# Patient Record
Sex: FEMALE | Race: WHITE | NOT HISPANIC OR LATINO | Employment: OTHER | URBAN - METROPOLITAN AREA
[De-identification: names, ages, dates, MRNs, and addresses within clinical notes are randomized per-mention and may not be internally consistent; named-entity substitution may affect disease eponyms.]

---

## 2018-01-29 ENCOUNTER — CONVERSION ENCOUNTER (OUTPATIENT)
Dept: FAMILY MEDICINE CLINIC | Facility: CLINIC | Age: 58
End: 2018-01-29

## 2018-01-29 ENCOUNTER — OFFICE VISIT CONVERTED (OUTPATIENT)
Dept: FAMILY MEDICINE CLINIC | Facility: CLINIC | Age: 58
End: 2018-01-29
Attending: NURSE PRACTITIONER

## 2018-05-18 ENCOUNTER — OFFICE VISIT CONVERTED (OUTPATIENT)
Dept: FAMILY MEDICINE CLINIC | Facility: CLINIC | Age: 58
End: 2018-05-18
Attending: NURSE PRACTITIONER

## 2018-11-28 ENCOUNTER — OFFICE VISIT CONVERTED (OUTPATIENT)
Dept: FAMILY MEDICINE CLINIC | Facility: CLINIC | Age: 58
End: 2018-11-28
Attending: NURSE PRACTITIONER

## 2018-11-28 ENCOUNTER — CONVERSION ENCOUNTER (OUTPATIENT)
Dept: FAMILY MEDICINE CLINIC | Facility: CLINIC | Age: 58
End: 2018-11-28

## 2019-01-18 ENCOUNTER — HOSPITAL ENCOUNTER (OUTPATIENT)
Dept: OTHER | Facility: HOSPITAL | Age: 59
Discharge: HOME | End: 2019-01-18
Attending: EMERGENCY MEDICINE

## 2019-01-20 LAB — HBV SURFACE AB SER-ACNC: 314.4 MIU/ML

## 2019-02-18 ENCOUNTER — HOSPITAL ENCOUNTER (OUTPATIENT)
Dept: OTHER | Facility: HOSPITAL | Age: 59
Discharge: HOME | End: 2019-02-18

## 2019-02-18 LAB
ANION GAP SERPL CALC-SCNC: 15 MMOL/L (ref 8–19)
BUN SERPL-MCNC: 13 MG/DL (ref 5–25)
BUN/CREAT SERPL: 12 {RATIO} (ref 6–20)
CALCIUM SERPL-MCNC: 9.7 MG/DL (ref 8.7–10.4)
CHLORIDE SERPL-SCNC: 101 MMOL/L (ref 99–111)
CONV CO2: 28 MMOL/L (ref 22–32)
CREAT UR-MCNC: 1.06 MG/DL (ref 0.5–0.9)
GFR SERPLBLD BASED ON 1.73 SQ M-ARVRAT: 58 ML/MIN/{1.73_M2}
GLUCOSE SERPL-MCNC: 91 MG/DL (ref 65–99)
OSMOLALITY SERPL CALC.SUM OF ELEC: 290 MOSM/KG (ref 273–304)
POTASSIUM SERPL-SCNC: 4.4 MMOL/L (ref 3.5–5.3)
SODIUM SERPL-SCNC: 140 MMOL/L (ref 135–147)
T4 FREE SERPL-MCNC: 1.7 NG/DL (ref 0.9–1.8)
TSH SERPL-ACNC: 2.49 M[IU]/L (ref 0.27–4.2)

## 2019-04-15 ENCOUNTER — OFFICE VISIT CONVERTED (OUTPATIENT)
Dept: FAMILY MEDICINE CLINIC | Facility: CLINIC | Age: 59
End: 2019-04-15
Attending: NURSE PRACTITIONER

## 2019-05-08 ENCOUNTER — HOSPITAL ENCOUNTER (OUTPATIENT)
Dept: GENERAL RADIOLOGY | Facility: HOSPITAL | Age: 59
Discharge: HOME | End: 2019-05-08
Attending: INTERNAL MEDICINE

## 2019-05-21 ENCOUNTER — HOSPITAL ENCOUNTER (OUTPATIENT)
Dept: OTHER | Facility: HOSPITAL | Age: 59
Discharge: HOME | End: 2019-05-21

## 2019-05-21 LAB
BASOPHILS # BLD AUTO: 0.12 10*3/UL (ref 0–0.2)
BASOPHILS NFR BLD AUTO: 1.8 % (ref 0–3)
CONV ABS IMM GRAN: 0.02 10*3/UL (ref 0–0.2)
CONV IMMATURE GRAN: 0.3 % (ref 0–1.8)
DEPRECATED RDW RBC AUTO: 45.1 FL (ref 36.4–46.3)
EOSINOPHIL # BLD AUTO: 0.13 10*3/UL (ref 0–0.7)
EOSINOPHIL # BLD AUTO: 1.9 % (ref 0–7)
ERYTHROCYTE [DISTWIDTH] IN BLOOD BY AUTOMATED COUNT: 13 % (ref 11.7–14.4)
HBA1C MFR BLD: 14.8 G/DL (ref 12–16)
HCT VFR BLD AUTO: 47.5 % (ref 37–47)
LYMPHOCYTES # BLD AUTO: 1.76 10*3/UL (ref 1–5)
MCH RBC QN AUTO: 29.5 PG (ref 27–31)
MCHC RBC AUTO-ENTMCNC: 31.2 G/DL (ref 33–37)
MCV RBC AUTO: 94.8 FL (ref 81–99)
MONOCYTES # BLD AUTO: 0.56 10*3/UL (ref 0.2–1.2)
MONOCYTES NFR BLD AUTO: 8.3 % (ref 3–10)
NEUTROPHILS # BLD AUTO: 4.12 10*3/UL (ref 2–8)
NEUTROPHILS NFR BLD AUTO: 61.5 % (ref 30–85)
NRBC CBCN: 0 % (ref 0–0.7)
PLATELET # BLD AUTO: 191 10*3/UL (ref 130–400)
PMV BLD AUTO: 11.1 FL (ref 9.4–12.3)
RBC # BLD AUTO: 5.01 10*6/UL (ref 4.2–5.4)
VARIANT LYMPHS NFR BLD MANUAL: 26.2 % (ref 20–45)
WBC # BLD AUTO: 6.71 10*3/UL (ref 4.8–10.8)

## 2019-05-22 LAB
ALBUMIN SERPL-MCNC: 4 G/DL (ref 3.5–5)
ALBUMIN/GLOB SERPL: 1.5 {RATIO} (ref 1.4–2.6)
ALP SERPL-CCNC: 90 U/L (ref 53–141)
ALT SERPL-CCNC: 8 U/L (ref 10–40)
ANION GAP SERPL CALC-SCNC: 14 MMOL/L (ref 8–19)
AST SERPL-CCNC: 12 U/L (ref 15–50)
BILIRUB SERPL-MCNC: 0.36 MG/DL (ref 0.2–1.3)
BUN SERPL-MCNC: 15 MG/DL (ref 5–25)
BUN/CREAT SERPL: 15 {RATIO} (ref 6–20)
CALCIUM SERPL-MCNC: 9.6 MG/DL (ref 8.7–10.4)
CHLORIDE SERPL-SCNC: 104 MMOL/L (ref 99–111)
CONV CO2: 29 MMOL/L (ref 22–32)
CONV TOTAL PROTEIN: 6.7 G/DL (ref 6.3–8.2)
CREAT UR-MCNC: 1.03 MG/DL (ref 0.5–0.9)
GFR SERPLBLD BASED ON 1.73 SQ M-ARVRAT: >60 ML/MIN/{1.73_M2}
GLOBULIN UR ELPH-MCNC: 2.7 G/DL (ref 2–3.5)
GLUCOSE SERPL-MCNC: 89 MG/DL (ref 65–99)
OSMOLALITY SERPL CALC.SUM OF ELEC: 296 MOSM/KG (ref 273–304)
POTASSIUM SERPL-SCNC: 4.1 MMOL/L (ref 3.5–5.3)
SODIUM SERPL-SCNC: 143 MMOL/L (ref 135–147)
T4 FREE SERPL-MCNC: 1.6 NG/DL (ref 0.9–1.8)
TSH SERPL-ACNC: 2.12 M[IU]/L (ref 0.27–4.2)

## 2019-09-09 ENCOUNTER — CONVERSION ENCOUNTER (OUTPATIENT)
Dept: FAMILY MEDICINE CLINIC | Facility: CLINIC | Age: 59
End: 2019-09-09

## 2019-09-09 ENCOUNTER — OFFICE VISIT CONVERTED (OUTPATIENT)
Dept: FAMILY MEDICINE CLINIC | Facility: CLINIC | Age: 59
End: 2019-09-09
Attending: NURSE PRACTITIONER

## 2019-09-24 ENCOUNTER — HOSPITAL ENCOUNTER (OUTPATIENT)
Dept: FAMILY MEDICINE CLINIC | Facility: CLINIC | Age: 59
Discharge: HOME | End: 2019-09-24
Attending: NURSE PRACTITIONER

## 2019-09-24 ENCOUNTER — OFFICE VISIT CONVERTED (OUTPATIENT)
Dept: FAMILY MEDICINE CLINIC | Facility: CLINIC | Age: 59
End: 2019-09-24
Attending: NURSE PRACTITIONER

## 2019-09-24 LAB
ALBUMIN SERPL-MCNC: 4.4 G/DL (ref 3.5–5)
ALBUMIN/GLOB SERPL: 1.5 {RATIO} (ref 1.4–2.6)
ALP SERPL-CCNC: 85 U/L (ref 53–141)
ALT SERPL-CCNC: 10 U/L (ref 10–40)
ANION GAP SERPL CALC-SCNC: 21 MMOL/L (ref 8–19)
AST SERPL-CCNC: 15 U/L (ref 15–50)
BASOPHILS # BLD AUTO: 0.07 10*3/UL (ref 0–0.2)
BASOPHILS NFR BLD AUTO: 1.1 % (ref 0–3)
BILIRUB SERPL-MCNC: 0.3 MG/DL (ref 0.2–1.3)
BUN SERPL-MCNC: 12 MG/DL (ref 5–25)
BUN/CREAT SERPL: 11 {RATIO} (ref 6–20)
CALCIUM SERPL-MCNC: 10 MG/DL (ref 8.7–10.4)
CHLORIDE SERPL-SCNC: 103 MMOL/L (ref 99–111)
CHOLEST SERPL-MCNC: 155 MG/DL (ref 107–200)
CHOLEST/HDLC SERPL: 2.5 {RATIO} (ref 3–6)
CONV ABS IMM GRAN: 0.03 10*3/UL (ref 0–0.2)
CONV CO2: 25 MMOL/L (ref 22–32)
CONV IMMATURE GRAN: 0.5 % (ref 0–1.8)
CONV TOTAL PROTEIN: 7.4 G/DL (ref 6.3–8.2)
CREAT UR-MCNC: 1.13 MG/DL (ref 0.5–0.9)
DEPRECATED RDW RBC AUTO: 44.8 FL (ref 36.4–46.3)
EOSINOPHIL # BLD AUTO: 0.12 10*3/UL (ref 0–0.7)
EOSINOPHIL # BLD AUTO: 2 % (ref 0–7)
ERYTHROCYTE [DISTWIDTH] IN BLOOD BY AUTOMATED COUNT: 13 % (ref 11.7–14.4)
GFR SERPLBLD BASED ON 1.73 SQ M-ARVRAT: 54 ML/MIN/{1.73_M2}
GLOBULIN UR ELPH-MCNC: 3 G/DL (ref 2–3.5)
GLUCOSE SERPL-MCNC: 78 MG/DL (ref 65–99)
HCT VFR BLD AUTO: 46.8 % (ref 37–47)
HDLC SERPL-MCNC: 62 MG/DL (ref 40–60)
HGB BLD-MCNC: 14.6 G/DL (ref 12–16)
LDLC SERPL CALC-MCNC: 62 MG/DL (ref 70–100)
LYMPHOCYTES # BLD AUTO: 1.57 10*3/UL (ref 1–5)
LYMPHOCYTES NFR BLD AUTO: 25.6 % (ref 20–45)
MCH RBC QN AUTO: 29.4 PG (ref 27–31)
MCHC RBC AUTO-ENTMCNC: 31.2 G/DL (ref 33–37)
MCV RBC AUTO: 94.4 FL (ref 81–99)
MONOCYTES # BLD AUTO: 0.48 10*3/UL (ref 0.2–1.2)
MONOCYTES NFR BLD AUTO: 7.8 % (ref 3–10)
NEUTROPHILS # BLD AUTO: 3.86 10*3/UL (ref 2–8)
NEUTROPHILS NFR BLD AUTO: 63 % (ref 30–85)
NRBC CBCN: 0 % (ref 0–0.7)
OSMOLALITY SERPL CALC.SUM OF ELEC: 299 MOSM/KG (ref 273–304)
PLATELET # BLD AUTO: 192 10*3/UL (ref 130–400)
PMV BLD AUTO: 11.7 FL (ref 9.4–12.3)
POTASSIUM SERPL-SCNC: 4.2 MMOL/L (ref 3.5–5.3)
RBC # BLD AUTO: 4.96 10*6/UL (ref 4.2–5.4)
SODIUM SERPL-SCNC: 145 MMOL/L (ref 135–147)
TRIGL SERPL-MCNC: 153 MG/DL (ref 40–150)
TSH SERPL-ACNC: 1.68 M[IU]/L (ref 0.27–4.2)
VLDLC SERPL-MCNC: 31 MG/DL (ref 5–37)
WBC # BLD AUTO: 6.13 10*3/UL (ref 4.8–10.8)

## 2019-09-26 LAB
AMOXICILLIN+CLAV SUSC ISLT: <=2
AMPICILLIN SUSC ISLT: >=32
AMPICILLIN+SULBAC SUSC ISLT: 8
BACTERIA UR CULT: ABNORMAL
CEFAZOLIN SUSC ISLT: <=4
CEFEPIME SUSC ISLT: <=1
CEFTAZIDIME SUSC ISLT: <=1
CEFTRIAXONE SUSC ISLT: <=1
CEFUROXIME ORAL SUSC ISLT: <=1
CEFUROXIME PARENTER SUSC ISLT: <=1
CIPROFLOXACIN SUSC ISLT: <=0.25
CONV LAST MENSTURAL PERIOD: NORMAL
ERTAPENEM SUSC ISLT: <=0.5
GENTAMICIN SUSC ISLT: <=1
LEVOFLOXACIN SUSC ISLT: <=0.12
NITROFURANTOIN SUSC ISLT: 64
SPECIMEN SOURCE: NORMAL
SPECIMEN SOURCE: NORMAL
TETRACYCLINE SUSC ISLT: <=1
THIN PREP CVX: NORMAL
TMP SMX SUSC ISLT: <=20
TOBRAMYCIN SUSC ISLT: <=1

## 2019-10-28 ENCOUNTER — HOSPITAL ENCOUNTER (OUTPATIENT)
Dept: OTHER | Facility: HOSPITAL | Age: 59
Discharge: HOME | End: 2019-10-28

## 2019-10-28 LAB
T4 FREE SERPL-MCNC: 1.4 NG/DL (ref 0.9–1.8)
TSH SERPL-ACNC: 3.28 M[IU]/L (ref 0.27–4.2)

## 2019-10-29 LAB — CONV ANTI MICROSOMAL AB: 186 IU/ML (ref 0–34)

## 2020-04-07 ENCOUNTER — TELEPHONE CONVERTED (OUTPATIENT)
Dept: FAMILY MEDICINE CLINIC | Facility: CLINIC | Age: 60
End: 2020-04-07
Attending: NURSE PRACTITIONER

## 2020-06-05 ENCOUNTER — HOSPITAL ENCOUNTER (OUTPATIENT)
Dept: GENERAL RADIOLOGY | Facility: HOSPITAL | Age: 60
Discharge: HOME | End: 2020-06-05
Attending: NURSE PRACTITIONER

## 2020-06-19 ENCOUNTER — HOSPITAL ENCOUNTER (OUTPATIENT)
Dept: GENERAL RADIOLOGY | Facility: HOSPITAL | Age: 60
Discharge: HOME | End: 2020-06-19
Attending: NURSE PRACTITIONER

## 2020-06-19 ENCOUNTER — OFFICE VISIT CONVERTED (OUTPATIENT)
Dept: FAMILY MEDICINE CLINIC | Facility: CLINIC | Age: 60
End: 2020-06-19
Attending: NURSE PRACTITIONER

## 2020-06-30 ENCOUNTER — OFFICE VISIT CONVERTED (OUTPATIENT)
Dept: ORTHOPEDIC SURGERY | Facility: CLINIC | Age: 60
End: 2020-06-30
Attending: PHYSICIAN ASSISTANT

## 2020-07-01 ENCOUNTER — HOSPITAL ENCOUNTER (OUTPATIENT)
Dept: CARDIOLOGY | Facility: HOSPITAL | Age: 60
Discharge: HOME | End: 2020-07-01
Attending: NURSE PRACTITIONER

## 2020-07-21 ENCOUNTER — OFFICE VISIT CONVERTED (OUTPATIENT)
Dept: ORTHOPEDIC SURGERY | Facility: CLINIC | Age: 60
End: 2020-07-21
Attending: PHYSICIAN ASSISTANT

## 2020-11-12 ENCOUNTER — TELEPHONE CONVERTED (OUTPATIENT)
Dept: FAMILY MEDICINE CLINIC | Facility: CLINIC | Age: 60
End: 2020-11-12
Attending: NURSE PRACTITIONER

## 2020-11-13 ENCOUNTER — HOSPITAL ENCOUNTER (OUTPATIENT)
Dept: LAB | Facility: HOSPITAL | Age: 60
Discharge: HOME | End: 2020-11-13
Attending: NURSE PRACTITIONER

## 2020-11-13 LAB
ALBUMIN SERPL-MCNC: 4.1 G/DL (ref 3.5–5)
ALBUMIN/GLOB SERPL: 1.3 {RATIO} (ref 1.4–2.6)
ALP SERPL-CCNC: 106 U/L (ref 53–141)
ALT SERPL-CCNC: 8 U/L (ref 10–40)
ANION GAP SERPL CALC-SCNC: 13 MMOL/L (ref 8–19)
AST SERPL-CCNC: 13 U/L (ref 15–50)
BASOPHILS # BLD AUTO: 0.09 10*3/UL (ref 0–0.2)
BASOPHILS NFR BLD AUTO: 1.5 % (ref 0–3)
BILIRUB SERPL-MCNC: 0.4 MG/DL (ref 0.2–1.3)
BUN SERPL-MCNC: 13 MG/DL (ref 5–25)
BUN/CREAT SERPL: 12 {RATIO} (ref 6–20)
CALCIUM SERPL-MCNC: 9.9 MG/DL (ref 8.7–10.4)
CHLORIDE SERPL-SCNC: 104 MMOL/L (ref 99–111)
CHOLEST SERPL-MCNC: 164 MG/DL (ref 107–200)
CHOLEST/HDLC SERPL: 2.7 {RATIO} (ref 3–6)
CONV ABS IMM GRAN: 0.02 10*3/UL (ref 0–0.2)
CONV CO2: 29 MMOL/L (ref 22–32)
CONV IMMATURE GRAN: 0.3 % (ref 0–1.8)
CONV TOTAL PROTEIN: 7.2 G/DL (ref 6.3–8.2)
CREAT UR-MCNC: 1.12 MG/DL (ref 0.5–0.9)
DEPRECATED RDW RBC AUTO: 45.1 FL (ref 36.4–46.3)
EOSINOPHIL # BLD AUTO: 0.18 10*3/UL (ref 0–0.7)
EOSINOPHIL # BLD AUTO: 3.1 % (ref 0–7)
ERYTHROCYTE [DISTWIDTH] IN BLOOD BY AUTOMATED COUNT: 13.2 % (ref 11.7–14.4)
GFR SERPLBLD BASED ON 1.73 SQ M-ARVRAT: 54 ML/MIN/{1.73_M2}
GLOBULIN UR ELPH-MCNC: 3.1 G/DL (ref 2–3.5)
GLUCOSE SERPL-MCNC: 87 MG/DL (ref 65–99)
HCT VFR BLD AUTO: 47.4 % (ref 37–47)
HDLC SERPL-MCNC: 60 MG/DL (ref 40–60)
HGB BLD-MCNC: 14.8 G/DL (ref 12–16)
LDLC SERPL CALC-MCNC: 77 MG/DL (ref 70–100)
LYMPHOCYTES # BLD AUTO: 1.41 10*3/UL (ref 1–5)
LYMPHOCYTES NFR BLD AUTO: 24.2 % (ref 20–45)
MCH RBC QN AUTO: 28.7 PG (ref 27–31)
MCHC RBC AUTO-ENTMCNC: 31.2 G/DL (ref 33–37)
MCV RBC AUTO: 92 FL (ref 81–99)
MONOCYTES # BLD AUTO: 0.31 10*3/UL (ref 0.2–1.2)
MONOCYTES NFR BLD AUTO: 5.3 % (ref 3–10)
NEUTROPHILS # BLD AUTO: 3.82 10*3/UL (ref 2–8)
NEUTROPHILS NFR BLD AUTO: 65.6 % (ref 30–85)
NRBC CBCN: 0 % (ref 0–0.7)
OSMOLALITY SERPL CALC.SUM OF ELEC: 293 MOSM/KG (ref 273–304)
PLATELET # BLD AUTO: 199 10*3/UL (ref 130–400)
PMV BLD AUTO: 11.5 FL (ref 9.4–12.3)
POTASSIUM SERPL-SCNC: 4.1 MMOL/L (ref 3.5–5.3)
RBC # BLD AUTO: 5.15 10*6/UL (ref 4.2–5.4)
SODIUM SERPL-SCNC: 142 MMOL/L (ref 135–147)
TRIGL SERPL-MCNC: 137 MG/DL (ref 40–150)
TSH SERPL-ACNC: 3.3 M[IU]/L (ref 0.27–4.2)
VLDLC SERPL-MCNC: 27 MG/DL (ref 5–37)
WBC # BLD AUTO: 5.83 10*3/UL (ref 4.8–10.8)

## 2021-01-15 ENCOUNTER — TELEMEDICINE CONVERTED (OUTPATIENT)
Dept: FAMILY MEDICINE CLINIC | Facility: CLINIC | Age: 61
End: 2021-01-15
Attending: NURSE PRACTITIONER

## 2021-02-18 ENCOUNTER — TELEPHONE CONVERTED (OUTPATIENT)
Dept: FAMILY MEDICINE CLINIC | Facility: CLINIC | Age: 61
End: 2021-02-18
Attending: NURSE PRACTITIONER

## 2021-02-19 ENCOUNTER — HOSPITAL ENCOUNTER (OUTPATIENT)
Dept: FAMILY MEDICINE CLINIC | Facility: CLINIC | Age: 61
Discharge: HOME | End: 2021-02-19
Attending: NURSE PRACTITIONER

## 2021-02-21 LAB — SARS-COV-2 RNA SPEC QL NAA+PROBE: NOT DETECTED

## 2021-02-23 ENCOUNTER — HOSPITAL ENCOUNTER (OUTPATIENT)
Dept: LAB | Facility: HOSPITAL | Age: 61
Discharge: HOME | End: 2021-02-23
Attending: NURSE PRACTITIONER

## 2021-02-23 ENCOUNTER — TELEMEDICINE CONVERTED (OUTPATIENT)
Dept: FAMILY MEDICINE CLINIC | Facility: CLINIC | Age: 61
End: 2021-02-23
Attending: NURSE PRACTITIONER

## 2021-02-23 LAB
ALBUMIN SERPL-MCNC: 4.1 G/DL (ref 3.5–5)
ALBUMIN/GLOB SERPL: 1.5 {RATIO} (ref 1.4–2.6)
ALP SERPL-CCNC: 107 U/L (ref 53–141)
ALT SERPL-CCNC: 8 U/L (ref 10–40)
ANION GAP SERPL CALC-SCNC: 16 MMOL/L (ref 8–19)
AST SERPL-CCNC: 11 U/L (ref 15–50)
BASOPHILS # BLD AUTO: 0.06 10*3/UL (ref 0–0.2)
BASOPHILS NFR BLD AUTO: 1.1 % (ref 0–3)
BILIRUB SERPL-MCNC: 0.41 MG/DL (ref 0.2–1.3)
BUN SERPL-MCNC: 13 MG/DL (ref 5–25)
BUN/CREAT SERPL: 12 {RATIO} (ref 6–20)
CALCIUM SERPL-MCNC: 9.8 MG/DL (ref 8.7–10.4)
CHLORIDE SERPL-SCNC: 103 MMOL/L (ref 99–111)
CHOLEST SERPL-MCNC: 123 MG/DL (ref 107–200)
CHOLEST/HDLC SERPL: 1.9 {RATIO} (ref 3–6)
CONV ABS IMM GRAN: 0.02 10*3/UL (ref 0–0.2)
CONV CO2: 27 MMOL/L (ref 22–32)
CONV IMMATURE GRAN: 0.4 % (ref 0–1.8)
CONV TOTAL PROTEIN: 6.9 G/DL (ref 6.3–8.2)
CREAT UR-MCNC: 1.08 MG/DL (ref 0.5–0.9)
DEPRECATED RDW RBC AUTO: 41.5 FL (ref 36.4–46.3)
EOSINOPHIL # BLD AUTO: 0.14 10*3/UL (ref 0–0.7)
EOSINOPHIL # BLD AUTO: 2.6 % (ref 0–7)
ERYTHROCYTE [DISTWIDTH] IN BLOOD BY AUTOMATED COUNT: 12.6 % (ref 11.7–14.4)
GFR SERPLBLD BASED ON 1.73 SQ M-ARVRAT: 56 ML/MIN/{1.73_M2}
GLOBULIN UR ELPH-MCNC: 2.8 G/DL (ref 2–3.5)
GLUCOSE SERPL-MCNC: 86 MG/DL (ref 65–99)
HCT VFR BLD AUTO: 44.3 % (ref 37–47)
HDLC SERPL-MCNC: 64 MG/DL (ref 40–60)
HGB BLD-MCNC: 14.2 G/DL (ref 12–16)
LDLC SERPL CALC-MCNC: 39 MG/DL (ref 70–100)
LYMPHOCYTES # BLD AUTO: 1.52 10*3/UL (ref 1–5)
LYMPHOCYTES NFR BLD AUTO: 27.7 % (ref 20–45)
MCH RBC QN AUTO: 29.1 PG (ref 27–31)
MCHC RBC AUTO-ENTMCNC: 32.1 G/DL (ref 33–37)
MCV RBC AUTO: 90.8 FL (ref 81–99)
MONOCYTES # BLD AUTO: 0.3 10*3/UL (ref 0.2–1.2)
MONOCYTES NFR BLD AUTO: 5.5 % (ref 3–10)
NEUTROPHILS # BLD AUTO: 3.44 10*3/UL (ref 2–8)
NEUTROPHILS NFR BLD AUTO: 62.7 % (ref 30–85)
NRBC CBCN: 0 % (ref 0–0.7)
OSMOLALITY SERPL CALC.SUM OF ELEC: 293 MOSM/KG (ref 273–304)
PLATELET # BLD AUTO: 198 10*3/UL (ref 130–400)
PMV BLD AUTO: 11 FL (ref 9.4–12.3)
POTASSIUM SERPL-SCNC: 4.4 MMOL/L (ref 3.5–5.3)
RBC # BLD AUTO: 4.88 10*6/UL (ref 4.2–5.4)
SODIUM SERPL-SCNC: 142 MMOL/L (ref 135–147)
TRIGL SERPL-MCNC: 99 MG/DL (ref 40–150)
TSH SERPL-ACNC: 6.1 M[IU]/L (ref 0.27–4.2)
VLDLC SERPL-MCNC: 20 MG/DL (ref 5–37)
WBC # BLD AUTO: 5.48 10*3/UL (ref 4.8–10.8)

## 2021-04-09 ENCOUNTER — HOSPITAL ENCOUNTER (OUTPATIENT)
Dept: LAB | Facility: HOSPITAL | Age: 61
Discharge: HOME | End: 2021-04-09
Attending: NURSE PRACTITIONER

## 2021-04-09 LAB — TSH SERPL-ACNC: 2.45 M[IU]/L (ref 0.27–4.2)

## 2021-04-16 ENCOUNTER — OFFICE VISIT CONVERTED (OUTPATIENT)
Dept: CARDIOLOGY | Facility: CLINIC | Age: 61
End: 2021-04-16
Attending: INTERNAL MEDICINE

## 2021-05-10 NOTE — H&P
History and Physical      Patient Name: Italia Beard   Patient ID: [id number]   Sex: Female   YOB: 1960    Primary Care Provider: Jazmín PERALTA    Visit Date: June 30, 2020    Provider: Nay Ramirez PA-C   Location: [facility]   Location Address: [address] [place] [address]  [id number]   Location Phone: [phone]          Chief Complaint  · LEFT ANKLE PAIN      History Of Present Illness  Italia Beard is a 59 year old /White female who presents today to [facility].      Patient is here for left ankle injury sustained 5/27/20 after a falling twisting the ankle. Patient states pain on the lateral side. Patient states using a walking boot. Patient had x rays read as negative fracture or dislocation.       Past Medical History  Depression; GERD; Hypothyroidism; Migraine; Nicotine dependence         Past Surgical History  Appendectomy; Carpal Tunnel Release; Tubal ligation         Medication List  albuterol sulfate 2.5 mg /3 mL (0.083 %) inhalation solution for nebulization; boot walker; Breo Ellipta 200-25 mcg/dose inhalation blister with device; cetirizine 10 mg oral tablet; Imitrex 100 mg oral tablet; levothyroxine 100 mcg oral tablet; Proventil HFA 90 mcg/actuation inhalation HFA aerosol inhaler         Allergy List  PENICILLINS         Family Medical History  Family history of breast cancer; -Father's Family History Unknown; -Mother's Family History Unknown         Social History  Alcohol (Current some day); Caffeine (Current every day); Second hand smoke exposure (Current some day); Tobacco (Current every day)         Review of Systems  · Constitutional  o Denies  o : fever, chills, weight loss  · Cardiovascular  o Denies  o : chest pain, shortness of breath  · Gastrointestinal  o Denies  o : liver disease, heartburn, nausea, blood in stools  · Genitourinary  o Denies  o : painful urination, blood in  "urine  · Integument  o Denies  o : rash, itching  · Neurologic  o Denies  o : headache, weakness, loss of consciousness  · Musculoskeletal  o Denies  o : painful, swollen joints  · Psychiatric  o Denies  o : drug/alcohol addiction, anxiety, depression      Vitals  Date Time BP Position Site L\R Cuff Size HR RR TEMP (F) WT  HT  BMI kg/m2 BSA m2 O2 Sat        06/30/2020 03:26 PM      73 - R   159lbs 3oz 5'  3\" 28.2 1.79 93 %          Physical Examination  · Constitutional  o Appearance  o : well developed, well-nourished, no obvious deformities present  · Head and Face  o Head  o :   § Inspection  § : normocephalic  o Face  o :   § Inspection  § : no facial lesions  · Eyes  o Conjunctivae  o : conjunctivae normal  o Sclerae  o : sclerae white  · Ears, Nose, Mouth and Throat  o Ears  o :   § External Ears  § : appearance within normal limits  § Hearing  § : intact  o Nose  o :   § External Nose  § : appearance normal  · Neck  o Inspection/Palpation  o : normal appearance  o Range of Motion  o : full range of motion  · Respiratory  o Respiratory Effort  o : breathing unlabored  o Inspection of Chest  o : normal appearance  o Auscultation of Lungs  o : no audible wheezing or rales  · Cardiovascular  o Heart  o : regular rate  · Gastrointestinal  o Abdominal Examination  o : soft and non-tender  · Skin and Subcutaneous Tissue  o General Inspection  o : intact, no rashes  · Psychiatric  o General  o : Alert and oriented x3  o Judgement and Insight  o : judgment and insight intact  o Mood and Affect  o : mood normal, affect appropriate  · Left Ankle-Street  o Inspection  o : swelling present, no atrophy, limping gait, ecchymosis , able to bear weight   o Palpation  o : CFL tender to palpation, No tenderness at base of the 5th Metatarsal , No tenderness of navicular, No tenderness at cuboid, No tenderness at tibiofibular synesmosis  o Range of Motion  o : full dorsiflexion, full plantarflexion, full inversion, full " eversion  o Strength  o : full dorsiflexion, full plantarflexion, full inversion, full eversion  o Neurovascular  o : normal sensation, dorsal pedal pulse 2+, posterior tibialis pulse 2+          Assessment  · Left ankle pain, unspecified chronicity     719.47/M25.572  · Ankle sprain     845.00/S93.409A  · Fibula fracture     823.81/S82.409A      Plan  · Orders  o Tobacco cessation counseling completed (4575F) - - 06/30/2020  · Medications  o Medications have been Reconciled  o Transition of Care or Provider Policy  · Instructions  o Reviewed the patient's Past Medical, Social, and Family history as well as the ROS at today's visit, no changes.  o Call or return if worsening symptoms.  o Exercise handout given.  o Follow Up in 3 weeks.  o Electronically Identified Patient Education Materials Provided Electronically     Prescribed physical therapy  may progress out of walking boot as pain permits             Electronically Signed by: Nay Ramirez PA-C -Author on June 30, 2020 04:40:56 PM

## 2021-05-12 NOTE — PROGRESS NOTES
Quick Note      Patient Name: Italia Beard   Patient ID: [id number]   Sex: Female   YOB: 1960    Primary Care Provider: Jazmín PERALTA    Visit Date: April 7, 2020    Provider: FABIAN Garcia   Location: [facility]   Location Address: [address] [place] [address] [place]  [id number]   Location Phone: [phone]          History Of Present Illness  TELEHEALTH VISIT  Chief Complaint: Cough, chest pain from coughing   Italia Beard is a 59 year old /White female who is presenting for evaluation via telehealth visit. Verbal consent obtained before beginning visit. Patient is alone on this call.   Provider spent 10 minutes with the patient during telehealth visit.   The following staff were present during this visit: FORREST Vargas   Past Medical History/Overview of Patient Symptoms  Italia Beard is a 59 year old /White female who presents for evaluation and treatment of:      phone call started at 1:09   ended at 1:19    call done due to covid-19.  she has been coughing, wheezing, sneezing, itchy eyes, allergy symptoms for a week. Now mucous is green. She has been afebrile. She is staying at home for the most part, hasn't taken anything otc except for her albuterol and nebs, but she is out of her nebs.           Assessment  · Allergic rhinitis due to allergen     477.9/J30.9  · Cough     786.2/R05  · Nicotine dependence     305.1/F17.200  · RAD (reactive airway disease)     493.90/J45.909    Problems Reconciled  Plan  · Orders  o ACO-39: Current medications updated and reviewed () - - 04/07/2020  o ACO-14: Influenza immunization was not administered for reasons documented () - - 04/07/2020  · Medications  o Medrol (Jesus) 4 mg oral tablets,dose pack   SIG: take by oral route as directed per package instructions   DISP: (1) 21 ct dose-pack with 0 refills  Prescribed on 04/07/2020     o azithromycin 250 mg oral tablet    SIG: take 2 tablets (500 mg) by oral route once daily for 1 day then 1 tablet (250 mg) by oral route once daily for 4 days   DISP: (6) tablets with 0 refills  Prescribed on 04/07/2020     o albuterol sulfate 2.5 mg /3 mL (0.083 %) inhalation solution for nebulization   SIG: inhale 3 milliliters (2.5 mg) by nebulization route 4 times per day as needed   DISP: (60) Vials with 0 refills  Prescribed on 04/07/2020     o cetirizine 10 mg oral tablet   SIG: take 1 tablet (10 mg) by oral route once daily for 30 days   DISP: (30) tablets with 2 refills  Prescribed on 04/07/2020     o Imitrex 100 mg oral tablet   SIG: take 1 tablet by oral route once   DISP: (10) tablet with 2 refills  Prescribed on 04/07/2020     o Proventil HFA 90 mcg/actuation inhalation HFA aerosol inhaler   SIG: inhale 2 puffs (180 mcg) by inhalation route every 4 hours as needed   DISP: (1) 6.7 gm canister with 1 refills  Refilled on 04/07/2020     o Bactrim -160 mg oral tablet   SIG: take 1 tablet by oral route every 12 hours for 10 days   DISP: (20) tablets with 0 refills  Discontinued on 04/07/2020     o Medications have been Reconciled  o Transition of Care or Provider Policy  · Instructions  o *Form of nicotine being used: cigarettes  o Patient was strongly encouraged to discontinue use of any nicotine containing product or minimize the use of the product.  o Plan Of Care: abx and steroids, allergy meds, call me friday if not improving  o Take all medications as prescribed/directed.  o Rest. Increase Fluids.  o Call the office with any concerns or questions.  · Disposition  o Call or Return if symptoms worsen or persist.            Electronically Signed by: FABIAN Garcia -Author on April 7, 2020 01:46:54 PM

## 2021-05-13 NOTE — PROGRESS NOTES
Progress Note      Patient Name: Italia Beard   Patient ID: [id number]   Sex: Female   YOB: 1960    Primary Care Provider: Jazmín PERALTA    Visit Date: November 12, 2020    Provider: FABIAN Garcia   Location: [facility]   Location Address: [address]  [place]  [id number]   Location Phone: [phone]          Chief Complaint     medication refill       History Of Present Illness  Italia Beard is a 60 year old /White female who presents for evaluation and treatment of:   TELEHEALTH TELEPHONE VISIT  Italia Beard is a 60 year old /White female who is presenting for evaluation via telehealth telephone visit. Verbal consent obtained before beginning visit.   Provider spent 11 minutes with patient during telehealth visit.   Past Medical History/Overview of Patient Symptoms     hypothyroidism, had been managed by endocrinology who has retired, so she would like me to now manage. She is doing well, energy level is good    chronic bronchitis in a smoker, she needs her inhalers refilled.     she will go for lab work       Past Medical History  Disease Name Date Onset Notes   Chronic Obstructive Pulmonary Disease --  --    Depression --  --    GERD --  --    Hypothyroidism --  --    Migraine --  --    Nicotine dependence --  --    Seasonal allergies --  --    Thyroid disorder --  --          Past Surgical History  Procedure Name Date Notes   Appendectomy --  --    Carpal Tunnel Release --  right hand   Colonoscopy --  --    Gallbladder --  --    Tubal ligation --  --          Medication List  Name Date Started Instructions   albuterol sulfate 2.5 mg /3 mL (0.083 %) inhalation solution for nebulization 04/07/2020 inhale 3 milliliters (2.5 mg) by nebulization route 4 times per day as needed   boot walker 06/22/2020 wear daily   Breo Ellipta 200-25 mcg/dose inhalation blister with device 01/29/2018  inhale 1 puff by inhalation route once daily at the same time each day   Imitrex 100 mg oral tablet 04/07/2020 take 1 tablet by oral route once   levothyroxine 100 mcg oral tablet 09/24/2019 take 1 tablet (100 mcg) by oral route once daily for 30 days   Proventil HFA 90 mcg/actuation inhalation HFA aerosol inhaler 09/11/2020 inhale 2 puffs (180 mcg) by inhalation route every 4 hours as needed         Allergy List  Allergen Name Date Reaction Notes   PENICILLINS --  --  --        Allergies Reconciled  Family Medical History  Disease Name Relative/Age Notes   Stroke Father/   Father   Heart Disease Father/  Mother/   Mother; Father   Diabetes, unspecified type Sister/   Sister   Family history of breast cancer  Aunt   -Father's Family History Unknown Father/   Father   -Mother's Family History Unknown Mother/   Mother         Social History  Finding Status Start/Stop Quantity Notes   Alcohol Current some day 0/0 --  drinks rarely   Caffeine Current every day 0/0 --  drinks regularly; 3-4 times per day   lives with children --  --/-- --  --    . --  --/-- --  --    Recreational Drug Use Never --/-- --  no   Second hand smoke exposure Current some day 0/0 --  yes   Tobacco Current every day --/-- 1.5 PPD current every day smoker, 1.5 packs per day, smoked 21-30 years  current everyday smoker; started smoking at age 17; smoked 30 cigarette(s) per day   Working --  --/-- --  --          Immunizations  NameDate Admin Mfg Trade Name Lot Number Route Inj VIS Given VIS Publication   Hepatitis A05/18/2018 SKB HAVRIX-ADULT [id number] IM RD 05/18/2018 07/20/2016   Comments: pt tolerated well ,left office in stable condition         Review of Systems  · Constitutional  o Denies  o : fever, fatigue, weight loss, weight gain  · Cardiovascular  o Denies  o : lower extremity edema, claudication, chest pressure, palpitations  · Respiratory  o Denies  o : shortness of breath, wheezing, cough, hemoptysis, dyspnea on  exertion  · Gastrointestinal  o Denies  o : nausea, vomiting, diarrhea, constipation, abdominal pain  · Psychiatric  o Denies  o : anxiety, depression          Assessment  · Hypothyroidism     244.9/E03.9  · Screening for depression     V79.0/Z13.89  · Medication monitoring encounter     V58.83/Z51.81    Problems Reconciled  Plan  · Orders  o Annual depression screening, 15 minutes (69010, ) - V79.0/Z13.89 - 11/12/2020  o ACO-18: Negative screen for clinical depression using a standardized tool () - V79.0/Z13.89 - 11/12/2020  o ACO-39: Current medications updated and reviewed (, 1159F) - - 11/12/2020  o ACO-20: Screening Mammography documented and reviewed [facility] () - - 11/12/2020  o Physician Telephone Evaluation, 11-20 minutes (42777) - - 11/12/2020  · Medications  o Breo Ellipta 200-25 mcg/dose inhalation blister with device   SIG: inhale 1 puff by inhalation route once daily at the same time each day   DISP: (1) Box with 5 refills  Refilled on 11/12/2020     o Proventil HFA 90 mcg/actuation inhalation HFA aerosol inhaler   SIG: inhale 2 puffs (180 mcg) by inhalation route every 4 hours as needed   DISP: (1) Carton with 1 refills  Refilled on 11/12/2020     o Medications have been Reconciled  o Transition of Care or Provider Policy  · Instructions  o Depression Screen completed and scanned into the EMR under the designated folder within the patient's documents.  o Today's PHQ-9 result is __4_  o The provider screening met the required time of 15 minutes.  o Take all medications as prescribed/directed.  o Patient was educated/instructed on their diagnosis, treatment and medications prior to discharge from the clinic today.  o Patient instructed to seek medical attention urgently for new or worsening symptoms.  o Call the office with any concerns or questions.  o Chronic conditions reviewed and taken into consideration for today's treatment plan.  o Plan Of Care:   o she will do labs tomorrow and  we will wait to refill meds to make sure tsh is normal, she has a week left of the med. Will call [facility] reslts. Refill the inhalers. Work on smoking cessation  · Disposition  o Call or Return if symptoms worsen or persist.  o F/u appt in 6 months            Electronically Signed by: FABIAN Gracia -Author on November 12, 2020 02:08:34 PM

## 2021-05-13 NOTE — PROGRESS NOTES
Progress Note      Patient Name: Italia Beard   Patient ID: [id number]   Sex: Female   YOB: 1960    Primary Care Provider: Jazmín PERALTA    Visit Date: June 19, 2020    Provider: FABIAN Garcia   Location: [facility]   Location Address: [address] [place] [address] [place]  [id number]   Location Phone: [phone]          Chief Complaint     Left ankle swelling, painful       History Of Present Illness  Italia Beard is a 59 year old /White female who presents for evaluation and treatment of:      about a month ago, she was sitting on her bed, got up, felt like her foot was asleep as she started walking she says she felt something snap and she fell and hit the floor. The pain was so bad it made her nauseated. She put it up, iced it, felt like it was broken but didnt' seek treatment. She called the office the next week and we ordered xrays which were normal. It has been painful and swelling ever since. She tries to keep it elevated but she has a 3 year old and she stays busy.       Past Medical History  Disease Name Date Onset Notes   Depression --  --    GERD --  --    Hypothyroidism --  --    Migraine --  --    Nicotine dependence --  --          Past Surgical History  Procedure Name Date Notes   Appendectomy --  --    Carpal Tunnel Release --  right hand   Tubal ligation --  --          Medication List  Name Date Started Instructions   albuterol sulfate 2.5 mg /3 mL (0.083 %) inhalation solution for nebulization 04/07/2020 inhale 3 milliliters (2.5 mg) by nebulization route 4 times per day as needed   Breo Ellipta 200-25 mcg/dose inhalation blister with device 01/29/2018 inhale 1 puff by inhalation route once daily at the same time each day   cetirizine 10 mg oral tablet 04/07/2020 take 1 tablet (10 mg) by oral route once daily for 30 days   Imitrex 100 mg oral tablet 04/07/2020 take 1 tablet by oral route once   levothyroxine 100 mcg  "oral tablet 09/24/2019 take 1 tablet (100 mcg) by oral route once daily for 30 days   Proventil HFA 90 mcg/actuation inhalation HFA aerosol inhaler 04/07/2020 inhale 2 puffs (180 mcg) by inhalation route every 4 hours as needed         Allergy List  Allergen Name Date Reaction Notes   PENICILLINS --  --  --        Allergies Reconciled  Family Medical History  Disease Name Relative/Age Notes   Family history of breast cancer  Aunt   -Father's Family History Unknown Father/   Father   -Mother's Family History Unknown Mother/   Mother         Social History  Finding Status Start/Stop Quantity Notes   Alcohol Current some day 0/0 --  drinks rarely   Caffeine Current every day 0/0 --  drinks regularly; 3-4 times per day   Second hand smoke exposure Current some day 0/0 --  yes   Tobacco Current every day 16/0 --  current everyday smoker; started smoking at age 17; smoked 30 cigarette(s) per day         Immunizations  NameDate Admin Mfg Trade Name Lot Number Route Inj VIS Given VIS Publication   Hepatitis A05/18/2018 SKB HAVRIX-ADULT [id number] IM RD 05/18/2018 07/20/2016   Comments: pt tolerated well ,left office in stable condition         Review of Systems  · Cardiovascular  o Admits  o : lower extremity edema  o Denies  o : chest pain, irregular heart beats  · Musculoskeletal  o Admits  o : joint swelling, muscle pain, ankle pain  o Denies  o : limitation of motion      Vitals  Date Time BP Position Site L\R Cuff Size HR RR TEMP (F) WT  HT  BMI kg/m2 BSA m2 O2 Sat HC       09/09/2019 01:09 /69 Sitting    84 - R 16 98.6 144lbs 3oz 5'  3\" 25.54 1.71 98 %    09/24/2019 07:49 /70 Sitting    77 - R 16 97.6 145lbs 4oz 5'  3\" 25.73 1.71 98 %    06/19/2020 02:26 /79 Sitting    96 - R 20 98.4 159lbs 1oz 5'  3\" 28.18 1.79 98 %          Physical Examination  · Constitutional  o Appearance  o : well developed, well-nourished, no acute distress  · Neck  o Inspection/Palpation  o : normal appearance, no masses or " tenderness, trachea midline  o Thyroid  o : gland size normal, nontender, no nodules or masses present on palpation  · Respiratory  o Respiratory Effort  o : breathing unlabored  o Inspection of Chest  o : chest rise symmetric bilaterally  o Auscultation of Lungs  o : clear to auscultation bilaterally throughout inspiration and expiration  · Cardiovascular  o Heart  o :   § Auscultation of Heart  § : regular rate and rhythm, no murmurs, gallops or rubs  o Peripheral Vascular System  o :   § Extremities  § : no edema  · Lymphatic  o Neck  o : no cervical lymphadenopathy, no supraclavicular lymphadenopathy  · Psychiatric  o Mood and Affect  o : mood normal, affect appropriate     the left lower leg to ankle is swollen and tender to touch. no redness. she is most tender at the left lateral proximal malleolus.  No bruising evident.           Assessment  · Nicotine dependence     305.1/F17.200  · Fall     E888.9/W19.XXXA  · Ankle pain     719.47/M25.579  · Ankle swelling     719.07/M25.473      Plan  · Orders  o ACO-39: Current medications updated and reviewed () - - 06/19/2020  o ACO-14: Influenza immunization was not administered for reasons documented () - - 06/19/2020  o Xray ankle left [facility] ([id number]) - E888.9/W19.XXXA, 719.47/M25.579 - 06/22/2020  o Xray tib/fib left [facility] ([id number]) - E888.9/W19.XXXA, 719.47/M25.579 - 06/22/2020  · Medications  o boot walker   SIG: wear daily   DISP: (1) appliance with 0 refills  Prescribed on 06/22/2020     o Medications have been Reconciled  o Transition of Care or Provider Policy  · Instructions  o *Form of nicotine being used: cigarettes  o Patient was strongly encouraged to discontinue use of any nicotine containing product or minimize the use of the product.  o Plan Of Care: repeat the xray, put in a boot walker. If negative, DVT u/s and if neg. refer to ortho.   o Take all medications as prescribed/directed.  o Call the office with any  concerns or questions.  · Disposition  o Call or Return if symptoms worsen or persist.            Electronically Signed by: FABIAN Garcia -Author on June 22, 2020 12:56:30 PM

## 2021-05-13 NOTE — PROGRESS NOTES
Progress Note      Patient Name: Italia Beard   Patient ID: [id number]   Sex: Female   YOB: 1960    Primary Care Provider: Jazmín PERALTA   Referring Provider: Ghassan Weir MD    Visit Date: July 21, 2020    Provider: Nay Ramirez PA-C   Location: [facility]   Location Address: [address] [place] [address]  [id number]   Location Phone: [phone]          Chief Complaint  · Left ankle pain      History Of Present Illness  Italia Beard is a 59 year old /White female who presents today to [facility].      Patient is following up for left ankle injury, left distal fibula fracture sustained 5/27/20 after a falling twisting the ankle. Patient states mild pain in the left ankle. Patient is wearing a tennis shoe.             Past Medical History  Chronic Obstructive Pulmonary Disease; Depression; GERD; Hypothyroidism; Migraine; Nicotine dependence; Seasonal allergies; Thyroid disorder         Past Surgical History  Appendectomy; Carpal Tunnel Release; Colonoscopy; Gallbladder; Tubal ligation         Medication List  albuterol sulfate 2.5 mg /3 mL (0.083 %) inhalation solution for nebulization; boot walker; Breo Ellipta 200-25 mcg/dose inhalation blister with device; Imitrex 100 mg oral tablet; levothyroxine 100 mcg oral tablet; Proventil HFA 90 mcg/actuation inhalation HFA aerosol inhaler         Allergy List  PENICILLINS         Family Medical History  Stroke; Heart Disease; Diabetes, unspecified type; Family history of breast cancer; -Father's Family History Unknown; -Mother's Family History Unknown         Social History  Alcohol (Current some day); Alcohol Use (Never); Caffeine (Current every day); lives with children; lives with spouse; .; Recreational Drug Use (Never); Second hand smoke exposure (Current some day); Tobacco (Current every day); Working         Review of Systems  · Constitutional  o Denies  o : fever, chills,  "weight loss  · Cardiovascular  o Denies  o : chest pain, shortness of breath  · Gastrointestinal  o Denies  o : liver disease, heartburn, nausea, blood in stools  · Genitourinary  o Denies  o : painful urination, blood in urine  · Integument  o Denies  o : rash, itching  · Neurologic  o Denies  o : headache, weakness, loss of consciousness  · Musculoskeletal  o Denies  o : painful, swollen joints  · Psychiatric  o Denies  o : drug/alcohol addiction, anxiety, depression      Vitals  Date Time BP Position Site L\R Cuff Size HR RR TEMP (F) WT  HT  BMI kg/m2 BSA m2 O2 Sat        07/21/2020 10:35 AM      72 - R   155lbs 16oz 5'  3\" 27.63 1.77 96 %          Physical Examination  · Constitutional  o Appearance  o : well developed, well-nourished, no obvious deformities present  · Head and Face  o Head  o :   § Inspection  § : normocephalic  o Face  o :   § Inspection  § : no facial lesions  · Eyes  o Conjunctivae  o : conjunctivae normal  o Sclerae  o : sclerae white  · Ears, Nose, Mouth and Throat  o Ears  o :   § External Ears  § : appearance within normal limits  § Hearing  § : intact  o Nose  o :   § External Nose  § : appearance normal  · Neck  o Inspection/Palpation  o : normal appearance  o Range of Motion  o : full range of motion  · Respiratory  o Respiratory Effort  o : breathing unlabored  o Inspection of Chest  o : normal appearance  o Auscultation of Lungs  o : no audible wheezing or rales  · Cardiovascular  o Heart  o : regular rate  · Gastrointestinal  o Abdominal Examination  o : soft and non-tender  · Skin and Subcutaneous Tissue  o General Inspection  o : intact, no rashes  · Psychiatric  o General  o : Alert and oriented x3  o Judgement and Insight  o : judgment and insight intact  o Mood and Affect  o : mood normal, affect appropriate  · Left Ankle-Street  o Inspection  o : swelling present, no atrophy, no limping gait, ecchymosis , able to bear weight   o Palpation  o : ligaments non tender to " palpation, No tenderness at base of the 5th Metatarsal , No tenderness of navicular, No tenderness at cuboid, No tenderness at tibiofibular synesmosis  o Range of Motion  o : full dorsiflexion, full plantarflexion, full inversion, full eversion  o Strength  o : full dorsiflexion, full plantarflexion, full inversion, full eversion  o Neurovascular  o : normal sensation, dorsal pedal pulse 2+, posterior tibialis pulse 2+  o Special Tests  o : normal heel raise, normal resisted eversion, normal resisted inversion, negative anterior drawer, negative squeeze test, negative talar tilt, negative proprioception, Negative Tinel's Test          Assessment  · Left ankle pain, unspecified chronicity     719.47/M25.572  · Fibula fracture     823.81/S82.409A  · Ankle sprain     845.00/S93.409A      Plan  · Medications  o Medications have been Reconciled  o Transition of Care or Provider Policy  · Instructions  o Reviewed the patient's Past Medical, Social, and Family history as well as the ROS at today's visit, no changes.  o Call or return if worsening symptoms.  o Exercise handout given.  o Follow Up PRN.  o Electronically Identified Patient Education Materials Provided Electronically            Electronically Signed by: AICHA Robles-HÉCTOR -Author on July 21, 2020 11:03:20 AM  Electronically Co-signed by: Ghassan Weir MD -Reviewer on July 23, 2020 04:08:37 PM

## 2021-05-14 VITALS
SYSTOLIC BLOOD PRESSURE: 118 MMHG | DIASTOLIC BLOOD PRESSURE: 60 MMHG | WEIGHT: 148 LBS | HEART RATE: 82 BPM | HEIGHT: 63 IN | BODY MASS INDEX: 26.22 KG/M2

## 2021-05-14 NOTE — PROGRESS NOTES
Progress Note      Patient Name: Italia Beard   Patient ID: [id number]   Sex: Female   YOB: 1960    Primary Care Provider: Jazmín PERALTA    Visit Date: January 15, 2021    Provider: FABIAN Garcia   Location: [facility]   Location Address: [address]  [place], [place]  [id number]   Location Phone: [phone]          Chief Complaint  · Follow up office visit within 14 calendar days of discharge from inpatient status (moderate complexity).      History Of Present Illness  FOLLOW UP OFFICE VISIT WITHIN 14 CALENDAR DAYS OF INPATIENT STATUS (MODERATE COMPLEXITY)  Italia Beadr presents to office for follow up post discharge from inpatient status within 14 calendar days. Patient was contacted within 2 business days via phone conversation. Documentation of that phone contact is present in the patient's electronic chart. Patient was admitted to inpatient facility on 01/05/2021 and discharged 01/07/2021 due to: chest pain, pacemaker placement.   Admitting MD: YASSINE Crowley   Her discharge summary has been reviewed and placed in the patient's electronic chart.   Patient's problem list is: COPD (chronic obstructive pulmonary disease), Depression, GERD, Hyperlipidemia, Hypothyroidism, Migraine, and Nicotine dependence   Patient's outpatient medication list has been reconcilled with the medication list from the discharge summary and has been reviewed with the patient. Current medication list is: Amberen 2 tablets oral, Aspir-81 81 mg oral tablet,delayed release (DR/EC), atorvastatin 40 mg oral tablet, Breo Ellipta 200-25 mcg/dose inhalation blister with device, cyanocobalamin (vitamin B-12) 1,000 mcg oral tablet, ferrous sulfate 325 mg (65 mg iron) oral tablet, Imitrex 100 mg oral tablet, levothyroxine 100 mcg oral tablet, nicotine 21 mg/24 hr transdermal patch 24 hour, and Proventil HFA 90 mcg/actuation inhalation HFA aerosol inhaler  "  Video Conferencing Visit  Italia Beard is a 60 year old /White female who is presenting for evaluation via video conferencing via [employer]. Verbal consent obtained before beginning visit.   The following staff were present during this visit:    Italia Beard is a 60 year old /White female who presents for evaluation and treatment of:      follow up after pacemaker placement for AV block. SHe went to the ER with chest pain, \" I just knew I was having a heart attack.\" She had a dual chamber placed and she is feeling good this morning except the site is sore. She has an apt with cardio today.    nicotine dep- she is down to 4 cig a day. Feels better, less coughing. SHe does have COPD.     HLP- newly on Lipitor    taking a baby asa       Past Medical History  Disease Name Date Onset Notes   Chronic Obstructive Pulmonary Disease --  --    Depression --  --    GERD --  --    Hypothyroidism --  --    Migraine --  --    Nicotine dependence --  --    Seasonal allergies --  --    Thyroid disorder --  --          Past Surgical History  Procedure Name Date Notes   Appendectomy --  --    Carpal Tunnel Release --  right hand   Colonoscopy --  --    Gallbladder --  --    Pacemaker --  --    Tubal ligation --  --          Medication List  Name Date Started Instructions   albuterol sulfate 2.5 mg /3 mL (0.083 %) inhalation solution for nebulization 04/07/2020 inhale 3 milliliters (2.5 mg) by nebulization route 4 times per day as needed   Amberen 2 tablets oral 01/07/2021 takes 2 tablets OTC every morning for menopause   Aspir-81 81 mg oral tablet,delayed release (DR/EC) 01/07/2021 take 1 tablet (81 mg) by oral route once daily for 30 days   atorvastatin 40 mg oral tablet 01/07/2021 take 1 tablet (40 mg) by oral route once daily at bedtime for 30 days   boot walker 06/22/2020 wear daily   Breo Ellipta 200-25 mcg/dose inhalation blister with device 11/12/2020 inhale 1 puff by inhalation route " once daily at the same time each day   cyanocobalamin (vitamin B-12) 1,000 mcg oral tablet 01/07/2021 take 1 tablet by oral route daily   ferrous sulfate 325 mg (65 mg iron) oral tablet 01/07/2021 take 1 tablet (325 mg) by oral route once daily   Imitrex 100 mg oral tablet 04/07/2020 take 1 tablet by oral route once   levothyroxine 100 mcg oral tablet 11/17/2020 take 1 tablet (100 mcg) by oral route once daily for 30 days   nicotine 21 mg/24 hr transdermal patch 24 hour 01/07/2021 apply 1 patch (21 mg) by transdermal route once daily and remove at bedtime for 7 days   Proventil HFA 90 mcg/actuation inhalation HFA aerosol inhaler 11/12/2020 inhale 2 puffs (180 mcg) by inhalation route every 4 hours as needed         Allergy List  Allergen Name Date Reaction Notes   PENICILLINS --  --  --        Allergies Reconciled  Family Medical History  Disease Name Relative/Age Notes   Stroke Father/   Father   Heart Disease Father/  Mother/   Mother; Father   Diabetes, unspecified type Sister/   Sister   Family history of breast cancer  Aunt   -Father's Family History Unknown Father/   Father   -Mother's Family History Unknown Mother/   Mother         Social History  Finding Status Start/Stop Quantity Notes   Alcohol Current some day 0/0 --  drinks rarely   Caffeine Current every day 0/0 --  drinks regularly; 3-4 times per day   lives with children --  --/-- --  --    . --  --/-- --  --    Recreational Drug Use Never --/-- --  no   Second hand smoke exposure Current some day 0/0 --  yes   Tobacco Current every day --/-- 4 cigs current every day smoker, 1.5 packs per day, smoked 21-30 years  current everyday smoker; started smoking at age 17; smoked 30 cigarette(s) per day   Working --  --/-- --  --          Immunizations  NameDate Admin Mfg Trade Name Lot Number Route Inj VIS Given VIS Publication   Hepatitis A05/18/2018 SKB HAVRIX-ADULT [id number] IM RD 05/18/2018 07/20/2016   Comments: pt tolerated well ,left office in  stable condition         Review of Systems  · Constitutional  o Denies  o : fever, weight gain, weight loss, fatigue  · Cardiovascular  o Denies  o : palpitation, chest Pain, claudication, lower extremity edema  · Respiratory  o Denies  o : shortness of breath, hemoptysis, wheezing, dry cough, productive cough  · Gastrointestinal  o Denies  o : nausea, vomiting, diarrhea, constipation, abdominal pain  · Neurologic  o Denies  o : unsteady gait, weakness, dizziness, H/A  · Psychiatric  o Denies  o : anxiety, depression      Physical Examination     well developed well nourished in no distress    affect is pleasant, well groomed    breathing comfortably without difficulty    no peripheral edema    incision site for pacemaker in the left upper chest wall looks like it's healing well, minimal redness and bruising but tender when she touches it.     neuro intact           Assessment  · COPD (chronic obstructive pulmonary disease)     496/J44.9  · Hyperlipidemia     272.4/E78.5  · Hypothyroidism     244.9/E03.9  · Nicotine dependence     305.1/F17.200  · Tobacco abuse counseling       Tobacco abuse counseling     V65.42/Z71.6  · History of first degree AV block     V12.59/Z86.79  · Pacemaker     V45.01/Z95.0      Plan  · Orders  o Smoking cessation counseling, 3-10 minutes [facility] (92059) - 305.1/F17.200 - 01/15/2021  o ACO-17: Screened for tobacco use AND received tobacco cessation intervention (4004F) - 305.1/F17.200 - 01/15/2021  o ACO-17: Screened for tobacco use AND received tobacco cessation intervention (4004F) - V65.42/Z71.6 - 01/15/2021  o Discharge medications reconciled with the current medication list (1111F) - - 01/15/2021  o Physical, Primary Care Panel (CBC, CMP, Lipid, TSH) [facility] ([id number], [id number], 12100, 01874) - 496/J44.9, 272.4/E78.5, V12.59/Z86.79 - 02/15/2021  o ACO-39: Current medications updated and reviewed (, 1159F) - - 01/15/2021  · Medications  o atorvastatin 40 mg oral tablet   SIG: take 1 tablet (40  mg) by oral route once daily at bedtime for 30 days   DISP: (30) Tablet with 5 refills  Prescribed on 01/15/2021     o nicotine 21 mg/24 hr transdermal patch 24 hour   SIG: apply 1 patch (21 mg) by transdermal route once daily and remove at bedtime for 7 days   DISP: (30) Patch with 0 refills  Prescribed on 01/15/2021     o Medications have been Reconciled  o Transition of Care or Provider Policy  · Instructions  o Advised that cheeses and other sources of dairy fats, animal fats, fast food, and the extras (candy, pastries, pies, doughnuts and cookies) all contain LDL raising nutrients. Advised to increase fruits, vegetables, whole grains, and to monitor portion sizes.   o *Form of nicotine being used:   o Patient was strongly encouraged to discontinue use of any nicotine containing product or minimize the use of the product.  o Tobacco and smoking cessation counseling for more than 3 minutes was completed.  o Patient discharge summary has been reviewed and placed in patient's electronic medical record.  o Patient received a phone call from my office within 2 business days of discharge from inpatient status, and documented within the patient's chart.  o Also patient was seen (face to face) for follow up evaluation within 14 calendar days of discharge from inpatient status for a severe complexity issue.  o Patient was educated on their diagnosis, treatment and any medication changes while being evaluated today.  o Patient is taking medications as prescribed and doing well.   o Patient was educated/instructed on their diagnosis, treatment and medications prior to discharge from the clinic today.  o Patient instructed to seek medical attention urgently for new or worsening symptoms.  o Call the office with any concerns or questions.  · Disposition  o Call or Return if symptoms worsen or persist.  o F/U appt in 1 month            Electronically Signed by: Jazmín Dolan APRN -Author on January 15, 2021 09:38:42  AM

## 2021-05-14 NOTE — PROGRESS NOTES
Progress Note      Patient Name: Italia Beard   Patient ID: [id number]   Sex: Female   YOB: 1960    Primary Care Provider: Jazmín PERALTA    Visit Date: February 23, 2021    Provider: FABIAN Garcia   Location: [facility]   Location Address: [address]  [place], [place]  [id number]   Location Phone: [phone]          Chief Complaint  · follow up URI      History Of Present Illness  Video Conferencing Visit  Italia Beard is a 60 year old /White female who is presenting for evaluation via video conferencing via [email or web address]. Verbal consent obtained before beginning visit.   The following staff were present during this visit:    Italia Beard is a 60 year old /White female who presents for evaluation and treatment of:      productive cough, wheezing, sinus congestion. Covid was negative. She has had symptoms for over a week. She is coughing a lot at night but not all through the night. She has cut back on her smoking to 1/2 ppd or less. She hasn't been taking anything otc. She has not lost taste or smell.       Past Medical History  Disease Name Date Onset Notes   COPD (chronic obstructive pulmonary disease) 01/15/2021 --    Depression --  --    GERD --  --    Hyperlipidemia 01/15/2021 --    Hypothyroidism --  --    Migraine --  --    Nicotine dependence 01/15/2021 --    Seasonal allergies --  --    Thyroid disorder --  --          Past Surgical History  Procedure Name Date Notes   Appendectomy --  --    Carpal Tunnel Release --  right hand   Colonoscopy --  --    Gallbladder --  --    Pacemaker --  --    Tubal ligation --  --          Medication List  Name Date Started Instructions   albuterol sulfate 2.5 mg /3 mL (0.083 %) inhalation solution for nebulization 04/07/2020 inhale 3 milliliters (2.5 mg) by nebulization route 4 times per day as needed   Amberen 2 tablets oral 01/07/2021 takes 2 tablets  OTC every morning for menopause   Aspir-81 81 mg oral tablet,delayed release (DR/EC) 01/07/2021 take 1 tablet (81 mg) by oral route once daily for 30 days   atorvastatin 40 mg oral tablet 01/15/2021 take 1 tablet (40 mg) by oral route once daily at bedtime for 30 days   boot walker 06/22/2020 wear daily   Breo Ellipta 200-25 mcg/dose inhalation blister with device 11/12/2020 inhale 1 puff by inhalation route once daily at the same time each day   cyanocobalamin (vitamin B-12) 1,000 mcg oral tablet 01/07/2021 take 1 tablet by oral route daily   ferrous sulfate 325 mg (65 mg iron) oral tablet 01/07/2021 take 1 tablet (325 mg) by oral route once daily   Imitrex 100 mg oral tablet 04/07/2020 take 1 tablet by oral route once   levothyroxine 100 mcg oral tablet 11/17/2020 take 1 tablet (100 mcg) by oral route once daily for 30 days   nicotine 21 mg/24 hr transdermal patch 24 hour 01/15/2021 apply 1 patch (21 mg) by transdermal route once daily and remove at bedtime for 7 days   Proventil HFA 90 mcg/actuation inhalation HFA aerosol inhaler 11/12/2020 inhale 2 puffs (180 mcg) by inhalation route every 4 hours as needed         Allergy List  Allergen Name Date Reaction Notes   PENICILLINS --  --  --          Family Medical History  Disease Name Relative/Age Notes   Stroke Father/   Father   Heart Disease Father/  Mother/   Mother; Father   Diabetes, unspecified type Sister/   Sister   Family history of breast cancer  Aunt   -Father's Family History Unknown Father/   Father   -Mother's Family History Unknown Mother/   Mother         Social History  Finding Status Start/Stop Quantity Notes   Alcohol Current some day 0/0 --  drinks rarely   Caffeine Current every day 0/0 --  drinks regularly; 3-4 times per day   lives with children --  --/-- --  --    . --  --/-- --  --    Recreational Drug Use Never --/-- --  no   Second hand smoke exposure Current some day 0/0 --  yes   Tobacco Current every day --/-- 4 cigs current  every day smoker, 1.5 packs per day, smoked 21-30 years  current everyday smoker; started smoking at age 17; smoked 30 cigarette(s) per day   Working --  --/-- --  --          Immunizations  NameDate Admin Mfg Trade Name Lot Number Route Inj VIS Given VIS Publication   Hepatitis A05/18/2018 SKB HAVRIX-ADULT [id number] IM RD 05/18/2018 07/20/2016   Comments: pt tolerated well ,left office in stable condition         Review of Systems  · Constitutional  o Denies  o : fever, fatigue, weight loss, weight gain  · HENT  o Admits  o : headaches, nasal congestion  o Denies  o : sinus pain  · Cardiovascular  o Denies  o : lower extremity edema, claudication, chest pressure, palpitations  · Respiratory  o Admits  o : shortness of breath, cough, wheezing  · Gastrointestinal  o Denies  o : nausea, vomiting, diarrhea, constipation, abdominal pain  · Psychiatric  o Denies  o : anxiety, depression      Physical Examination     well developed well nourished in no distress  talking without difficulty, resp unlabored  affect pleasant   gross neuro intact               Assessment  · Nicotine dependence     305.1/F17.200  · URI (upper respiratory infection)     465.9/J06.9  · RAD (reactive airway disease)     493.90/J45.909      Plan  · Orders  o ACO-17: Screened for tobacco use AND received tobacco cessation intervention (4004F) - 305.1/F17.200 - 02/23/2021  o ACO-39: Current medications updated and reviewed (1159F, ) - - 02/23/2021  · Medications  o azithromycin 250 mg oral tablet   SIG: take 2 tablets (500 mg) by oral route once daily for 1 day then 1 tablet (250 mg) by oral route once daily for 4 days   DISP: (6) Tablet with 0 refills  Prescribed on 02/23/2021     o Medrol (Jesus) 4 mg oral tablets,dose pack   SIG: take by oral route as directed per package instructions   DISP: (1) Packet with 0 refills  Prescribed on 02/23/2021     o Tessalon Perles 100 mg oral capsule   SIG: take 1 capsule (100 mg) by oral route 3 times per day as  needed for cough   DISP: (30) Capsule with 0 refills  Prescribed on 02/23/2021     o Medications have been Reconciled  o Transition of Care or Provider Policy  · Instructions  o *Form of nicotine being used: cigarettes  o Patient was strongly encouraged to discontinue use of any nicotine containing product or minimize the use of the product.  o Take all medications as prescribed/directed.  o Patient was educated/instructed on their diagnosis, treatment and medications prior to discharge from the clinic today.  o Patient instructed to seek medical attention urgently for new or worsening symptoms.  o Call the office with any concerns or questions.  o Minutes spent with patient including greater than 50% in Education/Counseling/Care Coordination.  o Time spent with the patient was minutes, more than 50% face to face.  o Chronic conditions reviewed and taken into consideration for today's treatment plan.  o Discussed Covid-19 precautions including, but not limited to, social distancing, avoid touching your face, and hand washing.   · Disposition  o Call or Return if symptoms worsen or persist.  o F/u appt in 3 months            Electronically Signed by: Jazmín Dolan APRN -Author on February 23, 2021 10:12:57 AM

## 2021-05-14 NOTE — PROGRESS NOTES
"   Progress Note      Patient Name: Italia Beard   Patient ID: [id number]   Sex: Female   YOB: 1960    Primary Care Provider: Jazmín PERALTA    Visit Date: February 18, 2021    Provider: FABIAN Garcia   Location: [facility]   Location Address: [address] [place], [place]  [address]   Location Phone: [phone]          Chief Complaint  · rhinorrhea  · sneezing  · HA  · low grade temp      History Of Present Illness  TELEHEALTH TELEPHONE VISIT  Italia Beard is a 60 year old /White female who is presenting for evaluation via telehealth telephone visit. Verbal consent obtained before beginning visit.   Provider spent 11 minutes with patient during telehealth visit.   The following staff were present during this visit: gm   Past Medical History/Overview of Patient Symptoms  Italia Beard is a 60 year old /White female who presents for evaluation and treatment of:      cough, sneezing, runny nose, headache and body aches that started yesterday.  She thinks she had a temp last night but right now it's 97. She hasn't taken anything otc because she has a new pacemaker and isn't sure what she can take.     HTN- BP this morning was \"good,\" 127/82. HR 78           Past Medical History  Disease Name Date Onset Notes   Chronic Obstructive Pulmonary Disease --  --    COPD (chronic obstructive pulmonary disease) 01/15/2021 --    Depression --  --    GERD --  --    Hyperlipidemia 01/15/2021 --    Hypothyroidism --  --    Migraine --  --    Nicotine dependence --  --    Nicotine dependence 01/15/2021 --    Seasonal allergies --  --    Thyroid disorder --  --          Past Surgical History  Procedure Name Date Notes   Appendectomy --  --    Carpal Tunnel Release --  right hand   Colonoscopy --  --    Gallbladder --  --    Pacemaker --  --    Tubal ligation --  --          Medication List  Name Date Started " Instructions   albuterol sulfate 2.5 mg /3 mL (0.083 %) inhalation solution for nebulization 04/07/2020 inhale 3 milliliters (2.5 mg) by nebulization route 4 times per day as needed   Amberen 2 tablets oral 01/07/2021 takes 2 tablets OTC every morning for menopause   Aspir-81 81 mg oral tablet,delayed release (DR/EC) 01/07/2021 take 1 tablet (81 mg) by oral route once daily for 30 days   atorvastatin 40 mg oral tablet 01/15/2021 take 1 tablet (40 mg) by oral route once daily at bedtime for 30 days   boot walker 06/22/2020 wear daily   Breo Ellipta 200-25 mcg/dose inhalation blister with device 11/12/2020 inhale 1 puff by inhalation route once daily at the same time each day   cyanocobalamin (vitamin B-12) 1,000 mcg oral tablet 01/07/2021 take 1 tablet by oral route daily   ferrous sulfate 325 mg (65 mg iron) oral tablet 01/07/2021 take 1 tablet (325 mg) by oral route once daily   Imitrex 100 mg oral tablet 04/07/2020 take 1 tablet by oral route once   levothyroxine 100 mcg oral tablet 11/17/2020 take 1 tablet (100 mcg) by oral route once daily for 30 days   nicotine 21 mg/24 hr transdermal patch 24 hour 01/15/2021 apply 1 patch (21 mg) by transdermal route once daily and remove at bedtime for 7 days   Proventil HFA 90 mcg/actuation inhalation HFA aerosol inhaler 11/12/2020 inhale 2 puffs (180 mcg) by inhalation route every 4 hours as needed         Allergy List  Allergen Name Date Reaction Notes   PENICILLINS --  --  --          Family Medical History  Disease Name Relative/Age Notes   Stroke Father/   Father   Heart Disease Father/  Mother/   Mother; Father   Diabetes, unspecified type Sister/   Sister   Family history of breast cancer  Aunt   -Father's Family History Unknown Father/   Father   -Mother's Family History Unknown Mother/   Mother         Social History  Finding Status Start/Stop Quantity Notes   Alcohol Current some day 0/0 --  drinks rarely   Caffeine Current every day 0/0 --  drinks regularly; 3-4  times per day   lives with children --  --/-- --  --    . --  --/-- --  --    Recreational Drug Use Never --/-- --  no   Second hand smoke exposure Current some day 0/0 --  yes   Tobacco Current every day --/-- 4 cigs current every day smoker, 1.5 packs per day, smoked 21-30 years  current everyday smoker; started smoking at age 17; smoked 30 cigarette(s) per day   Working --  --/-- --  --          Immunizations  NameDate Admin Mfg Trade Name Lot Number Route Inj VIS Given VIS Publication   Hepatitis A05/18/2018 [employer] HAVRIX-ADULT [id number] 05/18/2018 07/20/2016   Comments: pt tolerated well ,left office in stable condition         Review of Systems  · Constitutional  o Admits  o : fever  o Denies  o : fatigue, weight loss, weight gain  · HENT  o Admits  o : headaches, nasal congestion, nasal discharge, postnasal drip  · Cardiovascular  o Denies  o : lower extremity edema, claudication, chest pressure, palpitations  · Respiratory  o Admits  o : cough  o Denies  o : shortness of breath, wheezing, hemoptysis, dyspnea on exertion  · Gastrointestinal  o Denies  o : nausea, vomiting, diarrhea, constipation, abdominal pain  · Integument  o Denies  o : rash  · Psychiatric  o Denies  o : anxiety, depression              Assessment  · Viral URI     465.9/J06.9  · CAD (coronary artery disease)     414.00/I25.10  · History of pacemaker       Presence of cardiac pacemaker     V12.50/Z95.0      Plan  · Orders  o ACO-39: Current medications updated and reviewed (1159F, ) - - 02/18/2021  o [facility] NCOV2 (send-out) (59022) - 465.9/J06.9 - 02/18/2021  · Medications  o Medications have been Reconciled  o Transition of Care or Provider Policy  · Instructions  o Chronic conditions reviewed and taken into consideration for today's treatment plan.  o Patient instructed to seek medical attention urgently for new or worsening symptoms.  o Call the office with any concerns or questions.  · Disposition  o Call or  Return if symptoms worsen or persist.            Electronically Signed by: FABIAN Garcia -Author on February 18, 2021 12:47:00 PM

## 2021-05-14 NOTE — PROGRESS NOTES
"   Progress Note      Patient Name: Italia Beard   Patient ID: [id number]   Sex: Female   YOB: 1960    Primary Care Provider: Jazmín PERALTA    Visit Date: April 16, 2021    Provider: Cain Artis MD   Location: [facility]   Location Address: [address] [place] [address], [address]   [place]  [id number]   Location Phone: [phone]          History Of Present Illness  Italia Beard is a 60 year old /White female who presents for followup evaluation and management high-grade AV block, permanent pacemaker, and COPD. Ms. Beard is doing relatively well. Since pacemaker implant in January, she has had somewhat less shortness of breath overall. Pacemaker is functioning normally. She has had no problems with the site healing.   PAST MEDICAL HISTORY: COPD; Depression; GERD; Hyperlipidemia; Hypothyroidism; Migraine; Nicotine dependence.   PSYCHOSOCIAL HISTORY: Current smoker of one pack per day. Denies alcohol use.   CURRENT MEDICATIONS: Medications reviewed and are as documented.      ALLERGIES:  Penicillin.       Review of Systems  · Cardiovascular  o Admits  o : palpitations (fast, fluttering, or skipping beats), shortness of breath while walking or lying flat, chest pain or angina pectoris   o Denies  o : swelling (feet, ankles, hands)  · Respiratory  o Denies  o : chronic or frequent cough      Vitals  Date Time BP Position Site L\R Cuff Size HR RR TEMP (F) WT  HT  BMI kg/m2 BSA m2 O2 Sat FR L/min FiO2 HC       04/16/2021 10:33 /60 Sitting    82 - R   147lbs 16oz 5'  3\" 26.22 1.73             Physical Examination  · Constitutional  o Appearance  o : Normal, White female, pleasant, in no acute distress.  · Respiratory  o Auscultation of Lungs  o : Clear to auscultation bilaterally. No crackles or rhonchi.  · Cardiovascular  o Heart  o : S1, S2 is normally heard. No S3. No murmur, rubs, or gallops. Pacemaker site is healed well. "   · Gastrointestinal  o Abdominal Examination  o : Soft, nontender, nondistended. No free fluid. Bowel sounds heard in all four quadrants.  · Extremities  o Extremities  o : Warm and well perfused. No pitting pedal edema. Distal pulses present.  · Device Interrogation  o Device Interrogation  o : Her pacemaker interrogation today shows normal atrial and ventricular lead system function. She is 20% atrial pacing, 40% ventricular pacing. No arrhythmias.          Assessment     1.  High-grade AV block status post dual-chamber pacemaker implant in January 2021.  The device is        functioning normally.  The wound has healed well.    2.  Hypothyroidism, stable.  3.  COPD, stable.       Plan     1.  Continue remote pacemaker monitoring.  2.  Annual office visit with interrogation will be scheduled; otherwise, the patient will be followed as needed if        symptoms occur.  She is agreeable with this plan.             Electronically Signed by: Helen Glass-, Other -Author on April 22, 2021 07:37:16 PM  Electronically Co-signed by: Cain Artis MD -Reviewer on April 23, 2021 08:40:08 AM

## 2021-05-15 VITALS — BODY MASS INDEX: 28.21 KG/M2 | OXYGEN SATURATION: 93 % | HEART RATE: 73 BPM | HEIGHT: 63 IN | WEIGHT: 159.19 LBS

## 2021-05-15 VITALS
BODY MASS INDEX: 28.18 KG/M2 | TEMPERATURE: 98.4 F | SYSTOLIC BLOOD PRESSURE: 136 MMHG | OXYGEN SATURATION: 98 % | HEIGHT: 63 IN | RESPIRATION RATE: 20 BRPM | DIASTOLIC BLOOD PRESSURE: 79 MMHG | HEART RATE: 96 BPM | WEIGHT: 159.06 LBS

## 2021-05-15 VITALS
DIASTOLIC BLOOD PRESSURE: 64 MMHG | TEMPERATURE: 98.3 F | RESPIRATION RATE: 18 BRPM | SYSTOLIC BLOOD PRESSURE: 134 MMHG | HEART RATE: 124 BPM | WEIGHT: 137 LBS | OXYGEN SATURATION: 97 % | BODY MASS INDEX: 24.27 KG/M2 | HEIGHT: 63 IN

## 2021-05-15 VITALS
BODY MASS INDEX: 25.55 KG/M2 | SYSTOLIC BLOOD PRESSURE: 125 MMHG | RESPIRATION RATE: 16 BRPM | HEART RATE: 84 BPM | HEIGHT: 63 IN | DIASTOLIC BLOOD PRESSURE: 69 MMHG | TEMPERATURE: 98.6 F | OXYGEN SATURATION: 98 % | WEIGHT: 144.19 LBS

## 2021-05-15 VITALS — HEART RATE: 72 BPM | BODY MASS INDEX: 27.64 KG/M2 | HEIGHT: 63 IN | OXYGEN SATURATION: 96 % | WEIGHT: 156 LBS

## 2021-05-15 VITALS
OXYGEN SATURATION: 98 % | DIASTOLIC BLOOD PRESSURE: 70 MMHG | BODY MASS INDEX: 25.73 KG/M2 | SYSTOLIC BLOOD PRESSURE: 123 MMHG | HEART RATE: 77 BPM | WEIGHT: 145.25 LBS | RESPIRATION RATE: 16 BRPM | TEMPERATURE: 97.6 F | HEIGHT: 63 IN

## 2021-05-16 VITALS
HEIGHT: 63 IN | TEMPERATURE: 98.3 F | WEIGHT: 147 LBS | OXYGEN SATURATION: 98 % | RESPIRATION RATE: 14 BRPM | SYSTOLIC BLOOD PRESSURE: 135 MMHG | DIASTOLIC BLOOD PRESSURE: 77 MMHG | BODY MASS INDEX: 26.05 KG/M2 | HEART RATE: 78 BPM

## 2021-05-16 VITALS
SYSTOLIC BLOOD PRESSURE: 116 MMHG | BODY MASS INDEX: 26.78 KG/M2 | HEART RATE: 82 BPM | OXYGEN SATURATION: 97 % | DIASTOLIC BLOOD PRESSURE: 64 MMHG | TEMPERATURE: 97.7 F | RESPIRATION RATE: 14 BRPM | WEIGHT: 151.12 LBS | HEIGHT: 63 IN

## 2021-05-16 VITALS
OXYGEN SATURATION: 98 % | WEIGHT: 155.25 LBS | HEART RATE: 82 BPM | BODY MASS INDEX: 27.51 KG/M2 | HEIGHT: 63 IN | RESPIRATION RATE: 16 BRPM | DIASTOLIC BLOOD PRESSURE: 70 MMHG | SYSTOLIC BLOOD PRESSURE: 150 MMHG | TEMPERATURE: 97.9 F

## 2021-05-16 VITALS — SYSTOLIC BLOOD PRESSURE: 160 MMHG | DIASTOLIC BLOOD PRESSURE: 90 MMHG

## 2021-07-12 RX ORDER — LEVOTHYROXINE SODIUM 112 UG/1
112 TABLET ORAL DAILY
Qty: 90 TABLET | Refills: 0 | Status: SHIPPED | OUTPATIENT
Start: 2021-07-12 | End: 2021-10-14 | Stop reason: SDUPTHER

## 2021-07-12 RX ORDER — LEVOTHYROXINE SODIUM 112 UG/1
TABLET ORAL
COMMUNITY
Start: 2021-04-19 | End: 2021-07-12 | Stop reason: SDUPTHER

## 2021-07-12 NOTE — TELEPHONE ENCOUNTER
Caller: Italia Beard    Relationship: Self    Best call back number: [phone]     Medication needed:   Requested Prescriptions      No prescriptions requested or ordered in this encounter     LEVOTHYROXINE 112 MCG  TAKE 1 TABLET DAILY  90 DAY SUPPLY    When do you need the refill by: ASAP      Does the patient have less than a 3 day supply:  [] Yes  [x] No    What is the patient's preferred pharmacy: [address] [phone] [facility] [phone]

## 2021-08-05 RX ORDER — LEVOTHYROXINE SODIUM 112 UG/1
112 TABLET ORAL DAILY
Qty: 90 TABLET | Refills: 0 | Status: CANCELLED | OUTPATIENT
Start: 2021-08-05 | End: 2021-09-04

## 2021-08-05 NOTE — TELEPHONE ENCOUNTER
Caller: KishaItalia canales    Relationship: Self    Best call back number: [phone]    Medication needed:   Requested Prescriptions     Pending Prescriptions Disp Refills   • levothyroxine (SYNTHROID, LEVOTHROID) 112 MCG tablet 90 tablet 0     Sig: Take 1 tablet by mouth Daily for 30 days.       When do you need the refill by: ASAP        Does the patient have less than a 3 day supply:  [] Yes  [x] No    What is the patient's preferred pharmacy: [address] [phone] [facility] [phone]

## 2021-08-06 RX ORDER — ATORVASTATIN CALCIUM 40 MG/1
40 TABLET, FILM COATED ORAL
COMMUNITY
Start: 2021-06-27 | End: 2021-08-06 | Stop reason: SDUPTHER

## 2021-08-06 RX ORDER — ATORVASTATIN CALCIUM 40 MG/1
40 TABLET, FILM COATED ORAL NIGHTLY
Qty: 90 TABLET | Refills: 0 | Status: SHIPPED | OUTPATIENT
Start: 2021-08-06 | End: 2021-10-29 | Stop reason: SDUPTHER

## 2021-08-06 NOTE — TELEPHONE ENCOUNTER
90 day supply of thyroid med was  sent in to pharm 7/12-contacted pt-she states she needs the Atorvastatin 40mg-90 day supply please. Please verify. Last seen 2/2021

## 2021-10-14 ENCOUNTER — TELEPHONE (OUTPATIENT)
Dept: FAMILY MEDICINE CLINIC | Facility: CLINIC | Age: 61
End: 2021-10-14

## 2021-10-14 RX ORDER — LEVOTHYROXINE SODIUM 112 UG/1
112 TABLET ORAL DAILY
Qty: 30 TABLET | Refills: 0 | Status: SHIPPED | OUTPATIENT
Start: 2021-10-14 | End: 2021-10-29 | Stop reason: SDUPTHER

## 2021-10-29 ENCOUNTER — OFFICE VISIT (OUTPATIENT)
Dept: FAMILY MEDICINE CLINIC | Facility: CLINIC | Age: 61
End: 2021-10-29

## 2021-10-29 VITALS
RESPIRATION RATE: 16 BRPM | BODY MASS INDEX: 26.98 KG/M2 | WEIGHT: 152.3 LBS | OXYGEN SATURATION: 98 % | TEMPERATURE: 97.5 F | HEIGHT: 63 IN | SYSTOLIC BLOOD PRESSURE: 137 MMHG | HEART RATE: 93 BPM | DIASTOLIC BLOOD PRESSURE: 81 MMHG

## 2021-10-29 DIAGNOSIS — Z53.20 COLONOSCOPY REFUSED: ICD-10-CM

## 2021-10-29 DIAGNOSIS — Z86.79 HISTORY OF COMPLETE AV BLOCK: ICD-10-CM

## 2021-10-29 DIAGNOSIS — E78.2 MIXED HYPERLIPIDEMIA: Primary | ICD-10-CM

## 2021-10-29 DIAGNOSIS — Z95.0 PACEMAKER: ICD-10-CM

## 2021-10-29 DIAGNOSIS — Z28.21 REFUSED INFLUENZA VACCINE: ICD-10-CM

## 2021-10-29 DIAGNOSIS — Z53.20 MAMMOGRAM DECLINED: ICD-10-CM

## 2021-10-29 DIAGNOSIS — J41.0 SIMPLE CHRONIC BRONCHITIS (HCC): ICD-10-CM

## 2021-10-29 DIAGNOSIS — E03.9 ACQUIRED HYPOTHYROIDISM: ICD-10-CM

## 2021-10-29 PROBLEM — J44.9 COPD (CHRONIC OBSTRUCTIVE PULMONARY DISEASE): Status: ACTIVE | Noted: 2021-01-15

## 2021-10-29 PROBLEM — F17.200 NICOTINE DEPENDENCE: Status: ACTIVE | Noted: 2021-01-15

## 2021-10-29 PROBLEM — E78.5 HYPERLIPIDEMIA: Status: ACTIVE | Noted: 2021-01-15

## 2021-10-29 PROBLEM — F32.A DEPRESSION: Status: ACTIVE | Noted: 2021-10-29

## 2021-10-29 PROBLEM — J30.2 SEASONAL ALLERGIC RHINITIS: Status: ACTIVE | Noted: 2021-10-29

## 2021-10-29 LAB
ALBUMIN SERPL-MCNC: 4.4 G/DL (ref 3.5–5.2)
ALBUMIN/GLOB SERPL: 1.3 G/DL
ALP SERPL-CCNC: 112 U/L (ref 39–117)
ALT SERPL W P-5'-P-CCNC: 8 U/L (ref 1–33)
ANION GAP SERPL CALCULATED.3IONS-SCNC: 9.7 MMOL/L (ref 5–15)
AST SERPL-CCNC: 11 U/L (ref 1–32)
BASOPHILS # BLD AUTO: 0.08 10*3/MM3 (ref 0–0.2)
BASOPHILS NFR BLD AUTO: 0.9 % (ref 0–1.5)
BILIRUB SERPL-MCNC: 0.4 MG/DL (ref 0–1.2)
BUN SERPL-MCNC: 14 MG/DL (ref 8–23)
BUN/CREAT SERPL: 10.9 (ref 7–25)
CALCIUM SPEC-SCNC: 9.8 MG/DL (ref 8.6–10.5)
CHLORIDE SERPL-SCNC: 101 MMOL/L (ref 98–107)
CHOLEST SERPL-MCNC: 123 MG/DL (ref 0–200)
CO2 SERPL-SCNC: 29.3 MMOL/L (ref 22–29)
CREAT SERPL-MCNC: 1.28 MG/DL (ref 0.57–1)
DEPRECATED RDW RBC AUTO: 38.9 FL (ref 37–54)
EOSINOPHIL # BLD AUTO: 0.05 10*3/MM3 (ref 0–0.4)
EOSINOPHIL NFR BLD AUTO: 0.5 % (ref 0.3–6.2)
ERYTHROCYTE [DISTWIDTH] IN BLOOD BY AUTOMATED COUNT: 12.4 % (ref 12.3–15.4)
GFR SERPL CREATININE-BSD FRML MDRD: 43 ML/MIN/1.73
GLOBULIN UR ELPH-MCNC: 3.5 GM/DL
GLUCOSE SERPL-MCNC: 108 MG/DL (ref 65–99)
HCT VFR BLD AUTO: 42 % (ref 34–46.6)
HDLC SERPL-MCNC: 55 MG/DL (ref 40–60)
HGB BLD-MCNC: 14.6 G/DL (ref 12–15.9)
IMM GRANULOCYTES # BLD AUTO: 0.02 10*3/MM3 (ref 0–0.05)
IMM GRANULOCYTES NFR BLD AUTO: 0.2 % (ref 0–0.5)
LDLC SERPL CALC-MCNC: 46 MG/DL (ref 0–100)
LDLC/HDLC SERPL: 0.77 {RATIO}
LYMPHOCYTES # BLD AUTO: 1.73 10*3/MM3 (ref 0.7–3.1)
LYMPHOCYTES NFR BLD AUTO: 18.9 % (ref 19.6–45.3)
MCH RBC QN AUTO: 30.3 PG (ref 26.6–33)
MCHC RBC AUTO-ENTMCNC: 34.8 G/DL (ref 31.5–35.7)
MCV RBC AUTO: 87.1 FL (ref 79–97)
MONOCYTES # BLD AUTO: 0.87 10*3/MM3 (ref 0.1–0.9)
MONOCYTES NFR BLD AUTO: 9.5 % (ref 5–12)
NEUTROPHILS NFR BLD AUTO: 6.38 10*3/MM3 (ref 1.7–7)
NEUTROPHILS NFR BLD AUTO: 70 % (ref 42.7–76)
NRBC BLD AUTO-RTO: 0 /100 WBC (ref 0–0.2)
PLATELET # BLD AUTO: 204 10*3/MM3 (ref 140–450)
PMV BLD AUTO: 11.1 FL (ref 6–12)
POTASSIUM SERPL-SCNC: 3.7 MMOL/L (ref 3.5–5.2)
PROT SERPL-MCNC: 7.9 G/DL (ref 6–8.5)
RBC # BLD AUTO: 4.82 10*6/MM3 (ref 3.77–5.28)
SODIUM SERPL-SCNC: 140 MMOL/L (ref 136–145)
TRIGL SERPL-MCNC: 128 MG/DL (ref 0–150)
TSH SERPL DL<=0.05 MIU/L-ACNC: 6.61 UIU/ML (ref 0.27–4.2)
VLDLC SERPL-MCNC: 22 MG/DL (ref 5–40)
WBC # BLD AUTO: 9.13 10*3/MM3 (ref 3.4–10.8)

## 2021-10-29 PROCEDURE — 36415 COLL VENOUS BLD VENIPUNCTURE: CPT | Performed by: NURSE PRACTITIONER

## 2021-10-29 PROCEDURE — 80061 LIPID PANEL: CPT | Performed by: NURSE PRACTITIONER

## 2021-10-29 PROCEDURE — 85025 COMPLETE CBC W/AUTO DIFF WBC: CPT | Performed by: NURSE PRACTITIONER

## 2021-10-29 PROCEDURE — 80053 COMPREHEN METABOLIC PANEL: CPT | Performed by: NURSE PRACTITIONER

## 2021-10-29 PROCEDURE — 84443 ASSAY THYROID STIM HORMONE: CPT | Performed by: NURSE PRACTITIONER

## 2021-10-29 PROCEDURE — 99214 OFFICE O/P EST MOD 30 MIN: CPT | Performed by: NURSE PRACTITIONER

## 2021-10-29 RX ORDER — ATORVASTATIN CALCIUM 40 MG/1
40 TABLET, FILM COATED ORAL NIGHTLY
Qty: 90 TABLET | Refills: 1 | Status: SHIPPED | OUTPATIENT
Start: 2021-10-29 | End: 2022-04-19 | Stop reason: SDUPTHER

## 2021-10-29 RX ORDER — LEVOTHYROXINE SODIUM 112 UG/1
112 TABLET ORAL DAILY
Qty: 90 TABLET | Refills: 1 | Status: SHIPPED | OUTPATIENT
Start: 2021-10-29 | End: 2022-04-19

## 2021-10-29 NOTE — PROGRESS NOTES
Chief Complaint  Follow-up and Med Refill    Subjective      History of Present Illness  Italia Beard presents to [facility]     She is on disability now, needs refills, unable to do colonoscopy, mammo, shots at this time. She is willing to have labs done. She doesn't see cardiology till spring.       Past Medical History:   • COPD (chronic obstructive pulmonary disease) (HCC)   • Heart murmur       Allergies  Penicillins    Past Surgical History:   • GALLBLADDER SURGERY   • PACEMAKER IMPLANTATION   • TUBAL ABDOMINAL LIGATION       Social History     Tobacco Use   • Smoking status: Current Every Day Smoker     Packs/day: 1.50   • Smokeless tobacco: Never Used   Vaping Use   • Vaping Use: Never used   Substance Use Topics   • Alcohol use: Never   • Drug use: Never       History reviewed. No pertinent family history.     Health Maintenance Due   Topic Date Due   • COLORECTAL CANCER SCREENING  Never done   • ANNUAL PHYSICAL  Never done   • Pneumococcal Vaccine 0-64 (1 of 2 - PPSV23) Never done   • COVID-19 Vaccine (1) Never done   • TDAP/TD VACCINES (1 - Tdap) Never done   • ZOSTER VACCINE (1 of 2) Never done   • HEPATITIS C SCREENING  Never done   • INFLUENZA VACCINE  Never done   • MAMMOGRAM  10/28/2021          Current Outpatient Medications:   •  atorvastatin (LIPITOR) 40 MG tablet, Take 1 tablet by mouth Every Night., Disp: 90 tablet, Rfl: 1  •  levothyroxine (SYNTHROID, LEVOTHROID) 112 MCG tablet, Take 1 tablet by mouth Daily., Disp: 90 tablet, Rfl: 1      There is no immunization history on file for this patient.    Objective     Vitals:    10/29/21 1431   BP: 137/81   Pulse: 93   Resp: 16   Temp: 97.5 °F (36.4 °C)   SpO2: 98%     Body mass index is 26.98 kg/m².     Review of Systems    Physical Exam  Vitals reviewed.   Constitutional:       Appearance: Normal appearance. She is well-developed.   HENT:      Mouth/Throat:      Pharynx: No oropharyngeal exudate.    Cardiovascular:      Rate and Rhythm: Normal rate and regular rhythm.      Heart sounds: Normal heart sounds. No murmur heard.      Pulmonary:      Effort: Pulmonary effort is normal.      Breath sounds: Normal breath sounds.   Neurological:      Mental Status: She is alert and oriented to person, place, and time.      Cranial Nerves: No cranial nerve deficit.      Motor: No weakness.   Psychiatric:         Mood and Affect: Mood and affect normal.         Result Review :    The following data was reviewed by: FABIAN Garcia on 10/29/2021:       Depression: Not at risk   • PHQ-2 Score: 0       Common labs    Common Labsle 1/6/21 1/6/21 1/7/21 1/7/21 2/23/21    0528 0528 0502 0502    Glucose 95   94 86   BUN 10   12 13   Creatinine 0.85   0.84 1.08 (A)   Sodium 140   137 142   Potassium 3.3 (A)   4.2 4.4   Chloride 103   102 103   Calcium 10.2   9.5 9.8   Albumin 3.2 (A)    4.1   Total Bilirubin 0.36    0.41   Alkaline Phosphatase 94    107   AST (SGOT) 7 (A)    11 (A)   ALT (SGPT) <5 (A)    8 (A)   WBC   8.62  5.48   Hemoglobin   14.0  14.2   Hematocrit   42.6  44.3   Platelets   231  198   Total Cholesterol  140   123   Triglycerides  106   99   HDL Cholesterol  50   64 (A)   LDL Cholesterol   69 (A)   39 (A)   (A) Abnormal value       Comments are available for some flowsheets but are not being displayed.                           Assessment and Plan     Diagnoses and all orders for this visit:    1. Mixed hyperlipidemia (Primary)  Assessment & Plan:  Lipid abnormalities are improving with treatment.  Pharmacotherapy as ordered.  Lipids will be reassessed in 6 months.    Orders:  -     atorvastatin (LIPITOR) 40 MG tablet; Take 1 tablet by mouth Every Night.  Dispense: 90 tablet; Refill: 1  -     Comprehensive Metabolic Panel  -     Lipid Panel    2. Acquired hypothyroidism  -     levothyroxine (SYNTHROID, LEVOTHROID) 112 MCG tablet; Take 1 tablet by mouth Daily.  Dispense: 90 tablet; Refill: 1  -      TSH    3. Simple chronic bronchitis (HCC)  -     CBC & Differential    4. Colonoscopy refused    5. Refused influenza vaccine    6. Mammogram declined    7. History of complete AV block    8. Pacemaker  Assessment & Plan:  Followed by Dr mcmahan, no chest complaints            Follow Up     Return in about 6 months (around 4/29/2022).    Patient was given instructions and counseling regarding her condition or for health maintenance advice. Please see specific information pulled into the AVS if appropriate.     Italia Beard  reports that she has been smoking. She has been smoking about 1.50 packs per day. She has never used smokeless tobacco.. I have educated her on the risk of diseases from using tobacco products such as cancer, COPD and heart disease.     I advised her to quit and she is not willing to quit.    I spent 4 minutes counseling the patient.           Jazmín Dolan, FABIAN

## 2022-02-18 ENCOUNTER — TELEPHONE (OUTPATIENT)
Dept: FAMILY MEDICINE CLINIC | Facility: CLINIC | Age: 62
End: 2022-02-18

## 2022-02-18 NOTE — TELEPHONE ENCOUNTER
Caller: Italia Beard    Relationship: Self    Best call back number: [phone]     What is the medical concern/diagnosis: THYROID ISSUES     What specialty or service is being requested: ENDOCRINOLOGY     What is the provider, practice or medical service name:     What is the office location:     What is the office phone number:    Any additional details: patient would like referral from PCP

## 2022-02-22 NOTE — TELEPHONE ENCOUNTER
Called [facility] to return call to schedule an appt for endocrinology referral per Jazmín

## 2022-04-06 ENCOUNTER — OFFICE VISIT (OUTPATIENT)
Dept: FAMILY MEDICINE CLINIC | Facility: CLINIC | Age: 62
End: 2022-04-06

## 2022-04-06 VITALS
WEIGHT: 159.2 LBS | HEIGHT: 63 IN | TEMPERATURE: 98.9 F | DIASTOLIC BLOOD PRESSURE: 80 MMHG | OXYGEN SATURATION: 94 % | RESPIRATION RATE: 24 BRPM | SYSTOLIC BLOOD PRESSURE: 110 MMHG | HEART RATE: 118 BPM | BODY MASS INDEX: 28.21 KG/M2

## 2022-04-06 DIAGNOSIS — R05.9 COUGH: ICD-10-CM

## 2022-04-06 DIAGNOSIS — F17.210 CIGARETTE NICOTINE DEPENDENCE WITHOUT COMPLICATION: ICD-10-CM

## 2022-04-06 DIAGNOSIS — R50.81 FEVER IN OTHER DISEASES: Primary | ICD-10-CM

## 2022-04-06 PROBLEM — E07.9 THYROID DISORDER: Status: ACTIVE | Noted: 2022-04-06

## 2022-04-06 PROBLEM — G43.909 MIGRAINE: Status: ACTIVE | Noted: 2022-04-06

## 2022-04-06 LAB
EXPIRATION DATE: NORMAL
FLUAV AG UPPER RESP QL IA.RAPID: NOT DETECTED
FLUBV AG UPPER RESP QL IA.RAPID: NOT DETECTED
INTERNAL CONTROL: NORMAL
Lab: NORMAL
SARS-COV-2 AG UPPER RESP QL IA.RAPID: NOT DETECTED

## 2022-04-06 PROCEDURE — 87428 SARSCOV & INF VIR A&B AG IA: CPT | Performed by: NURSE PRACTITIONER

## 2022-04-06 PROCEDURE — 99213 OFFICE O/P EST LOW 20 MIN: CPT | Performed by: NURSE PRACTITIONER

## 2022-04-06 RX ORDER — PREDNISONE 20 MG/1
20 TABLET ORAL 2 TIMES DAILY
Qty: 10 TABLET | Refills: 0 | Status: SHIPPED | OUTPATIENT
Start: 2022-04-06 | End: 2022-04-11

## 2022-04-06 RX ORDER — BROMPHENIRAMINE MALEATE, PSEUDOEPHEDRINE HYDROCHLORIDE, AND DEXTROMETHORPHAN HYDROBROMIDE 2; 30; 10 MG/5ML; MG/5ML; MG/5ML
5 SYRUP ORAL 4 TIMES DAILY PRN
Qty: 240 ML | Refills: 1 | Status: SHIPPED | OUTPATIENT
Start: 2022-04-06 | End: 2022-08-09

## 2022-04-06 NOTE — PROGRESS NOTES
"Chief Complaint  Headache, Chills (STATES SYMPTOMS STARTED YESTERDAY), and Fatigue    Subjective          Italia Beard presents to [facility]  History of Present Illness  She started with headache and chill yesterday.  She has not been around anyone with flu/covid.  She said she didn't have a fever.  She has been having hot and cold flashes.  She has been under a wt'd blanket and even a heated throw.  No vomiting or diarrhea.  She has been having some coughing that started a long time--she is still a smoker.  She has not smoked but 2-3 cigs in the last day or so due to being rolled up in the covers.        Allergies  Penicillins    Social History     Tobacco Use   • Smoking status: Current Every Day Smoker     Packs/day: 1.50     Years: 40.00     Pack years: 60.00     Types: Cigarettes   • Smokeless tobacco: Never Used   Vaping Use   • Vaping Use: Never used   Substance Use Topics   • Alcohol use: Never   • Drug use: Never       No family history on file.     Health Maintenance Due   Topic Date Due   • COLORECTAL CANCER SCREENING  Never done   • ANNUAL PHYSICAL  Never done   • TDAP/TD VACCINES (1 - Tdap) Never done   • ZOSTER VACCINE (1 of 2) Never done        Immunization History   Administered Date(s) Administered   • Flu Vaccine Intradermal Quad 18-64YR 09/24/2020       Review of Systems   Constitutional: Positive for chills, diaphoresis and fever.   HENT: Positive for congestion. Negative for ear pain and sinus pressure.    Respiratory: Positive for cough. Negative for shortness of breath.    Gastrointestinal: Negative for nausea and vomiting.        Objective       Vitals:    04/06/22 0944   BP: 110/80   Pulse: 118   Resp: 24   Temp: 98.9 °F (37.2 °C)   SpO2: 94%   Weight: 72.2 kg (159 lb 3.2 oz)   Height: 160 cm (63\")       Body mass index is 28.2 kg/m².         Physical Exam  Vitals reviewed.   Constitutional:       Appearance: Normal appearance. She is " well-developed.   HENT:      Right Ear: Tympanic membrane and ear canal normal.      Left Ear: Tympanic membrane and ear canal normal.      Nose: Congestion present.      Mouth/Throat:      Pharynx: Posterior oropharyngeal erythema present.   Eyes:      Pupils: Pupils are equal, round, and reactive to light.   Cardiovascular:      Rate and Rhythm: Normal rate and regular rhythm.      Heart sounds: Normal heart sounds. No murmur heard.  Pulmonary:      Effort: Pulmonary effort is normal.      Breath sounds: Wheezing present. No rhonchi.   Neurological:      Mental Status: She is alert and oriented to person, place, and time.      Cranial Nerves: No cranial nerve deficit.      Motor: No weakness.   Psychiatric:         Mood and Affect: Mood and affect normal.             Result Review :     The following data was reviewed by: FABIAN Mendez on 04/06/2022:      Neg covid and flu               Assessment and Plan      Diagnoses and all orders for this visit:    1. Fever in other diseases (Primary)  -     POCT SARS-CoV-2 Antigen JANET + Flu  -     brompheniramine-pseudoephedrine-DM 30-2-10 MG/5ML syrup; Take 5 mL by mouth 4 (Four) Times a Day As Needed for Congestion, Cough or Allergies.  Dispense: 240 mL; Refill: 1    2. Cigarette nicotine dependence without complication  -     predniSONE (DELTASONE) 20 MG tablet; Take 1 tablet by mouth 2 (Two) Times a Day for 5 days.  Dispense: 10 tablet; Refill: 0    3. Cough  -     brompheniramine-pseudoephedrine-DM 30-2-10 MG/5ML syrup; Take 5 mL by mouth 4 (Four) Times a Day As Needed for Congestion, Cough or Allergies.  Dispense: 240 mL; Refill: 1  -     predniSONE (DELTASONE) 20 MG tablet; Take 1 tablet by mouth 2 (Two) Times a Day for 5 days.  Dispense: 10 tablet; Refill: 0            Follow Up     Return if symptoms worsen or fail to improve.  We will do a round of steroids.  Call with any concerns or questions.  If s/s do not get better by Friday and then we will do  a round of abx.  Push fluids.  Do OTC mucinex.    Patient was given instructions and counseling regarding her condition or for health maintenance advice. Please see specific information pulled into the AVS if appropriate.     Italia Beard  reports that she has been smoking cigarettes. She has a 60.00 pack-year smoking history. She has never used smokeless tobacco.. I have educated her on the risk of diseases from using tobacco products such as cancer, COPD and heart disease.     I advised her to quit and she is not willing to quit.         Cherelle Jones, APRN  04/06/2022

## 2022-04-19 ENCOUNTER — OFFICE VISIT (OUTPATIENT)
Dept: FAMILY MEDICINE CLINIC | Facility: CLINIC | Age: 62
End: 2022-04-19

## 2022-04-19 VITALS
DIASTOLIC BLOOD PRESSURE: 68 MMHG | OXYGEN SATURATION: 96 % | HEART RATE: 68 BPM | SYSTOLIC BLOOD PRESSURE: 153 MMHG | TEMPERATURE: 97.7 F | WEIGHT: 166.1 LBS | BODY MASS INDEX: 29.43 KG/M2 | RESPIRATION RATE: 20 BRPM | HEIGHT: 63 IN

## 2022-04-19 DIAGNOSIS — E78.2 MIXED HYPERLIPIDEMIA: Primary | ICD-10-CM

## 2022-04-19 DIAGNOSIS — E03.9 ACQUIRED HYPOTHYROIDISM: ICD-10-CM

## 2022-04-19 LAB
ALBUMIN SERPL-MCNC: 3.9 G/DL (ref 3.5–5.2)
ALBUMIN/GLOB SERPL: 1.4 G/DL
ALP SERPL-CCNC: 104 U/L (ref 39–117)
ALT SERPL W P-5'-P-CCNC: 12 U/L (ref 1–33)
ANION GAP SERPL CALCULATED.3IONS-SCNC: 11.4 MMOL/L (ref 5–15)
AST SERPL-CCNC: 12 U/L (ref 1–32)
BASOPHILS # BLD AUTO: 0.06 10*3/MM3 (ref 0–0.2)
BASOPHILS NFR BLD AUTO: 0.8 % (ref 0–1.5)
BILIRUB SERPL-MCNC: 0.2 MG/DL (ref 0–1.2)
BUN SERPL-MCNC: 10 MG/DL (ref 8–23)
BUN/CREAT SERPL: 10.5 (ref 7–25)
CALCIUM SPEC-SCNC: 9.8 MG/DL (ref 8.6–10.5)
CHLORIDE SERPL-SCNC: 105 MMOL/L (ref 98–107)
CHOLEST SERPL-MCNC: 167 MG/DL (ref 0–200)
CO2 SERPL-SCNC: 28.6 MMOL/L (ref 22–29)
CREAT SERPL-MCNC: 0.95 MG/DL (ref 0.57–1)
DEPRECATED RDW RBC AUTO: 39.1 FL (ref 37–54)
EGFRCR SERPLBLD CKD-EPI 2021: 68.7 ML/MIN/1.73
EOSINOPHIL # BLD AUTO: 0.11 10*3/MM3 (ref 0–0.4)
EOSINOPHIL NFR BLD AUTO: 1.5 % (ref 0.3–6.2)
ERYTHROCYTE [DISTWIDTH] IN BLOOD BY AUTOMATED COUNT: 12.5 % (ref 12.3–15.4)
GLOBULIN UR ELPH-MCNC: 2.8 GM/DL
GLUCOSE SERPL-MCNC: 79 MG/DL (ref 65–99)
HCT VFR BLD AUTO: 42.1 % (ref 34–46.6)
HDLC SERPL-MCNC: 64 MG/DL (ref 40–60)
HGB BLD-MCNC: 14 G/DL (ref 12–15.9)
IMM GRANULOCYTES # BLD AUTO: 0.05 10*3/MM3 (ref 0–0.05)
IMM GRANULOCYTES NFR BLD AUTO: 0.7 % (ref 0–0.5)
LDLC SERPL CALC-MCNC: 85 MG/DL (ref 0–100)
LDLC/HDLC SERPL: 1.3 {RATIO}
LYMPHOCYTES # BLD AUTO: 1.48 10*3/MM3 (ref 0.7–3.1)
LYMPHOCYTES NFR BLD AUTO: 19.7 % (ref 19.6–45.3)
MCH RBC QN AUTO: 29 PG (ref 26.6–33)
MCHC RBC AUTO-ENTMCNC: 33.3 G/DL (ref 31.5–35.7)
MCV RBC AUTO: 87.2 FL (ref 79–97)
MONOCYTES # BLD AUTO: 0.52 10*3/MM3 (ref 0.1–0.9)
MONOCYTES NFR BLD AUTO: 6.9 % (ref 5–12)
NEUTROPHILS NFR BLD AUTO: 5.29 10*3/MM3 (ref 1.7–7)
NEUTROPHILS NFR BLD AUTO: 70.4 % (ref 42.7–76)
NRBC BLD AUTO-RTO: 0 /100 WBC (ref 0–0.2)
PLATELET # BLD AUTO: 223 10*3/MM3 (ref 140–450)
PMV BLD AUTO: 10.8 FL (ref 6–12)
POTASSIUM SERPL-SCNC: 4.5 MMOL/L (ref 3.5–5.2)
PROT SERPL-MCNC: 6.7 G/DL (ref 6–8.5)
RBC # BLD AUTO: 4.83 10*6/MM3 (ref 3.77–5.28)
SODIUM SERPL-SCNC: 145 MMOL/L (ref 136–145)
T4 FREE SERPL-MCNC: 1.51 NG/DL (ref 0.93–1.7)
TRIGL SERPL-MCNC: 100 MG/DL (ref 0–150)
TSH SERPL DL<=0.05 MIU/L-ACNC: 6.36 UIU/ML (ref 0.27–4.2)
VLDLC SERPL-MCNC: 18 MG/DL (ref 5–40)
WBC NRBC COR # BLD: 7.51 10*3/MM3 (ref 3.4–10.8)

## 2022-04-19 PROCEDURE — 80061 LIPID PANEL: CPT | Performed by: NURSE PRACTITIONER

## 2022-04-19 PROCEDURE — 84439 ASSAY OF FREE THYROXINE: CPT | Performed by: NURSE PRACTITIONER

## 2022-04-19 PROCEDURE — 99214 OFFICE O/P EST MOD 30 MIN: CPT | Performed by: NURSE PRACTITIONER

## 2022-04-19 PROCEDURE — 84443 ASSAY THYROID STIM HORMONE: CPT | Performed by: NURSE PRACTITIONER

## 2022-04-19 PROCEDURE — 80053 COMPREHEN METABOLIC PANEL: CPT | Performed by: NURSE PRACTITIONER

## 2022-04-19 PROCEDURE — 85025 COMPLETE CBC W/AUTO DIFF WBC: CPT | Performed by: NURSE PRACTITIONER

## 2022-04-19 RX ORDER — LEVOTHYROXINE SODIUM 0.12 MG/1
125 TABLET ORAL DAILY
Qty: 90 TABLET | Refills: 1 | Status: SHIPPED | OUTPATIENT
Start: 2022-04-19 | End: 2022-08-09 | Stop reason: SDUPTHER

## 2022-04-19 RX ORDER — ATORVASTATIN CALCIUM 40 MG/1
40 TABLET, FILM COATED ORAL NIGHTLY
Qty: 90 TABLET | Refills: 1 | Status: SHIPPED | OUTPATIENT
Start: 2022-04-19 | End: 2022-08-09 | Stop reason: SDUPTHER

## 2022-04-19 RX ORDER — FERROUS SULFATE 325(65) MG
325 TABLET ORAL
COMMUNITY

## 2022-04-19 RX ORDER — SUMATRIPTAN 100 MG/1
100 TABLET, FILM COATED ORAL AS NEEDED
COMMUNITY
End: 2023-02-07 | Stop reason: SDUPTHER

## 2022-04-19 NOTE — PROGRESS NOTES
"Chief Complaint  Hypothyroidism    Subjective          Italia Beard presents to [facility]  History of Present Illness  She is needing a refill of her atorvastatin and Synthroid.  Is fasting for blood work.  She feels fine with current dose but we will adjust medicine accordingly.  She originally wanted a referral to endocrinology but we will manage currently and go from there.  Allergies  Penicillins    Social History     Tobacco Use   • Smoking status: Current Every Day Smoker     Packs/day: 1.50     Years: 40.00     Pack years: 60.00     Types: Cigarettes   • Smokeless tobacco: Never Used   Vaping Use   • Vaping Use: Never used   Substance Use Topics   • Alcohol use: Never   • Drug use: Never       History reviewed. No pertinent family history.     Health Maintenance Due   Topic Date Due   • COLORECTAL CANCER SCREENING  Never done   • ANNUAL PHYSICAL  Never done   • LUNG CANCER SCREENING  01/05/2022        Immunization History   Administered Date(s) Administered   • Flu Vaccine Intradermal Quad 18-64YR 09/24/2020   • Hepatitis A 05/18/2018       Review of Systems   Constitutional: Negative for fatigue.   Respiratory: Negative for cough and shortness of breath.    Cardiovascular: Negative for chest pain.   Gastrointestinal: Negative for diarrhea, nausea and vomiting.        Objective       Vitals:    04/19/22 0756   BP: 153/68   Pulse: 68   Resp: 20   Temp: 97.7 °F (36.5 °C)   SpO2: 96%   Weight: 75.3 kg (166 lb 1.6 oz)   Height: 160 cm (63\")       Body mass index is 29.42 kg/m².         Physical Exam  Vitals reviewed.   Constitutional:       Appearance: Normal appearance. She is well-developed.   Cardiovascular:      Rate and Rhythm: Normal rate and regular rhythm.      Heart sounds: Normal heart sounds. No murmur heard.  Pulmonary:      Effort: Pulmonary effort is normal.      Breath sounds: Normal breath sounds.   Neurological:      Mental Status: She is alert and " oriented to person, place, and time.      Cranial Nerves: No cranial nerve deficit.      Motor: No weakness.   Psychiatric:         Mood and Affect: Mood and affect normal.             Result Review :     The following data was reviewed by: FABIAN Mendez on 04/19/2022:    Common labs    Common Labsle 10/29/21 10/29/21 10/29/21    1510 1510 1510   Glucose  108 (A)    BUN  14    Creatinine  1.28 (A)    eGFR Non African Am  43 (A)    Sodium  140    Potassium  3.7    Chloride  101    Calcium  9.8    Albumin  4.40    Total Bilirubin  0.4    Alkaline Phosphatase  112    AST (SGOT)  11    ALT (SGPT)  8    WBC 9.13     Hemoglobin 14.6     Hematocrit 42.0     Platelets 204     Total Cholesterol   123   Triglycerides   128   HDL Cholesterol   55   LDL Cholesterol    46   (A) Abnormal value                               Assessment and Plan      Diagnoses and all orders for this visit:    1. Mixed hyperlipidemia (Primary)  -     Comprehensive Metabolic Panel  -     CBC & Differential  -     Lipid Panel  -     atorvastatin (LIPITOR) 40 MG tablet; Take 1 tablet by mouth Every Night.  Dispense: 90 tablet; Refill: 1    2. Acquired hypothyroidism  -     Comprehensive Metabolic Panel  -     CBC & Differential  -     TSH  -     T4, Free            Follow Up     Return in about 6 months (around 10/19/2022).  We will adjust the thyroid med with the labs and I didn't send rx over.  F>U in 6 months and go from there.    Patient was given instructions and counseling regarding her condition or for health maintenance advice. Please see specific information pulled into the AVS if appropriate.     Italia DURANT Kisha  reports that she has been smoking cigarettes. She has a 60.00 pack-year smoking history. She has never used smokeless tobacco.. I have educated her on the risk of diseases from using tobacco products such as cancer, COPD and heart disease.     I advised her to quit and she is not willing to quit.    Cherelle  Karen, APRN  04/19/2022

## 2022-04-20 ENCOUNTER — OFFICE VISIT (OUTPATIENT)
Dept: CARDIOLOGY | Facility: CLINIC | Age: 62
End: 2022-04-20

## 2022-04-20 ENCOUNTER — CLINICAL SUPPORT NO REQUIREMENTS (OUTPATIENT)
Dept: CARDIOLOGY | Facility: CLINIC | Age: 62
End: 2022-04-20

## 2022-04-20 VITALS
BODY MASS INDEX: 27.83 KG/M2 | DIASTOLIC BLOOD PRESSURE: 95 MMHG | HEIGHT: 64 IN | SYSTOLIC BLOOD PRESSURE: 153 MMHG | HEART RATE: 86 BPM | WEIGHT: 163 LBS

## 2022-04-20 DIAGNOSIS — Z95.0 PRESENCE OF CARDIAC PACEMAKER: ICD-10-CM

## 2022-04-20 DIAGNOSIS — I10 HYPERTENSION, ESSENTIAL: ICD-10-CM

## 2022-04-20 DIAGNOSIS — I49.5 SSS (SICK SINUS SYNDROME): ICD-10-CM

## 2022-04-20 DIAGNOSIS — Z95.0 PACEMAKER: Primary | ICD-10-CM

## 2022-04-20 DIAGNOSIS — F17.200 SMOKER: ICD-10-CM

## 2022-04-20 DIAGNOSIS — I44.1 AV BLOCK, MOBITZ II: Primary | ICD-10-CM

## 2022-04-20 PROCEDURE — 99406 BEHAV CHNG SMOKING 3-10 MIN: CPT | Performed by: INTERNAL MEDICINE

## 2022-04-20 PROCEDURE — 99214 OFFICE O/P EST MOD 30 MIN: CPT | Performed by: INTERNAL MEDICINE

## 2022-04-20 PROCEDURE — 93280 PM DEVICE PROGR EVAL DUAL: CPT | Performed by: INTERNAL MEDICINE

## 2022-04-20 NOTE — PATIENT INSTRUCTIONS
"Low-Sodium Eating Plan  Sodium, which is an element that makes up salt, helps you maintain a healthy balance of fluids in your body. Too much sodium can increase your blood pressure and cause fluid and waste to be held in your body.  Your health care provider or dietitian may recommend following this plan if you have high blood pressure (hypertension), kidney disease, liver disease, or heart failure. Eating less sodium can help lower your blood pressure, reduce swelling, and protect your heart, liver, and kidneys.  What are tips for following this plan?  Reading food labels  The Nutrition Facts label lists the amount of sodium in one serving of the food. If you eat more than one serving, you must multiply the listed amount of sodium by the number of servings.  Choose foods with less than 140 mg of sodium per serving.  Avoid foods with 300 mg of sodium or more per serving.  Shopping    Look for lower-sodium products, often labeled as \"low-sodium\" or \"no salt added.\"  Always check the sodium content, even if foods are labeled as \"unsalted\" or \"no salt added.\"  Buy fresh foods.  Avoid canned foods and pre-made or frozen meals.  Avoid canned, cured, or processed meats.  Buy breads that have less than 80 mg of sodium per slice.    Cooking    Eat more home-cooked food and less restaurant, buffet, and fast food.  Avoid adding salt when cooking. Use salt-free seasonings or herbs instead of table salt or sea salt. Check with your health care provider or pharmacist before using salt substitutes.  Cook with plant-based oils, such as canola, sunflower, or olive oil.    Meal planning  When eating at a restaurant, ask that your food be prepared with less salt or no salt, if possible. Avoid dishes labeled as brined, pickled, cured, smoked, or made with soy sauce, miso, or teriyaki sauce.  Avoid foods that contain MSG (monosodium glutamate). MSG is sometimes added to Chinese food, bouillon, and some canned foods.  Make meals that " "can be grilled, baked, poached, roasted, or steamed. These are generally made with less sodium.  General information  Most people on this plan should limit their sodium intake to 1,500-2,000 mg (milligrams) of sodium each day.  What foods should I eat?  Fruits  Fresh, frozen, or canned fruit. Fruit juice.  Vegetables  Fresh or frozen vegetables. \"No salt added\" canned vegetables. \"No salt added\" tomato sauce and paste. Low-sodium or reduced-sodium tomato and vegetable juice.  Grains  Low-sodium cereals, including oats, puffed wheat and rice, and shredded wheat. Low-sodium crackers. Unsalted rice. Unsalted pasta. Low-sodium bread. Whole-grain breads and whole-grain pasta.  Meats and other proteins  Fresh or frozen (no salt added) meat, poultry, seafood, and fish. Low-sodium canned tuna and salmon. Unsalted nuts. Dried peas, beans, and lentils without added salt. Unsalted canned beans. Eggs. Unsalted nut butters.  Dairy  Milk. Soy milk. Cheese that is naturally low in sodium, such as ricotta cheese, fresh mozzarella, or Swiss cheese. Low-sodium or reduced-sodium cheese. Cream cheese. Yogurt.  Seasonings and condiments  Fresh and dried herbs and spices. Salt-free seasonings. Low-sodium mustard and ketchup. Sodium-free salad dressing. Sodium-free light mayonnaise. Fresh or refrigerated horseradish. Lemon juice. Vinegar.  Other foods  Homemade, reduced-sodium, or low-sodium soups. Unsalted popcorn and pretzels. Low-salt or salt-free chips.  The items listed above may not be a complete list of foods and beverages you can eat. Contact a dietitian for more information.  What foods should I avoid?  Vegetables  Sauerkraut, pickled vegetables, and relishes. Olives. French fries. Onion rings. Regular canned vegetables (not low-sodium or reduced-sodium). Regular canned tomato sauce and paste (not low-sodium or reduced-sodium). Regular tomato and vegetable juice (not low-sodium or reduced-sodium). Frozen vegetables in " sauces.  Grains  Instant hot cereals. Bread stuffing, pancake, and biscuit mixes. Croutons. Seasoned rice or pasta mixes. Noodle soup cups. Boxed or frozen macaroni and cheese. Regular salted crackers. Self-rising flour.  Meats and other proteins  Meat or fish that is salted, canned, smoked, spiced, or pickled. Precooked or cured meat, such as sausages or meat loaves. Delgado. Ham. Pepperoni. Hot dogs. Corned beef. Chipped beef. Salt pork. Jerky. Pickled herring. Anchovies and sardines. Regular canned tuna. Salted nuts.  Dairy  Processed cheese and cheese spreads. Hard cheeses. Cheese curds. Blue cheese. Feta cheese. String cheese. Regular cottage cheese. Buttermilk. Canned milk.  Fats and oils  Salted butter. Regular margarine. Ghee. Delgado fat.  Seasonings and condiments  Onion salt, garlic salt, seasoned salt, table salt, and sea salt. Canned and packaged gravies. Worcestershire sauce. Tartar sauce. Barbecue sauce. Teriyaki sauce. Soy sauce, including reduced-sodium. Steak sauce. Fish sauce. Oyster sauce. Cocktail sauce. Horseradish that you find on the shelf. Regular ketchup and mustard. Meat flavorings and tenderizers. Bouillon cubes. Hot sauce. Pre-made or packaged marinades. Pre-made or packaged taco seasonings. Relishes. Regular salad dressings. Salsa.  Other foods  Salted popcorn and pretzels. Corn chips and puffs. Potato and tortilla chips. Canned or dried soups. Pizza. Frozen entrees and pot pies.  The items listed above may not be a complete list of foods and beverages you should avoid. Contact a dietitian for more information.  Summary  Eating less sodium can help lower your blood pressure, reduce swelling, and protect your heart, liver, and kidneys.  Most people on this plan should limit their sodium intake to 1,500-2,000 mg (milligrams) of sodium each day.  Canned, boxed, and frozen foods are high in sodium. Restaurant foods, fast foods, and pizza are also very high in sodium. You also get sodium by  adding salt to food.  Try to cook at home, eat more fresh fruits and vegetables, and eat less fast food and canned, processed, or prepared foods.  This information is not intended to replace advice given to you by your health care provider. Make sure you discuss any questions you have with your health care provider.  Document Revised: 01/22/2021 Document Reviewed: 11/18/2020  [employer] Patient Education © 2021 [employer]  Steps to Quit Smoking  Smoking tobacco is the leading cause of preventable death. It can affect almost every organ in the body. Smoking puts you and those around you at risk for developing many serious chronic diseases. Quitting smoking can be difficult, but it is one of the best things that you can do for your health. It is never too late to quit.  How do I get ready to quit?  When you decide to quit smoking, create a plan to help you succeed. Before you quit:  Pick a date to quit. Set a date within the next 2 weeks to give you time to prepare.  Write down the reasons why you are quitting. Keep this list in places where you will see it often.  Tell your family, friends, and co-workers that you are quitting. Support from your loved ones can make quitting easier.  Talk with your health care provider about your options for quitting smoking.  Find out what treatment options are covered by your health insurance.  Identify people, places, things, and activities that make you want to smoke (triggers). Avoid them.  What first steps can I take to quit smoking?  Throw away all cigarettes at home, at work, and in your car.  Throw away smoking accessories, such as ashtrays and lighters.  Clean your car. Make sure to empty the ashtray.  Clean your home, including curtains and carpets.  What strategies can I use to quit smoking?  Talk with your health care provider about combining strategies, such as taking medicines while you are also receiving in-person counseling. Using these two strategies together makes  you more likely to succeed in quitting than if you used either strategy on its own.  If you are pregnant or breastfeeding, talk with your health care provider about finding counseling or other support strategies to quit smoking. Do not take medicine to help you quit smoking unless your health care provider tells you to do so.  To quit smoking:  Quit right away  Quit smoking completely, instead of gradually reducing how much you smoke over a period of time. Research shows that stopping smoking right away is more successful than gradually quitting.  Attend in-person counseling to help you build problem-solving skills. You are more likely to succeed in quitting if you attend counseling sessions regularly. Even short sessions of 10 minutes can be effective.  Take medicine  You may take medicines to help you quit smoking. Some medicines require a prescription and some you can purchase over-the-counter. Medicines may have nicotine in them to replace the nicotine in cigarettes. Medicines may:  Help to stop cravings.  Help to relieve withdrawal symptoms.  Your health care provider may recommend:  Nicotine patches, gum, or lozenges.  Nicotine inhalers or sprays.  Non-nicotine medicine that is taken by mouth.  Find resources  Find resources and support systems that can help you to quit smoking and remain smoke-free after you quit. These resources are most helpful when you use them often. They include:  Online chats with a counselor.  Telephone quitlines.  Printed self-help materials.  Support groups or group counseling.  Text messaging programs.  Mobile phone apps or applications. Use apps that can help you stick to your quit plan by providing reminders, tips, and encouragement. There are many free apps for mobile devices as well as websites. Examples include Quit Guide from the CDC and smokefree.gov  What things can I do to make it easier to quit?    Reach out to your family and friends for support and encouragement. Call  telephone quitlines (4-494-QUIT-NOW), reach out to support groups, or work with a counselor for support.  Ask people who smoke to avoid smoking around you.  Avoid places that trigger you to smoke, such as bars, parties, or smoke-break areas at work.  Spend time with people who do not smoke.  Lessen the stress in your life. Stress can be a smoking trigger for some people. To lessen stress, try:  Exercising regularly.  Doing deep-breathing exercises.  Doing yoga.  Meditating.  Performing a body scan. This involves closing your eyes, scanning your body from head to toe, and noticing which parts of your body are particularly tense. Try to relax the muscles in those areas.  How will I feel when I quit smoking?  Day 1 to 3 weeks  Within the first 24 hours of quitting smoking, you may start to feel withdrawal symptoms. These symptoms are usually most noticeable 2-3 days after quitting, but they usually do not last for more than 2-3 weeks. You may experience these symptoms:  Mood swings.  Restlessness, anxiety, or irritability.  Trouble concentrating.  Dizziness.  Strong cravings for sugary foods and nicotine.  Mild weight gain.  Constipation.  Nausea.  Coughing or a sore throat.  Changes in how the medicines that you take for unrelated issues work in your body.  Depression.  Trouble sleeping (insomnia).  Week 3 and afterward  After the first 2-3 weeks of quitting, you may start to notice more positive results, such as:  Improved sense of smell and taste.  Decreased coughing and sore throat.  Slower heart rate.  Lower blood pressure.  Clearer skin.  The ability to breathe more easily.  Fewer sick days.  Quitting smoking can be very challenging. Do not get discouraged if you are not successful the first time. Some people need to make many attempts to quit before they achieve long-term success. Do your best to stick to your quit plan, and talk with your health care provider if you have any questions or  concerns.  Summary  Smoking tobacco is the leading cause of preventable death. Quitting smoking is one of the best things that you can do for your health.  When you decide to quit smoking, create a plan to help you succeed.  Quit smoking right away, not slowly over a period of time.  When you start quitting, seek help from your health care provider, family, or friends.  This information is not intended to replace advice given to you by your health care provider. Make sure you discuss any questions you have with your health care provider.  Document Revised: 09/11/2020 Document Reviewed: 03/07/2020  [employer]vier Patient Education © 2021 [employer]

## 2022-04-20 NOTE — PROGRESS NOTES
Chief Complaint  High Grade AV Block , Pacemaker Check, and COPD    Subjective            Italia Beard presents to [facility]  History of Present Illness    Italia is here for follow-up evaluation management of high-grade AV block, dual-chamber permanent pacemaker, smoking.  Since last visit she is doing relatively well.  She has had mild bronchitis recently.  She denies chest pain or palpitations.    PMH  Past Medical History:   Diagnosis Date   • COPD (chronic obstructive pulmonary disease) (HCC)    • Heart murmur          SURGICALHX  Past Surgical History:   Procedure Laterality Date   • GALLBLADDER SURGERY     • PACEMAKER IMPLANTATION     • TUBAL ABDOMINAL LIGATION          SOC  Social History     Socioeconomic History   • Marital status:    Tobacco Use   • Smoking status: Current Every Day Smoker     Packs/day: 1.50     Years: 40.00     Pack years: 60.00     Types: Cigarettes   • Smokeless tobacco: Never Used   Vaping Use   • Vaping Use: Never used   Substance and Sexual Activity   • Alcohol use: Never   • Drug use: Never   • Sexual activity: Defer         FAMHX  History reviewed. No pertinent family history.       ALLERGY  Allergies   Allergen Reactions   • Penicillins Hives        MEDSCURRENT    Current Outpatient Medications:   •  atorvastatin (LIPITOR) 40 MG tablet, Take 1 tablet by mouth Every Night., Disp: 90 tablet, Rfl: 1  •  brompheniramine-pseudoephedrine-DM 30-2-10 MG/5ML syrup, Take 5 mL by mouth 4 (Four) Times a Day As Needed for Congestion, Cough or Allergies., Disp: 240 mL, Rfl: 1  •  cyanocobalamin (VITAMIN B-12) 1000 MCG tablet, Take 1,000 mcg by mouth Daily., Disp: , Rfl:   •  ferrous sulfate 325 (65 FE) MG tablet, Take 325 mg by mouth Daily With Breakfast., Disp: , Rfl:   •  levothyroxine (Synthroid) 125 MCG tablet, Take 1 tablet by mouth Daily., Disp: 90 tablet, Rfl: 1  •  Nutritional Supplements (EQUATE PO), Take  by mouth., Disp: , Rfl:   •   "SUMAtriptan (IMITREX) 100 MG tablet, Take 100 mg by mouth As Needed., Disp: , Rfl:       Review of Systems   Cardiovascular: Negative for chest pain.   Respiratory: Positive for cough and shortness of breath.         Objective     /95   Pulse 86   Ht 162.6 cm (64\")   Wt 73.9 kg (163 lb)   BMI 27.98 kg/m²       General Appearance:   · well developed  · well nourished  HENT:   · oropharynx moist  · lips not cyanotic  Neck:  · thyroid not enlarged  · supple  Respiratory:  · no respiratory distress  · normal breath sounds  · no rales  Cardiovascular:  · no jugular venous distention  · regular rhythm  · apical impulse normal  · S1 normal, S2 normal  · no S3, no S4   · no murmur  · no rub, no thrill  · carotid pulses normal; no bruit  · pedal pulses normal  · lower extremity edema: none    Musculoskeletal:  · no clubbing of fingers.   · normocephalic, head atraumatic  Skin:   · warm, dry  Psychiatric:  · judgement and insight appropriate  · normal mood and affect      Result Review :     The following data was reviewed by: Cain Artis MD on 04/20/2022:    CMP    CMP 10/29/21 4/19/22   Glucose 108 (A) 79   BUN 14 10   Creatinine 1.28 (A) 0.95   eGFR Non African Am 43 (A)    Sodium 140 145   Potassium 3.7 4.5   Chloride 101 105   Calcium 9.8 9.8   Albumin 4.40 3.90   Total Bilirubin 0.4 0.2   Alkaline Phosphatase 112 104   AST (SGOT) 11 12   ALT (SGPT) 8 12   (A) Abnormal value            CBC    CBC 10/29/21 4/19/22   WBC 9.13 7.51   RBC 4.82 4.83   Hemoglobin 14.6 14.0   Hematocrit 42.0 42.1   MCV 87.1 87.2   MCH 30.3 29.0   MCHC 34.8 33.3   RDW 12.4 12.5   Platelets 204 223           Lipid Panel    Lipid Panel 10/29/21 4/19/22   Total Cholesterol 123 167   Triglycerides 128 100   HDL Cholesterol 55 64 (A)   VLDL Cholesterol 22 18   LDL Cholesterol  46 85   LDL/HDL Ratio 0.77 1.30   (A) Abnormal value            TSH    TSH 10/29/21 4/19/22   TSH 6.610 (A) 6.360 (A)   (A) Abnormal value        "       Data reviewed: Primary care records reviewed, pacemaker remote monitoring reviewed.  Pacemaker interrogation today shows normal device function, no arrhythmia episodes, adequate battery life     Procedures      Italia Beard  reports that she has been smoking cigarettes. She has a 60.00 pack-year smoking history. She has never used smokeless tobacco.. I have educated her on the risk of diseases from using tobacco products such as cancer, COPD and heart disease.     I advised her to quit and she is willing to quit. We have discussed the following method/s for tobacco cessation:  Counseling.  Together we have set a quit date for When she weans down to 0 cigarettes..  She will follow up with me in several months or sooner to check on her progress.    I spent 3  minutes counseling the patient.                Assessment and Plan        ASSESSMENT:  Encounter Diagnoses   Name Primary?   • AV block, Mobitz II Yes   • Presence of cardiac pacemaker    • Hypertension, essential    • Smoker          PLAN:    1.  Italia has AV block, it is stable with the dual-chamber permanent pacemaker.  The device is functioning normally.  Continue remote monitoring.  2.  Hypertension, not currently treated, I told her to start measuring her blood pressure at home a couple times per week.  Keep a rolling average, if over the next 4 to 6 weeks with low-sodium nutrition it is still averaging greater than 140/90, medical therapy would be recommended at that point.  3.  Smoking cessation counseling.    Annual follow-up will be arranged otherwise          Patient was given instructions and counseling regarding her condition or for health maintenance advice. Please see specific information pulled into the AVS if appropriate.             HÉCTOR Artis MD  4/20/2022    09:41 EDT

## 2022-08-09 ENCOUNTER — OFFICE VISIT (OUTPATIENT)
Dept: FAMILY MEDICINE CLINIC | Facility: CLINIC | Age: 62
End: 2022-08-09

## 2022-08-09 VITALS
OXYGEN SATURATION: 99 % | TEMPERATURE: 97.3 F | WEIGHT: 162 LBS | DIASTOLIC BLOOD PRESSURE: 74 MMHG | SYSTOLIC BLOOD PRESSURE: 126 MMHG | BODY MASS INDEX: 27.66 KG/M2 | HEIGHT: 64 IN | RESPIRATION RATE: 16 BRPM | HEART RATE: 64 BPM

## 2022-08-09 DIAGNOSIS — E03.9 ACQUIRED HYPOTHYROIDISM: ICD-10-CM

## 2022-08-09 DIAGNOSIS — E78.2 MIXED HYPERLIPIDEMIA: Primary | ICD-10-CM

## 2022-08-09 LAB
ALBUMIN SERPL-MCNC: 4.3 G/DL (ref 3.5–5.2)
ALBUMIN/GLOB SERPL: 1.6 G/DL
ALP SERPL-CCNC: 126 U/L (ref 39–117)
ALT SERPL W P-5'-P-CCNC: 11 U/L (ref 1–33)
ANION GAP SERPL CALCULATED.3IONS-SCNC: 12 MMOL/L (ref 5–15)
AST SERPL-CCNC: 17 U/L (ref 1–32)
BASOPHILS # BLD AUTO: 0.1 10*3/MM3 (ref 0–0.2)
BASOPHILS NFR BLD AUTO: 1.3 % (ref 0–1.5)
BILIRUB SERPL-MCNC: 0.5 MG/DL (ref 0–1.2)
BUN SERPL-MCNC: 9 MG/DL (ref 8–23)
BUN/CREAT SERPL: 8.1 (ref 7–25)
CALCIUM SPEC-SCNC: 10.1 MG/DL (ref 8.6–10.5)
CHLORIDE SERPL-SCNC: 102 MMOL/L (ref 98–107)
CHOLEST SERPL-MCNC: 124 MG/DL (ref 0–200)
CO2 SERPL-SCNC: 27 MMOL/L (ref 22–29)
CREAT SERPL-MCNC: 1.11 MG/DL (ref 0.57–1)
DEPRECATED RDW RBC AUTO: 40.2 FL (ref 37–54)
EGFRCR SERPLBLD CKD-EPI 2021: 57 ML/MIN/1.73
EOSINOPHIL # BLD AUTO: 0.07 10*3/MM3 (ref 0–0.4)
EOSINOPHIL NFR BLD AUTO: 0.9 % (ref 0.3–6.2)
ERYTHROCYTE [DISTWIDTH] IN BLOOD BY AUTOMATED COUNT: 12.4 % (ref 12.3–15.4)
GLOBULIN UR ELPH-MCNC: 2.7 GM/DL
GLUCOSE SERPL-MCNC: 86 MG/DL (ref 65–99)
HCT VFR BLD AUTO: 44.6 % (ref 34–46.6)
HDLC SERPL-MCNC: 70 MG/DL (ref 40–60)
HGB BLD-MCNC: 14.9 G/DL (ref 12–15.9)
IMM GRANULOCYTES # BLD AUTO: 0.03 10*3/MM3 (ref 0–0.05)
IMM GRANULOCYTES NFR BLD AUTO: 0.4 % (ref 0–0.5)
LDLC SERPL CALC-MCNC: 37 MG/DL (ref 0–100)
LDLC/HDLC SERPL: 0.51 {RATIO}
LYMPHOCYTES # BLD AUTO: 1.95 10*3/MM3 (ref 0.7–3.1)
LYMPHOCYTES NFR BLD AUTO: 25.9 % (ref 19.6–45.3)
MCH RBC QN AUTO: 30 PG (ref 26.6–33)
MCHC RBC AUTO-ENTMCNC: 33.4 G/DL (ref 31.5–35.7)
MCV RBC AUTO: 89.7 FL (ref 79–97)
MONOCYTES # BLD AUTO: 0.44 10*3/MM3 (ref 0.1–0.9)
MONOCYTES NFR BLD AUTO: 5.8 % (ref 5–12)
NEUTROPHILS NFR BLD AUTO: 4.94 10*3/MM3 (ref 1.7–7)
NEUTROPHILS NFR BLD AUTO: 65.7 % (ref 42.7–76)
NRBC BLD AUTO-RTO: 0 /100 WBC (ref 0–0.2)
PLATELET # BLD AUTO: 183 10*3/MM3 (ref 140–450)
PMV BLD AUTO: 12.4 FL (ref 6–12)
POTASSIUM SERPL-SCNC: 4.7 MMOL/L (ref 3.5–5.2)
PROT SERPL-MCNC: 7 G/DL (ref 6–8.5)
RBC # BLD AUTO: 4.97 10*6/MM3 (ref 3.77–5.28)
SODIUM SERPL-SCNC: 141 MMOL/L (ref 136–145)
T4 FREE SERPL-MCNC: 1.49 NG/DL (ref 0.93–1.7)
TRIGL SERPL-MCNC: 90 MG/DL (ref 0–150)
TSH SERPL DL<=0.05 MIU/L-ACNC: 1.28 UIU/ML (ref 0.27–4.2)
VLDLC SERPL-MCNC: 17 MG/DL (ref 5–40)
WBC NRBC COR # BLD: 7.53 10*3/MM3 (ref 3.4–10.8)

## 2022-08-09 PROCEDURE — 80061 LIPID PANEL: CPT | Performed by: NURSE PRACTITIONER

## 2022-08-09 PROCEDURE — 99213 OFFICE O/P EST LOW 20 MIN: CPT | Performed by: NURSE PRACTITIONER

## 2022-08-09 PROCEDURE — 84443 ASSAY THYROID STIM HORMONE: CPT | Performed by: NURSE PRACTITIONER

## 2022-08-09 PROCEDURE — 85025 COMPLETE CBC W/AUTO DIFF WBC: CPT | Performed by: NURSE PRACTITIONER

## 2022-08-09 PROCEDURE — 84439 ASSAY OF FREE THYROXINE: CPT | Performed by: NURSE PRACTITIONER

## 2022-08-09 PROCEDURE — 80053 COMPREHEN METABOLIC PANEL: CPT | Performed by: NURSE PRACTITIONER

## 2022-08-09 RX ORDER — LEVOTHYROXINE SODIUM 0.12 MG/1
125 TABLET ORAL DAILY
Qty: 90 TABLET | Refills: 1 | Status: SHIPPED | OUTPATIENT
Start: 2022-08-09 | End: 2023-02-07 | Stop reason: SDUPTHER

## 2022-08-09 RX ORDER — ATORVASTATIN CALCIUM 40 MG/1
40 TABLET, FILM COATED ORAL NIGHTLY
Qty: 90 TABLET | Refills: 1 | Status: SHIPPED | OUTPATIENT
Start: 2022-08-09 | End: 2023-02-07 | Stop reason: SDUPTHER

## 2022-08-09 NOTE — PROGRESS NOTES
"Chief Complaint  Hyperlipidemia and Hypothyroidism    Subjective          Italia Beard presents to [facility]  History of Present Illness  She is fasting for labs today.  She has been taking her Synthroid daily along with her Lipitor.  She is not having any chest pain or shortness of breath noted.  She does currently smoke and is not ready to stop smoking.  She has a pacemaker and does see cardiology    She did take custody of her granddaughter at 6 mths and now almost 5 years old.    Depression: Not at risk   • PHQ-2 Score: 0           Allergies  Penicillins    Social History     Tobacco Use   • Smoking status: Current Every Day Smoker     Packs/day: 1.50     Years: 40.00     Pack years: 60.00     Types: Cigarettes   • Smokeless tobacco: Never Used   Vaping Use   • Vaping Use: Never used   Substance Use Topics   • Alcohol use: Never   • Drug use: Never       No family history on file.     Health Maintenance Due   Topic Date Due   • ANNUAL PHYSICAL  Never done   • Pneumococcal Vaccine 0-64 (1 - PCV) Never done   • ZOSTER VACCINE (1 of 2) Never done   • HEPATITIS C SCREENING  Never done   • LUNG CANCER SCREENING  01/05/2022        Immunization History   Administered Date(s) Administered   • Flu Vaccine Intradermal Quad 18-64YR 09/24/2020   • Hepatitis A 05/18/2018       Review of Systems   Constitutional: Negative for fatigue.   Respiratory: Negative for cough and shortness of breath.    Cardiovascular: Negative for chest pain.   Gastrointestinal: Negative for diarrhea, nausea and vomiting.        Objective       Vitals:    08/09/22 1434   BP: 126/74   Pulse: 64   Resp: 16   Temp: 97.3 °F (36.3 °C)   SpO2: 99%   Weight: 73.5 kg (162 lb)   Height: 162.6 cm (64\")       Body mass index is 27.81 kg/m².         Physical Exam  Vitals reviewed.   Constitutional:       Appearance: Normal appearance. She is well-developed.   Cardiovascular:      Rate and Rhythm: Normal rate and " regular rhythm.      Heart sounds: Normal heart sounds. No murmur heard.  Pulmonary:      Effort: Pulmonary effort is normal.      Breath sounds: Normal breath sounds.   Neurological:      Mental Status: She is alert and oriented to person, place, and time.      Cranial Nerves: No cranial nerve deficit.      Motor: No weakness.   Psychiatric:         Mood and Affect: Mood and affect normal.             Result Review :     The following data was reviewed by: FABIAN Mendez on 08/09/2022:    Common labs    Common Labsle 10/29/21 10/29/21 10/29/21 4/19/22 4/19/22 4/19/22    1510 1510 1510 0836 0836 0836   Glucose  108 (A)  79     BUN  14  10     Creatinine  1.28 (A)  0.95     eGFR Non African Am  43 (A)       Sodium  140  145     Potassium  3.7  4.5     Chloride  101  105     Calcium  9.8  9.8     Albumin  4.40  3.90     Total Bilirubin  0.4  0.2     Alkaline Phosphatase  112  104     AST (SGOT)  11  12     ALT (SGPT)  8  12     WBC 9.13     7.51   Hemoglobin 14.6     14.0   Hematocrit 42.0     42.1   Platelets 204     223   Total Cholesterol   123  167    Triglycerides   128  100    HDL Cholesterol   55  64 (A)    LDL Cholesterol    46  85    (A) Abnormal value                           Assessment and Plan      Diagnoses and all orders for this visit:    1. Mixed hyperlipidemia (Primary)  -     Comprehensive Metabolic Panel  -     CBC & Differential  -     Lipid Panel  -     atorvastatin (LIPITOR) 40 MG tablet; Take 1 tablet by mouth Every Night.  Dispense: 90 tablet; Refill: 1    2. Acquired hypothyroidism  -     TSH  -     T4, Free  -     levothyroxine (Synthroid) 125 MCG tablet; Take 1 tablet by mouth Daily.  Dispense: 90 tablet; Refill: 1            Follow Up     Return in about 6 months (around 2/9/2023).  Follow-up in 6 months for labs and appt. Call with any concerns or questions that you may have regarding your medications or history.    I have reviewed all medications and at this time no  medications changes need to be adjusted for all chronic conditions.  Come in anytime for a BP check in the office.  Instructed how to take the BP  Patient was given instructions and counseling regarding her condition or for health maintenance advice. Please see specific information pulled into the AVS if appropriate.   She will let me know if she gets insurance and we will place order for mammo/cologuard.  Italia Beard  reports that she has been smoking cigarettes. She has a 60.00 pack-year smoking history. She has never used smokeless tobacco.. I have educated her on the risk of diseases from using tobacco products such as cancer, COPD and heart disease.     I advised her to quit and she is not willing to quit.         Cherelle Jones, APRN  08/09/2022

## 2022-10-04 ENCOUNTER — OFFICE VISIT (OUTPATIENT)
Dept: FAMILY MEDICINE CLINIC | Facility: CLINIC | Age: 62
End: 2022-10-04

## 2022-10-04 VITALS
HEART RATE: 102 BPM | SYSTOLIC BLOOD PRESSURE: 160 MMHG | OXYGEN SATURATION: 97 % | DIASTOLIC BLOOD PRESSURE: 90 MMHG | RESPIRATION RATE: 24 BRPM | TEMPERATURE: 97.7 F

## 2022-10-04 DIAGNOSIS — J44.1 CHRONIC OBSTRUCTIVE PULMONARY DISEASE WITH ACUTE EXACERBATION: Primary | ICD-10-CM

## 2022-10-04 DIAGNOSIS — R05.1 ACUTE COUGH: ICD-10-CM

## 2022-10-04 PROCEDURE — 99213 OFFICE O/P EST LOW 20 MIN: CPT | Performed by: NURSE PRACTITIONER

## 2022-10-04 PROCEDURE — 87428 SARSCOV & INF VIR A&B AG IA: CPT | Performed by: NURSE PRACTITIONER

## 2022-10-04 RX ORDER — PREDNISONE 20 MG/1
20 TABLET ORAL 2 TIMES DAILY
Qty: 10 TABLET | Refills: 0 | Status: SHIPPED | OUTPATIENT
Start: 2022-10-04 | End: 2022-10-09

## 2022-10-04 RX ORDER — AZITHROMYCIN 250 MG/1
TABLET, FILM COATED ORAL
Qty: 6 TABLET | Refills: 0 | Status: SHIPPED | OUTPATIENT
Start: 2022-10-04 | End: 2022-12-20

## 2022-10-04 NOTE — PROGRESS NOTES
Chief Complaint  Allergic Rhinitis and Nasal Congestion    Subjective          Italia Beard presents to [facility]  History of Present Illness  She is having congestion that started yesterday.  She is still smoking.  She usually gets an URI every year.  She has not had a fever but her grand kids have been sick.  She has not taken any OTC med.  She is worried with her pacemaker to take any meds.  She is coughing up mucus.    Her granddaughter has been sick with strep.    Allergies  Penicillins    Social History     Tobacco Use   • Smoking status: Current Every Day Smoker     Packs/day: 1.50     Years: 40.00     Pack years: 60.00     Types: Cigarettes   • Smokeless tobacco: Never Used   Vaping Use   • Vaping Use: Never used   Substance Use Topics   • Alcohol use: Never   • Drug use: Never       History reviewed. No pertinent family history.     Health Maintenance Due   Topic Date Due   • COLORECTAL CANCER SCREENING  Never done   • ANNUAL PHYSICAL  Never done   • Pneumococcal Vaccine 0-64 (1 - PCV) Never done   • ZOSTER VACCINE (1 of 2) Never done   • HEPATITIS C SCREENING  Never done   • LUNG CANCER SCREENING  01/05/2022   • INFLUENZA VACCINE  08/01/2022   • PAP SMEAR  09/24/2022        Immunization History   Administered Date(s) Administered   • Flu Vaccine Intradermal Quad 18-64YR 09/24/2020   • Hepatitis A 05/18/2018       Review of Systems   Constitutional: Negative for fever.   HENT: Positive for congestion, rhinorrhea and sore throat.    Respiratory: Positive for cough.         Objective       Vitals:    10/04/22 0823 10/04/22 0844   BP: 168/100 160/90   Pulse: 102    Resp: 24    Temp: 97.7 °F (36.5 °C)    SpO2: 97%        There is no height or weight on file to calculate BMI.         Physical Exam  Vitals reviewed.   Constitutional:       Appearance: Normal appearance. She is well-developed.   HENT:      Right Ear: Tympanic membrane and ear canal normal.      Left  "Ear: Tympanic membrane and ear canal normal.      Nose: Congestion and rhinorrhea present.      Mouth/Throat:      Pharynx: Posterior oropharyngeal erythema present. No oropharyngeal exudate.   Eyes:      Conjunctiva/sclera: Conjunctivae normal.   Cardiovascular:      Rate and Rhythm: Normal rate and regular rhythm.      Heart sounds: Normal heart sounds. No murmur heard.  Pulmonary:      Effort: Pulmonary effort is normal.      Breath sounds: Wheezing present. No rhonchi.   Neurological:      Mental Status: She is alert and oriented to person, place, and time.      Cranial Nerves: No cranial nerve deficit.      Motor: No weakness.   Psychiatric:         Mood and Affect: Mood and affect normal.             Result Review :     The following data was reviewed by: FABIAN Mendez on 10/04/2022:      Covid and flu neg in office               Assessment and Plan      Diagnoses and all orders for this visit:    1. Chronic obstructive pulmonary disease with acute exacerbation (HCC) (Primary)  -     azithromycin (Zithromax Z-Jesus) 250 MG tablet; Take 2 tablets by mouth on day 1, then 1 tablet daily on days 2-5  Dispense: 6 tablet; Refill: 0  -     predniSONE (DELTASONE) 20 MG tablet; Take 1 tablet by mouth 2 (Two) Times a Day for 5 days.  Dispense: 10 tablet; Refill: 0    2. Acute cough  -     POCT SARS-CoV-2 Antigen JANET + Flu            Follow Up     Return in about 4 months (around 2/4/2023) for for check up/refills as I saw her 8/9 for refills and labs..  We will treat with abx and steroids.  She can take claritin, zrytec or benadryl as needed. NO \"D\" med with her pacemaker.    Patient was given instructions and counseling regarding her condition or for health maintenance advice. Please see specific information pulled into the AVS if appropriate.   Drink plenty of fluids.        FABIAN Mendez  10/04/2022  "

## 2022-12-20 ENCOUNTER — OFFICE VISIT (OUTPATIENT)
Dept: FAMILY MEDICINE CLINIC | Facility: CLINIC | Age: 62
End: 2022-12-20

## 2022-12-20 VITALS
BODY MASS INDEX: 27.69 KG/M2 | WEIGHT: 162.2 LBS | SYSTOLIC BLOOD PRESSURE: 148 MMHG | OXYGEN SATURATION: 96 % | HEART RATE: 108 BPM | HEIGHT: 64 IN | TEMPERATURE: 99.1 F | DIASTOLIC BLOOD PRESSURE: 90 MMHG

## 2022-12-20 DIAGNOSIS — R50.9 FEVER, UNSPECIFIED FEVER CAUSE: ICD-10-CM

## 2022-12-20 DIAGNOSIS — B34.9 VIRAL ILLNESS: Primary | ICD-10-CM

## 2022-12-20 DIAGNOSIS — R05.1 ACUTE COUGH: ICD-10-CM

## 2022-12-20 PROCEDURE — 99213 OFFICE O/P EST LOW 20 MIN: CPT | Performed by: NURSE PRACTITIONER

## 2022-12-20 PROCEDURE — 87428 SARSCOV & INF VIR A&B AG IA: CPT | Performed by: NURSE PRACTITIONER

## 2022-12-20 RX ORDER — PREDNISONE 20 MG/1
TABLET ORAL
Qty: 18 TABLET | Refills: 0 | Status: SHIPPED | OUTPATIENT
Start: 2022-12-20 | End: 2023-02-07

## 2022-12-20 NOTE — PROGRESS NOTES
"Chief Complaint  Vomiting, Nasal Congestion, Cough, and Fever    Subjective          Italia Beard presents to [facility]  History of Present Illness  She ate some bean and mg soup on Sunday and she felt it tasted different and she started to belching about 230am and she started vomiting and then she started with congestion, water eyes, cough.  Low grade fever.  She has not had any diarrhea.  She has been sneezing.  No other vomiting beside the first time.  She is still smoking.  She is not coughing much up but it is trying to come up.  She will cough hard but nothing will come up.       Allergies  Penicillins    Social History     Tobacco Use   • Smoking status: Every Day     Packs/day: 1.50     Years: 40.00     Pack years: 60.00     Types: Cigarettes   • Smokeless tobacco: Never   Vaping Use   • Vaping Use: Never used   Substance Use Topics   • Alcohol use: Never   • Drug use: Never       No family history on file.     Health Maintenance Due   Topic Date Due   • COLORECTAL CANCER SCREENING  Never done   • HEPATITIS C SCREENING  Never done   • ANNUAL PHYSICAL  Never done   • LUNG CANCER SCREENING  01/05/2022   • PAP SMEAR  09/24/2022        Immunization History   Administered Date(s) Administered   • Flu Vaccine Intradermal Quad 18-64YR 09/24/2020   • Hepatitis A 05/18/2018       Review of Systems   Constitutional: Positive for chills and fever. Negative for fatigue.   HENT: Positive for congestion, ear pain, sinus pressure and sore throat.    Respiratory: Positive for cough. Negative for shortness of breath.    Cardiovascular: Negative for chest pain.   Gastrointestinal: Positive for vomiting. Negative for diarrhea and nausea.        Objective       Vitals:    12/20/22 0815   BP: 148/90   Pulse: 108   Temp: 99.1 °F (37.3 °C)   SpO2: 96%   Weight: 73.6 kg (162 lb 3.2 oz)   Height: 162.6 cm (64\")       Body mass index is 27.84 kg/m².         Physical Exam  Vitals " reviewed.   Constitutional:       Appearance: Normal appearance. She is well-developed.   HENT:      Right Ear: Tympanic membrane, ear canal and external ear normal.      Left Ear: Tympanic membrane, ear canal and external ear normal.      Nose: Congestion and rhinorrhea present.      Mouth/Throat:      Pharynx: Posterior oropharyngeal erythema present. No oropharyngeal exudate.   Eyes:      Conjunctiva/sclera: Conjunctivae normal.   Cardiovascular:      Rate and Rhythm: Normal rate and regular rhythm.      Heart sounds: Normal heart sounds. No murmur heard.  Pulmonary:      Effort: Pulmonary effort is normal.      Breath sounds: Normal breath sounds.   Neurological:      Mental Status: She is alert and oriented to person, place, and time.      Cranial Nerves: No cranial nerve deficit.      Motor: No weakness.   Psychiatric:         Mood and Affect: Mood and affect normal.             Result Review :     The following data was reviewed by: FABIAN Mendez on 12/20/2022:          SARS Antigen   Date Value Ref Range Status   12/20/2022 Not Detected Not Detected, Presumptive Negative Final     Influenza A Antigen JANET   Date Value Ref Range Status   12/20/2022 Not Detected Not Detected Final     Influenza B Antigen JANET   Date Value Ref Range Status   12/20/2022 Not Detected Not Detected Final                 Assessment and Plan      Diagnoses and all orders for this visit:    1. Viral illness (Primary)  -     predniSONE (DELTASONE) 20 MG tablet; 1 tab tid for 3 days then 1 tab bid for 3 days then daily for 3 days  Dispense: 18 tablet; Refill: 0    2. Acute cough  -     POCT SARS-CoV-2 Antigen JANET + Flu  -     predniSONE (DELTASONE) 20 MG tablet; 1 tab tid for 3 days then 1 tab bid for 3 days then daily for 3 days  Dispense: 18 tablet; Refill: 0    3. Fever, unspecified fever cause  -     POCT SARS-CoV-2 Antigen JANET + Flu  -     predniSONE (DELTASONE) 20 MG tablet; 1 tab tid for 3 days then 1 tab bid for 3  days then daily for 3 days  Dispense: 18 tablet; Refill: 0            Follow Up     Return if symptoms worsen or fail to improve.  We will do a round of steroids.  If not feeling better by Thursday we will do some antibiotics with her history of COPD and smoking.  Discussed that she more likely has a little viral illness with the stomach bug and then leading into congestion.  Call with questions or concerns.  Drink plenty of fluids.  Tylenol or ibuprofen as needed for fever or body aches.  She may do Mucinex plain over-the-counter to help with the congestion in the chest.  Her lungs sound very clear.  Patient was given instructions and counseling regarding her condition or for health maintenance advice. Please see specific information pulled into the AVS if appropriate.         Cherelle Jones, APRN  12/20/2022

## 2022-12-29 ENCOUNTER — TELEPHONE (OUTPATIENT)
Dept: FAMILY MEDICINE CLINIC | Facility: CLINIC | Age: 62
End: 2022-12-29

## 2022-12-29 NOTE — TELEPHONE ENCOUNTER
Caller: Italia Beard    Relationship: Self    What medication are you requesting: ANTIBIOTIC    What are your current symptoms: SNEEZING, RUNNY NOSE, COUGH        Have you had these symptoms before:    [x] Yes  [] No    Have you been treated for these symptoms before:   [x] Yes  [] No    If a prescription is needed, what is your preferred pharmacy and phone number: [facility] [address] [place], [place] - [address] [facility] [phone] [facility] [phone]

## 2022-12-30 RX ORDER — DOXYCYCLINE HYCLATE 100 MG/1
100 CAPSULE ORAL 2 TIMES DAILY
Qty: 20 CAPSULE | Refills: 0 | Status: SHIPPED | OUTPATIENT
Start: 2022-12-30 | End: 2023-02-07

## 2023-02-07 ENCOUNTER — OFFICE VISIT (OUTPATIENT)
Dept: FAMILY MEDICINE CLINIC | Facility: CLINIC | Age: 63
End: 2023-02-07

## 2023-02-07 VITALS
OXYGEN SATURATION: 94 % | TEMPERATURE: 97.5 F | RESPIRATION RATE: 18 BRPM | SYSTOLIC BLOOD PRESSURE: 144 MMHG | DIASTOLIC BLOOD PRESSURE: 100 MMHG | HEIGHT: 64 IN | HEART RATE: 103 BPM | BODY MASS INDEX: 27.83 KG/M2 | WEIGHT: 163 LBS

## 2023-02-07 DIAGNOSIS — E03.9 ACQUIRED HYPOTHYROIDISM: ICD-10-CM

## 2023-02-07 DIAGNOSIS — G43.819 OTHER MIGRAINE WITHOUT STATUS MIGRAINOSUS, INTRACTABLE: ICD-10-CM

## 2023-02-07 DIAGNOSIS — E78.2 MIXED HYPERLIPIDEMIA: Primary | ICD-10-CM

## 2023-02-07 DIAGNOSIS — Z12.11 SCREENING FOR COLON CANCER: ICD-10-CM

## 2023-02-07 DIAGNOSIS — Z11.59 ENCOUNTER FOR HEPATITIS C SCREENING TEST FOR LOW RISK PATIENT: ICD-10-CM

## 2023-02-07 DIAGNOSIS — R19.5 POSITIVE COLORECTAL CANCER SCREENING USING COLOGUARD TEST: ICD-10-CM

## 2023-02-07 DIAGNOSIS — I10 PRIMARY HYPERTENSION: ICD-10-CM

## 2023-02-07 DIAGNOSIS — J44.9 CHRONIC OBSTRUCTIVE PULMONARY DISEASE, UNSPECIFIED COPD TYPE: ICD-10-CM

## 2023-02-07 DIAGNOSIS — R21 RASH: ICD-10-CM

## 2023-02-07 LAB
ALBUMIN SERPL-MCNC: 4.4 G/DL (ref 3.5–5.2)
ALBUMIN/GLOB SERPL: 2 G/DL
ALP SERPL-CCNC: 119 U/L (ref 39–117)
ALT SERPL W P-5'-P-CCNC: 14 U/L (ref 1–33)
ANION GAP SERPL CALCULATED.3IONS-SCNC: 7 MMOL/L (ref 5–15)
AST SERPL-CCNC: 16 U/L (ref 1–32)
BASOPHILS # BLD AUTO: 0.06 10*3/MM3 (ref 0–0.2)
BASOPHILS NFR BLD AUTO: 1.2 % (ref 0–1.5)
BILIRUB SERPL-MCNC: 0.4 MG/DL (ref 0–1.2)
BUN SERPL-MCNC: 11 MG/DL (ref 8–23)
BUN/CREAT SERPL: 10.9 (ref 7–25)
CALCIUM SPEC-SCNC: 9.8 MG/DL (ref 8.6–10.5)
CHLORIDE SERPL-SCNC: 104 MMOL/L (ref 98–107)
CHOLEST SERPL-MCNC: 131 MG/DL (ref 0–200)
CO2 SERPL-SCNC: 30 MMOL/L (ref 22–29)
CREAT SERPL-MCNC: 1.01 MG/DL (ref 0.57–1)
DEPRECATED RDW RBC AUTO: 39.2 FL (ref 37–54)
EGFRCR SERPLBLD CKD-EPI 2021: 63.5 ML/MIN/1.73
EOSINOPHIL # BLD AUTO: 0.09 10*3/MM3 (ref 0–0.4)
EOSINOPHIL NFR BLD AUTO: 1.9 % (ref 0.3–6.2)
ERYTHROCYTE [DISTWIDTH] IN BLOOD BY AUTOMATED COUNT: 12.4 % (ref 12.3–15.4)
GLOBULIN UR ELPH-MCNC: 2.2 GM/DL
GLUCOSE SERPL-MCNC: 93 MG/DL (ref 65–99)
HCT VFR BLD AUTO: 45.4 % (ref 34–46.6)
HCV AB SER DONR QL: NORMAL
HDLC SERPL-MCNC: 71 MG/DL (ref 40–60)
HGB BLD-MCNC: 14.7 G/DL (ref 12–15.9)
IMM GRANULOCYTES # BLD AUTO: 0.02 10*3/MM3 (ref 0–0.05)
IMM GRANULOCYTES NFR BLD AUTO: 0.4 % (ref 0–0.5)
LDLC SERPL CALC-MCNC: 42 MG/DL (ref 0–100)
LDLC/HDLC SERPL: 0.57 {RATIO}
LYMPHOCYTES # BLD AUTO: 0.94 10*3/MM3 (ref 0.7–3.1)
LYMPHOCYTES NFR BLD AUTO: 19.5 % (ref 19.6–45.3)
MCH RBC QN AUTO: 28.4 PG (ref 26.6–33)
MCHC RBC AUTO-ENTMCNC: 32.4 G/DL (ref 31.5–35.7)
MCV RBC AUTO: 87.8 FL (ref 79–97)
MONOCYTES # BLD AUTO: 0.59 10*3/MM3 (ref 0.1–0.9)
MONOCYTES NFR BLD AUTO: 12.2 % (ref 5–12)
NEUTROPHILS NFR BLD AUTO: 3.13 10*3/MM3 (ref 1.7–7)
NEUTROPHILS NFR BLD AUTO: 64.8 % (ref 42.7–76)
NRBC BLD AUTO-RTO: 0 /100 WBC (ref 0–0.2)
PLATELET # BLD AUTO: 178 10*3/MM3 (ref 140–450)
PMV BLD AUTO: 11.7 FL (ref 6–12)
POTASSIUM SERPL-SCNC: 3.9 MMOL/L (ref 3.5–5.2)
PROT SERPL-MCNC: 6.6 G/DL (ref 6–8.5)
RBC # BLD AUTO: 5.17 10*6/MM3 (ref 3.77–5.28)
SODIUM SERPL-SCNC: 141 MMOL/L (ref 136–145)
T4 FREE SERPL-MCNC: 1.62 NG/DL (ref 0.93–1.7)
TRIGL SERPL-MCNC: 99 MG/DL (ref 0–150)
TSH SERPL DL<=0.05 MIU/L-ACNC: 0.37 UIU/ML (ref 0.27–4.2)
VLDLC SERPL-MCNC: 18 MG/DL (ref 5–40)
WBC NRBC COR # BLD: 4.83 10*3/MM3 (ref 3.4–10.8)

## 2023-02-07 PROCEDURE — 86803 HEPATITIS C AB TEST: CPT | Performed by: NURSE PRACTITIONER

## 2023-02-07 PROCEDURE — 84439 ASSAY OF FREE THYROXINE: CPT | Performed by: NURSE PRACTITIONER

## 2023-02-07 PROCEDURE — 99214 OFFICE O/P EST MOD 30 MIN: CPT | Performed by: NURSE PRACTITIONER

## 2023-02-07 PROCEDURE — 36415 COLL VENOUS BLD VENIPUNCTURE: CPT | Performed by: NURSE PRACTITIONER

## 2023-02-07 PROCEDURE — 85025 COMPLETE CBC W/AUTO DIFF WBC: CPT | Performed by: NURSE PRACTITIONER

## 2023-02-07 PROCEDURE — 80053 COMPREHEN METABOLIC PANEL: CPT | Performed by: NURSE PRACTITIONER

## 2023-02-07 PROCEDURE — 84443 ASSAY THYROID STIM HORMONE: CPT | Performed by: NURSE PRACTITIONER

## 2023-02-07 PROCEDURE — 80061 LIPID PANEL: CPT | Performed by: NURSE PRACTITIONER

## 2023-02-07 RX ORDER — LEVOTHYROXINE SODIUM 0.12 MG/1
125 TABLET ORAL DAILY
Qty: 90 TABLET | Refills: 1 | Status: SHIPPED | OUTPATIENT
Start: 2023-02-07

## 2023-02-07 RX ORDER — METOPROLOL SUCCINATE 25 MG/1
25 TABLET, EXTENDED RELEASE ORAL DAILY
Qty: 90 TABLET | Refills: 1 | Status: SHIPPED | OUTPATIENT
Start: 2023-02-07

## 2023-02-07 RX ORDER — SUMATRIPTAN 100 MG/1
100 TABLET, FILM COATED ORAL AS NEEDED
Qty: 9 TABLET | Refills: 5 | Status: SHIPPED | OUTPATIENT
Start: 2023-02-07

## 2023-02-07 RX ORDER — ALBUTEROL SULFATE 90 UG/1
2 AEROSOL, METERED RESPIRATORY (INHALATION) EVERY 4 HOURS PRN
Qty: 18 G | Refills: 1 | Status: SHIPPED | OUTPATIENT
Start: 2023-02-07

## 2023-02-07 RX ORDER — ATORVASTATIN CALCIUM 40 MG/1
40 TABLET, FILM COATED ORAL NIGHTLY
Qty: 90 TABLET | Refills: 1 | Status: SHIPPED | OUTPATIENT
Start: 2023-02-07

## 2023-02-07 NOTE — PROGRESS NOTES
Chief Complaint  Hypothyroidism and Hyperlipidemia    Subjective          Italia Beard presents to [facility]  History of Present Illness  She is fasting for labs today.  She has been taking her Synthroid daily with no issues noted.  LIPID:  She is taking her Lipitor daily.  NO leg issues.  She is not having any chest pain or shortness of breath noted.  She does currently smoke and is not ready to stop smoking. She would like an inhaler.    Migraine:  She has been out of her imitrex.  She has had 2 in the last couple months and needed the med but didn't have it.  She will take advil and that does calm the headache.    She has a pacemaker and does see cardiology 4/2023  There is a rash under the left breast that comes and goes.  She sleeps on that side. Never on the right side.  She has been putting some OTC abx cream and it does itch at times.    She is wanting an inhaler.    Allergies  Penicillins    Social History     Tobacco Use   • Smoking status: Every Day     Packs/day: 1.50     Years: 40.00     Pack years: 60.00     Types: Cigarettes   • Smokeless tobacco: Never   Vaping Use   • Vaping Use: Never used   Substance Use Topics   • Alcohol use: Never   • Drug use: Never       History reviewed. No pertinent family history.     Health Maintenance Due   Topic Date Due   • ANNUAL PHYSICAL  Never done        Immunization History   Administered Date(s) Administered   • Flu Vaccine Intradermal Quad 18-64YR 09/24/2020   • Hepatitis A 05/18/2018       Review of Systems   Constitutional: Negative for fatigue.   Respiratory: Positive for cough. Negative for shortness of breath.    Cardiovascular: Negative for chest pain.   Gastrointestinal: Negative for diarrhea, nausea and vomiting.        Objective       Vitals:    02/07/23 0758 02/07/23 0807   BP: 150/91 144/100   Pulse: 103    Resp: 18    Temp: 97.5 °F (36.4 °C)    SpO2: 94%    Weight: 73.9 kg (163 lb)    Height: 162.6 cm  "(64\")        Body mass index is 27.98 kg/m².         Physical Exam  Vitals reviewed.   Constitutional:       Appearance: Normal appearance. She is well-developed.   Cardiovascular:      Rate and Rhythm: Normal rate and regular rhythm.      Heart sounds: Normal heart sounds. No murmur heard.  Pulmonary:      Effort: Pulmonary effort is normal.      Breath sounds: Normal breath sounds.   Skin:     Comments: Under the left breast there is a well rounded rash that is slt raised at the edges.  No oozing.   Neurological:      Mental Status: She is alert and oriented to person, place, and time.      Cranial Nerves: No cranial nerve deficit.      Motor: No weakness.   Psychiatric:         Mood and Affect: Mood and affect normal.             Result Review :     The following data was reviewed by: FABIAN Mendez on 02/07/2023:    Common labs    Common Labs 4/19/22 4/19/22 4/19/22 8/9/22 8/9/22 8/9/22    0836 0836 0836 1451 1451 1451   Glucose 79    86    BUN 10    9    Creatinine 0.95    1.11 (A)    Sodium 145    141    Potassium 4.5    4.7    Chloride 105    102    Calcium 9.8    10.1    Albumin 3.90    4.30    Total Bilirubin 0.2    0.5    Alkaline Phosphatase 104    126 (A)    AST (SGOT) 12    17    ALT (SGPT) 12    11    WBC   7.51 7.53     Hemoglobin   14.0 14.9     Hematocrit   42.1 44.6     Platelets   223 183     Total Cholesterol  167    124   Triglycerides  100    90   HDL Cholesterol  64 (A)    70 (A)   LDL Cholesterol   85    37   (A) Abnormal value                               Assessment and Plan      Diagnoses and all orders for this visit:    1. Mixed hyperlipidemia (Primary)  -     atorvastatin (LIPITOR) 40 MG tablet; Take 1 tablet by mouth Every Night.  Dispense: 90 tablet; Refill: 1  -     Comprehensive Metabolic Panel  -     CBC & Differential  -     Lipid Panel    2. Acquired hypothyroidism  -     levothyroxine (Synthroid) 125 MCG tablet; Take 1 tablet by mouth Daily.  Dispense: 90 tablet; " Refill: 1  -     TSH  -     T4, Free    3. Screening for colon cancer  -     Cologuard - Stool, Per Rectum; Future    4. Encounter for hepatitis C screening test for low risk patient  -     Hepatitis C antibody    5. Other migraine without status migrainosus, intractable  -     SUMAtriptan (IMITREX) 100 MG tablet; Take 1 tablet by mouth As Needed for Migraine.  Dispense: 9 tablet; Refill: 5    6. Primary hypertension  -     Comprehensive Metabolic Panel  -     CBC & Differential  -     metoprolol succinate XL (Toprol XL) 25 MG 24 hr tablet; Take 1 tablet by mouth Daily.  Dispense: 90 tablet; Refill: 1    7. Chronic obstructive pulmonary disease, unspecified COPD type (HCC)  -     albuterol sulfate  (90 Base) MCG/ACT inhaler; Inhale 2 puffs Every 4 (Four) Hours As Needed for Wheezing.  Dispense: 18 g; Refill: 1    8. Rash            Follow Up     Return in about 6 months (around 8/7/2023).  Follow-up in 6 months for labs and appt. Call with any concerns or questions that you may have regarding your medications or history.    I did add a low dose BP med to help control the BP.  She will f.u with cardiology and see how the BP is doing at that time and go from there.  Try the goldbond medicated powder for 1-2 weeks and if not better let me know and we will do some steroid/antifung cream.   Patient was given instructions and counseling regarding her condition or for health maintenance advice. Please see specific information pulled into the AVS if appropriate.   She is aware to rinse mouth out after each use of the inhaler.    Parts of this note are electronic transcriptions/translations of spoken language to printed text using the Dragon Dictation system.      Italia DURANT Kisha  reports that she has been smoking cigarettes. She has a 60.00 pack-year smoking history. She has never used smokeless tobacco.. I have educated her on the risk of diseases from using tobacco products such as cancer, COPD and heart  disease.     I advised her to quit and she is not willing to quit.         Cherelle Jones, APRN  02/07/2023

## 2023-03-07 ENCOUNTER — PREP FOR SURGERY (OUTPATIENT)
Dept: OTHER | Facility: HOSPITAL | Age: 63
End: 2023-03-07

## 2023-03-07 ENCOUNTER — OFFICE VISIT (OUTPATIENT)
Dept: SURGERY | Facility: CLINIC | Age: 63
End: 2023-03-07

## 2023-03-07 VITALS — HEIGHT: 64 IN | WEIGHT: 163 LBS | BODY MASS INDEX: 27.83 KG/M2 | RESPIRATION RATE: 16 BRPM

## 2023-03-07 DIAGNOSIS — Z12.11 COLON CANCER SCREENING: Primary | ICD-10-CM

## 2023-03-07 PROCEDURE — S0260 H&P FOR SURGERY: HCPCS | Performed by: SURGERY

## 2023-03-07 NOTE — PROGRESS NOTES
Inpatient History and Physical Surgical Orders    Preadmission Location:   Preadmission Time:  Facility:  Surgery Date:  Surgery Time:  Preadmission Test date:     Chief Complaint  Outpatient History and Physical / Surgical Orders    Primary Care Provider: Cherelle Jones APRN    Referring Provider: Cherelle Jones, *    Subjective      Patient Name: Italia Beard : 1960    HPI  The patient is a 62-year-old female who presents for colon cancer screening.  She recently had a Cologuard test that came back positive.    Past History:  Medical History: has a past medical history of Anemia, B12 deficiency, COPD (chronic obstructive pulmonary disease) (HCC), Heart murmur, Hyperlipidemia, Hypertension, Hypothyroidism, Migraines, and Positive colorectal cancer screening using Cologuard test.   Surgical History: has a past surgical history that includes Tubal ligation; Gallbladder surgery; and Pacemaker Implantation.   Family History: family history is not on file.   Social History: reports that she has been smoking cigarettes. She has a 60.00 pack-year smoking history. She has never used smokeless tobacco. She reports that she does not drink alcohol and does not use drugs.  Allergies: Penicillins       Current Outpatient Medications:   •  albuterol sulfate  (90 Base) MCG/ACT inhaler, Inhale 2 puffs Every 4 (Four) Hours As Needed for Wheezing., Disp: 18 g, Rfl: 1  •  atorvastatin (LIPITOR) 40 MG tablet, Take 1 tablet by mouth Every Night., Disp: 90 tablet, Rfl: 1  •  cyanocobalamin (VITAMIN B-12) 1000 MCG tablet, Take 1 tablet by mouth Daily., Disp: , Rfl:   •  ferrous sulfate 325 (65 FE) MG tablet, Take 1 tablet by mouth Daily With Breakfast., Disp: , Rfl:   •  levothyroxine (Synthroid) 125 MCG tablet, Take 1 tablet by mouth Daily., Disp: 90 tablet, Rfl: 1  •  metoprolol succinate XL (Toprol XL) 25 MG 24 hr tablet, Take 1 tablet by mouth Daily., Disp: 90 tablet, Rfl: 1  •  Nutritional  "Supplements (MENOPAUSE FORMULA PO), Take  by mouth., Disp: , Rfl:   •  SUMAtriptan (IMITREX) 100 MG tablet, Take 1 tablet by mouth As Needed for Migraine., Disp: 9 tablet, Rfl: 5       Objective   Vital Signs:   Resp 16   Ht 162.6 cm (64.02\")   Wt 73.9 kg (163 lb)   BMI 27.96 kg/m²       Physical Exam  Vitals and nursing note reviewed.   Constitutional:       Appearance: Normal appearance. The patient is well-developed.   Cardiovascular:      Rate and Rhythm: Normal rate and regular rhythm.   Pulmonary:      Effort: Pulmonary effort is normal.      Breath sounds: Normal air entry.   Abdominal:      General: Bowel sounds are normal.      Palpations: Abdomen is soft.      Skin:     General: Skin is warm and dry.   Neurological:      Mental Status: The patient is alert and oriented to person, place, and time.      Motor: Motor function is intact.   Psychiatric:         Mood and Affect: Mood normal.       Result Review :               Assessment and Plan   Diagnoses and all orders for this visit:    1. Colon cancer screening (Primary)    We will schedule her for a colonoscopy.  I have described the procedure to her as well as the risk and benefits and she is agreeable to proceeding.    I  Chan Chaparro MD  03/07/2023    "

## 2023-03-15 RX ORDER — ASPIRIN 81 MG/1
81 TABLET, CHEWABLE ORAL NIGHTLY
COMMUNITY

## 2023-03-20 ENCOUNTER — ANESTHESIA (OUTPATIENT)
Dept: GASTROENTEROLOGY | Facility: HOSPITAL | Age: 63
End: 2023-03-20

## 2023-03-20 ENCOUNTER — ANESTHESIA EVENT (OUTPATIENT)
Dept: GASTROENTEROLOGY | Facility: HOSPITAL | Age: 63
End: 2023-03-20

## 2023-03-20 ENCOUNTER — HOSPITAL ENCOUNTER (OUTPATIENT)
Facility: HOSPITAL | Age: 63
Setting detail: HOSPITAL OUTPATIENT SURGERY
Discharge: HOME | End: 2023-03-20
Attending: SURGERY | Admitting: SURGERY

## 2023-03-20 VITALS
TEMPERATURE: 97.5 F | DIASTOLIC BLOOD PRESSURE: 83 MMHG | HEART RATE: 91 BPM | OXYGEN SATURATION: 98 % | SYSTOLIC BLOOD PRESSURE: 135 MMHG | RESPIRATION RATE: 17 BRPM | BODY MASS INDEX: 27.23 KG/M2 | WEIGHT: 158.73 LBS

## 2023-03-20 DIAGNOSIS — Z12.11 COLON CANCER SCREENING: ICD-10-CM

## 2023-03-20 PROCEDURE — 88305 TISSUE EXAM BY PATHOLOGIST: CPT | Performed by: SURGERY

## 2023-03-20 PROCEDURE — 25010000002 PROPOFOL 10 MG/ML EMULSION: Performed by: NURSE ANESTHETIST, CERTIFIED REGISTERED

## 2023-03-20 RX ORDER — PROPOFOL 10 MG/ML
VIAL (ML) INTRAVENOUS AS NEEDED
Status: DISCONTINUED | OUTPATIENT
Start: 2023-03-20 | End: 2023-03-20 | Stop reason: SURG

## 2023-03-20 RX ORDER — SODIUM CHLORIDE, SODIUM LACTATE, POTASSIUM CHLORIDE, CALCIUM CHLORIDE 600; 310; 30; 20 MG/100ML; MG/100ML; MG/100ML; MG/100ML
INJECTION, SOLUTION INTRAVENOUS CONTINUOUS PRN
Status: DISCONTINUED | OUTPATIENT
Start: 2023-03-20 | End: 2023-03-20 | Stop reason: SURG

## 2023-03-20 RX ORDER — ONDANSETRON 4 MG/1
4 TABLET, FILM COATED ORAL ONCE AS NEEDED
Status: DISCONTINUED | OUTPATIENT
Start: 2023-03-20 | End: 2023-03-20 | Stop reason: HOSPADM

## 2023-03-20 RX ORDER — ONDANSETRON 2 MG/ML
4 INJECTION INTRAMUSCULAR; INTRAVENOUS ONCE AS NEEDED
Status: DISCONTINUED | OUTPATIENT
Start: 2023-03-20 | End: 2023-03-20 | Stop reason: HOSPADM

## 2023-03-20 RX ORDER — LIDOCAINE HYDROCHLORIDE 20 MG/ML
INJECTION, SOLUTION EPIDURAL; INFILTRATION; INTRACAUDAL; PERINEURAL AS NEEDED
Status: DISCONTINUED | OUTPATIENT
Start: 2023-03-20 | End: 2023-03-20 | Stop reason: SURG

## 2023-03-20 RX ORDER — SODIUM CHLORIDE, SODIUM LACTATE, POTASSIUM CHLORIDE, CALCIUM CHLORIDE 600; 310; 30; 20 MG/100ML; MG/100ML; MG/100ML; MG/100ML
30 INJECTION, SOLUTION INTRAVENOUS CONTINUOUS
Status: DISCONTINUED | OUTPATIENT
Start: 2023-03-20 | End: 2023-03-20 | Stop reason: HOSPADM

## 2023-03-20 RX ADMIN — SODIUM CHLORIDE, POTASSIUM CHLORIDE, SODIUM LACTATE AND CALCIUM CHLORIDE: 600; 310; 30; 20 INJECTION, SOLUTION INTRAVENOUS at 13:39

## 2023-03-20 RX ADMIN — LIDOCAINE HYDROCHLORIDE 100 MG: 20 INJECTION, SOLUTION EPIDURAL; INFILTRATION; INTRACAUDAL; PERINEURAL at 13:52

## 2023-03-20 RX ADMIN — PROPOFOL 100 MG: 10 INJECTION, EMULSION INTRAVENOUS at 13:59

## 2023-03-20 RX ADMIN — SODIUM CHLORIDE, POTASSIUM CHLORIDE, SODIUM LACTATE AND CALCIUM CHLORIDE 30 ML/HR: 600; 310; 30; 20 INJECTION, SOLUTION INTRAVENOUS at 12:32

## 2023-03-20 RX ADMIN — PROPOFOL 150 MCG/KG/MIN: 10 INJECTION, EMULSION INTRAVENOUS at 13:52

## 2023-03-20 RX ADMIN — PROPOFOL 100 MG: 10 INJECTION, EMULSION INTRAVENOUS at 13:52

## 2023-03-20 NOTE — ANESTHESIA PREPROCEDURE EVALUATION
" Anesthesia Evaluation     Patient summary reviewed and Nursing notes reviewed   no history of anesthetic complications:  NPO Solid Status: > 8 hours  NPO Liquid Status: > 2 hours           Airway   Mallampati: I  TM distance: >3 FB  Neck ROM: full  No difficulty expected  Dental    (+) upper dentures and lower dentures    Pulmonary - normal exam    breath sounds clear to auscultation  (+) a smoker Current, COPD,   Cardiovascular   Exercise tolerance: good (4-7 METS)    Rhythm: regular  Rate: normal    (+) pacemaker pacemaker, hypertension, valvular problems/murmurs murmur, murmur, hyperlipidemia,     ROS comment: Cardiology \"Italia has AV block, it is stable with the dual-chamber permanent pacemaker.  The device is functioning normally.  Continue remote monitoring\"    PE comment: tachy    Neuro/Psych- negative ROS  GI/Hepatic/Renal/Endo    (+)   thyroid problem hypothyroidism    Musculoskeletal     Abdominal    Substance History      OB/GYN          Other        ROS/Med Hx Other: PAT Nursing Notes unavailable.           CONCLUSIONS:  ECHO 2021  1.  Normal left ventricular systolic function with an estimated ejection        fraction of 55-60%.  No regional wall motion abnormalities were observed.    2.  Grade 1 diastolic dysfunction with indeterminate left ventricular filling        pressures.    3.  Mild mitral regurgitation.    4.  Mild aortic regurgitation.            Anesthesia Plan    ASA 3     general   total IV anesthesia    Anesthetic plan, risks, benefits, and alternatives have been provided, discussed and informed consent has been obtained with: patient.        CODE STATUS:       "

## 2023-03-20 NOTE — ANESTHESIA POSTPROCEDURE EVALUATION
Patient: Italia Beard    Procedure Summary     Date: 03/20/23 Room / Location: [facility] ENDOSCOPY 4 / [facility] ENDOSCOPY    Anesthesia Start: 1350 Anesthesia Stop: 1413    Procedure: COLONOSCOPY with forcep polypectomy Diagnosis:       Colon cancer screening      (Colon cancer screening [Z12.11])    Surgeons: Chan Chaparro MD Provider: Sonny Santiago MD    Anesthesia Type: general ASA Status: 3          Anesthesia Type: general    Vitals  Vitals Value Taken Time   /89 03/20/23 1434   Temp 36.4 °C (97.5 °F) 03/20/23 1435   Pulse 77 03/20/23 1436   Resp 17 03/20/23 1435   SpO2 98 % 03/20/23 1436   Vitals shown include unvalidated device data.        Post Anesthesia Care and Evaluation    Patient location during evaluation: bedside  Patient participation: complete - patient participated  Level of consciousness: awake  Pain management: adequate    Airway patency: patent  Anesthetic complications: No anesthetic complications  PONV Status: none  Cardiovascular status: acceptable and stable  Respiratory status: acceptable  Hydration status: acceptable    Comments: An Anesthesiologist personally participated in the most demanding procedures (including induction and emergence if applicable) in the anesthesia plan, monitored the course of anesthesia administration at frequent intervals and remained physically present and available for immediate diagnosis and treatment of emergencies.

## 2023-03-22 LAB
CYTO UR: NORMAL
LAB AP CASE REPORT: NORMAL
LAB AP CLINICAL INFORMATION: NORMAL
PATH REPORT.FINAL DX SPEC: NORMAL
PATH REPORT.GROSS SPEC: NORMAL

## 2023-04-04 ENCOUNTER — OFFICE VISIT (OUTPATIENT)
Dept: SURGERY | Facility: CLINIC | Age: 63
End: 2023-04-04

## 2023-04-04 VITALS — BODY MASS INDEX: 27.14 KG/M2 | RESPIRATION RATE: 14 BRPM | WEIGHT: 159 LBS | HEIGHT: 64 IN

## 2023-04-04 DIAGNOSIS — Z12.11 COLON CANCER SCREENING: Primary | ICD-10-CM

## 2023-04-04 PROCEDURE — 99212 OFFICE O/P EST SF 10 MIN: CPT | Performed by: SURGERY

## 2023-04-04 NOTE — LETTER
April 4, 2023     FABIAN Mendez  [address] [place] [address]    Patient: Italia Beard   YOB: 1960   Date of Visit: 4/4/2023       Dear FABIAN Neff:    Thank you for referring Italia Beard to me for evaluation. Below are the relevant portions of my assessment and plan of care.    If you have questions, please do not hesitate to call me. I look forward to following Italia along with you.         Sincerely,        Chan Chaparro MD        CC: No Recipients    Chan Chaparro MD  04/04/23 1410  Signed  Chief Complaint  Colonoscopy and Post-op    Subjective           Italia Beard presents to [facility] GENERAL SURGERY  History of Present Illness    Italia Beard is a 62 y.o. female  who presents today for a postoperative visit.     Patient is here for a follow-up after a recent colonoscopy.  That study was done because of a positive Cologuard test and we did find a small rectal polyp but fortunately that came back consistent with a hyperplastic polyp.  She is doing fine today.    Past History:  Medical History: has a past medical history of Anemia, B12 deficiency, COPD (chronic obstructive pulmonary disease), Heart murmur, Hyperlipidemia, Hypertension, Hypothyroidism, Migraines, and Positive colorectal cancer screening using Cologuard test.   Surgical History: has a past surgical history that includes Tubal ligation; Pacemaker Implantation; Cholecystectomy; Appendectomy; and Colonoscopy (N/A, 3/20/2023).   Family History: family history is not on file.   Social History: reports that she has been smoking cigarettes. She has a 60.00 pack-year smoking history. She has never used smokeless tobacco. She reports that she does not drink alcohol and does not use drugs.  Allergies: Penicillins       Current Outpatient Medications:   •  albuterol sulfate  (90 Base) MCG/ACT inhaler, Inhale 2 puffs Every 4 (Four) Hours  "As Needed for Wheezing., Disp: 18 g, Rfl: 1  •  aspirin 81 MG chewable tablet, Chew 1 tablet Every Night., Disp: , Rfl:   •  atorvastatin (LIPITOR) 40 MG tablet, Take 1 tablet by mouth Every Night., Disp: 90 tablet, Rfl: 1  •  cyanocobalamin (VITAMIN B-12) 1000 MCG tablet, Take 1 tablet by mouth Daily., Disp: , Rfl:   •  ferrous sulfate 325 (65 FE) MG tablet, Take 1 tablet by mouth Daily With Breakfast., Disp: , Rfl:   •  levothyroxine (Synthroid) 125 MCG tablet, Take 1 tablet by mouth Daily., Disp: 90 tablet, Rfl: 1  •  metoprolol succinate XL (Toprol XL) 25 MG 24 hr tablet, Take 1 tablet by mouth Daily., Disp: 90 tablet, Rfl: 1  •  Nutritional Supplements (MENOPAUSE FORMULA PO), Take  by mouth., Disp: , Rfl:   •  SUMAtriptan (IMITREX) 100 MG tablet, Take 1 tablet by mouth As Needed for Migraine., Disp: 9 tablet, Rfl: 5       Physical Exam  She appears well and her abdomen is soft.  Objective      Vital Signs:   Resp 14   Ht 162.6 cm (64.02\")   Wt 72.1 kg (159 lb)   BMI 27.28 kg/m²             Assessment and Plan    Diagnoses and all orders for this visit:    1. Colon cancer screening (Primary)    I will see her back on an as-needed basis.  I have asked her to call me should she have any further problems.        "

## 2023-04-04 NOTE — PROGRESS NOTES
Chief Complaint  Colonoscopy and Post-op    Subjective          Italia Beard presents to [facility] GENERAL SURGERY  History of Present Illness    Italia Beard is a 62 y.o. female  who presents today for a postoperative visit.     Patient is here for a follow-up after a recent colonoscopy.  That study was done because of a positive Cologuard test and we did find a small rectal polyp but fortunately that came back consistent with a hyperplastic polyp.  She is doing fine today.    Past History:  Medical History: has a past medical history of Anemia, B12 deficiency, COPD (chronic obstructive pulmonary disease), Heart murmur, Hyperlipidemia, Hypertension, Hypothyroidism, Migraines, and Positive colorectal cancer screening using Cologuard test.   Surgical History: has a past surgical history that includes Tubal ligation; Pacemaker Implantation; Cholecystectomy; Appendectomy; and Colonoscopy (N/A, 3/20/2023).   Family History: family history is not on file.   Social History: reports that she has been smoking cigarettes. She has a 60.00 pack-year smoking history. She has never used smokeless tobacco. She reports that she does not drink alcohol and does not use drugs.  Allergies: Penicillins       Current Outpatient Medications:   •  albuterol sulfate  (90 Base) MCG/ACT inhaler, Inhale 2 puffs Every 4 (Four) Hours As Needed for Wheezing., Disp: 18 g, Rfl: 1  •  aspirin 81 MG chewable tablet, Chew 1 tablet Every Night., Disp: , Rfl:   •  atorvastatin (LIPITOR) 40 MG tablet, Take 1 tablet by mouth Every Night., Disp: 90 tablet, Rfl: 1  •  cyanocobalamin (VITAMIN B-12) 1000 MCG tablet, Take 1 tablet by mouth Daily., Disp: , Rfl:   •  ferrous sulfate 325 (65 FE) MG tablet, Take 1 tablet by mouth Daily With Breakfast., Disp: , Rfl:   •  levothyroxine (Synthroid) 125 MCG tablet, Take 1 tablet by mouth Daily., Disp: 90 tablet, Rfl: 1  •  metoprolol succinate XL (Toprol XL) 25 MG 24 hr  "tablet, Take 1 tablet by mouth Daily., Disp: 90 tablet, Rfl: 1  •  Nutritional Supplements (MENOPAUSE FORMULA PO), Take  by mouth., Disp: , Rfl:   •  SUMAtriptan (IMITREX) 100 MG tablet, Take 1 tablet by mouth As Needed for Migraine., Disp: 9 tablet, Rfl: 5       Physical Exam  She appears well and her abdomen is soft.  Objective     Vital Signs:   Resp 14   Ht 162.6 cm (64.02\")   Wt 72.1 kg (159 lb)   BMI 27.28 kg/m²              Assessment and Plan    Diagnoses and all orders for this visit:    1. Colon cancer screening (Primary)    I will see her back on an as-needed basis.  I have asked her to call me should she have any further problems.      "

## 2023-04-17 ENCOUNTER — OFFICE VISIT (OUTPATIENT)
Dept: CARDIOLOGY | Facility: CLINIC | Age: 63
End: 2023-04-17

## 2023-04-17 VITALS
DIASTOLIC BLOOD PRESSURE: 84 MMHG | HEART RATE: 74 BPM | HEIGHT: 64 IN | BODY MASS INDEX: 28.17 KG/M2 | SYSTOLIC BLOOD PRESSURE: 139 MMHG | WEIGHT: 165 LBS

## 2023-04-17 DIAGNOSIS — I44.1 AV BLOCK, MOBITZ II: Primary | ICD-10-CM

## 2023-04-17 DIAGNOSIS — I10 HYPERTENSION, ESSENTIAL: ICD-10-CM

## 2023-04-17 DIAGNOSIS — F17.200 SMOKER: ICD-10-CM

## 2023-04-17 DIAGNOSIS — Z95.0 PACEMAKER: ICD-10-CM

## 2023-04-17 PROCEDURE — 99214 OFFICE O/P EST MOD 30 MIN: CPT | Performed by: INTERNAL MEDICINE

## 2023-04-17 NOTE — PROGRESS NOTES
Chief Complaint  AV block, Mobitz II; pacemaker; Hypertension; and Hyperlipidemia    Subjective            Italia Beard presents to [facility] CARDIOLOGY    Italia is here for follow-up evaluation management of high-grade AV block, dual-chamber permanent pacemaker, essential hypertension, smoking.  Since last visit she is doing relatively well.  She denies chest pain.  She has stable chronic shortness of breath.  She continues to smoke and is currently unable to quit    PMH  Past Medical History:   Diagnosis Date   • Anemia    • B12 deficiency    • COPD (chronic obstructive pulmonary disease)    • Heart murmur    • Hyperlipidemia    • Hypertension    • Hypothyroidism    • Migraines    • Positive colorectal cancer screening using Cologuard test          SURGICALHX  Past Surgical History:   Procedure Laterality Date   • APPENDECTOMY     • CHOLECYSTECTOMY     • COLONOSCOPY N/A 3/20/2023    Procedure: COLONOSCOPY with forcep polypectomy;  Surgeon: Chan Chaparro MD;  Location: [facility] ENDOSCOPY;  Service: General;  Laterality: N/A;  colon polyp   • PACEMAKER IMPLANTATION     • TUBAL ABDOMINAL LIGATION          SOC  Social History     Socioeconomic History   • Marital status:    Tobacco Use   • Smoking status: Every Day     Packs/day: 1.50     Years: 40.00     Pack years: 60.00     Types: Cigarettes   • Smokeless tobacco: Never   Vaping Use   • Vaping Use: Never used   Substance and Sexual Activity   • Alcohol use: Never   • Drug use: Never   • Sexual activity: Defer         FAMHX  Family History   Problem Relation Age of Onset   • No Known Problems Mother    • Heart attack Father    • Malig Hyperthermia Neg Hx           ALLERGY  Allergies   Allergen Reactions   • Penicillins Hives        MEDSCURRENT    Current Outpatient Medications:   •  albuterol sulfate  (90 Base) MCG/ACT inhaler, Inhale 2 puffs Every 4 (Four) Hours As Needed for Wheezing., Disp: 18 g, Rfl: 1  •  aspirin  "81 MG chewable tablet, Chew 1 tablet Every Night., Disp: , Rfl:   •  atorvastatin (LIPITOR) 40 MG tablet, Take 1 tablet by mouth Every Night., Disp: 90 tablet, Rfl: 1  •  cyanocobalamin (VITAMIN B-12) 1000 MCG tablet, Take 1 tablet by mouth Daily., Disp: , Rfl:   •  ferrous sulfate 325 (65 FE) MG tablet, Take 1 tablet by mouth Daily With Breakfast., Disp: , Rfl:   •  levothyroxine (Synthroid) 125 MCG tablet, Take 1 tablet by mouth Daily., Disp: 90 tablet, Rfl: 1  •  metoprolol succinate XL (Toprol XL) 25 MG 24 hr tablet, Take 1 tablet by mouth Daily., Disp: 90 tablet, Rfl: 1  •  Nutritional Supplements (MENOPAUSE FORMULA PO), Take  by mouth., Disp: , Rfl:   •  SUMAtriptan (IMITREX) 100 MG tablet, Take 1 tablet by mouth As Needed for Migraine., Disp: 9 tablet, Rfl: 5      Review of Systems   Cardiovascular: Negative for chest pain.   Respiratory: Positive for cough and shortness of breath.         Objective     /84   Pulse 74   Ht 162.6 cm (64.02\")   Wt 74.8 kg (165 lb)   BMI 28.30 kg/m²       General Appearance:   · well developed  · well nourished  HENT:   · oropharynx moist  · lips not cyanotic  Neck:  · thyroid not enlarged  · supple  Respiratory:  · no respiratory distress  · normal breath sounds  · no rales  Cardiovascular:  · no jugular venous distention  · regular rhythm  · apical impulse normal  · S1 normal, S2 normal  · no S3, no S4   · no murmur  · no rub, no thrill  · carotid pulses normal; no bruit  · pedal pulses normal  · lower extremity edema: none    Musculoskeletal:  · no clubbing of fingers.   · normocephalic, head atraumatic  Skin:   · warm, dry  Psychiatric:  · judgement and insight appropriate  · normal mood and affect      Result Review :     The following data was reviewed by: Cain Artis MD on 04/20/2022:    CMP        8/9/2022    14:51 2/7/2023    08:22   CMP   Glucose 86   93     BUN 9   11     Creatinine 1.11   1.01     EGFR 57.0   63.5     Sodium 141   141   "   Potassium 4.7   3.9     Chloride 102   104     Calcium 10.1   9.8     Total Protein 7.0   6.6     Albumin 4.30   4.4     Globulin 2.7   2.2     Total Bilirubin 0.5   0.4     Alkaline Phosphatase 126   119     AST (SGOT) 17   16     ALT (SGPT) 11   14     Albumin/Globulin Ratio 1.6   2.0     BUN/Creatinine Ratio 8.1   10.9     Anion Gap 12.0   7.0       CBC        8/9/2022    14:51 2/7/2023    08:22   CBC   WBC 7.53   4.83     RBC 4.97   5.17     Hemoglobin 14.9   14.7     Hematocrit 44.6   45.4     MCV 89.7   87.8     MCH 30.0   28.4     MCHC 33.4   32.4     RDW 12.4   12.4     Platelets 183   178       Lipid Panel        8/9/2022    14:51 2/7/2023    08:22   Lipid Panel   Total Cholesterol 124   131     Triglycerides 90   99     HDL Cholesterol 70   71     VLDL Cholesterol 17   18     LDL Cholesterol  37   42     LDL/HDL Ratio 0.51   0.57       TSH        8/9/2022    14:51 2/7/2023    08:22   TSH   TSH 1.280   0.369         Data reviewed: Primary care records reviewed, right pacemaker remote monitoring reviewed, estimated battery life 5.5 years, 39% right ventricular pacing     Procedures      Italia Beard  reports that she has been smoking cigarettes. She has a 60.00 pack-year smoking history. She has never used smokeless tobacco.. I have educated her on the risk of diseases from using tobacco products such as cancer, COPD and heart disease.     I advised her to quit and she is not currently willing to quit.    I spent 3  minutes counseling the patient.                Assessment and Plan        ASSESSMENT:  Encounter Diagnoses   Name Primary?   • AV block, Mobitz II Yes   • Pacemaker    • Hypertension, essential    • Smoker          PLAN:    1.  Italia has AV block, it is stable with the dual-chamber permanent pacemaker.  The device is functioning normally.  Continue remote monitoring.  2.  Hypertension, stable, continue current medical therapy  3.  Smoking cessation counseling, currently not  interested in quitting.    Annual follow-up will be arranged otherwise, with a pacemaker check next visit          Patient was given instructions and counseling regarding her condition or for health maintenance advice. Please see specific information pulled into the AVS if appropriate.             HÉCTOR Artis MD  4/17/2023    11:22 EDT

## 2023-08-07 ENCOUNTER — OFFICE VISIT (OUTPATIENT)
Dept: FAMILY MEDICINE CLINIC | Facility: CLINIC | Age: 63
End: 2023-08-07

## 2023-08-07 VITALS
WEIGHT: 169.1 LBS | SYSTOLIC BLOOD PRESSURE: 160 MMHG | TEMPERATURE: 96.7 F | HEART RATE: 66 BPM | DIASTOLIC BLOOD PRESSURE: 90 MMHG | BODY MASS INDEX: 28.87 KG/M2 | HEIGHT: 64 IN | RESPIRATION RATE: 18 BRPM | OXYGEN SATURATION: 96 %

## 2023-08-07 DIAGNOSIS — G43.819 OTHER MIGRAINE WITHOUT STATUS MIGRAINOSUS, INTRACTABLE: ICD-10-CM

## 2023-08-07 DIAGNOSIS — Z12.31 SCREENING MAMMOGRAM FOR BREAST CANCER: ICD-10-CM

## 2023-08-07 DIAGNOSIS — E78.2 MIXED HYPERLIPIDEMIA: ICD-10-CM

## 2023-08-07 DIAGNOSIS — Z78.0 POSTMENOPAUSAL: ICD-10-CM

## 2023-08-07 DIAGNOSIS — J44.9 CHRONIC OBSTRUCTIVE PULMONARY DISEASE, UNSPECIFIED COPD TYPE: ICD-10-CM

## 2023-08-07 DIAGNOSIS — I10 PRIMARY HYPERTENSION: Primary | ICD-10-CM

## 2023-08-07 DIAGNOSIS — F17.210 CIGARETTE NICOTINE DEPENDENCE WITHOUT COMPLICATION: ICD-10-CM

## 2023-08-07 DIAGNOSIS — E03.9 ACQUIRED HYPOTHYROIDISM: ICD-10-CM

## 2023-08-07 LAB
ALBUMIN SERPL-MCNC: 4.4 G/DL (ref 3.5–5.2)
ALBUMIN/GLOB SERPL: 1.7 G/DL
ALP SERPL-CCNC: 129 U/L (ref 39–117)
ALT SERPL W P-5'-P-CCNC: 13 U/L (ref 1–33)
ANION GAP SERPL CALCULATED.3IONS-SCNC: 10.7 MMOL/L (ref 5–15)
AST SERPL-CCNC: 16 U/L (ref 1–32)
BASOPHILS # BLD AUTO: 0.07 10*3/MM3 (ref 0–0.2)
BASOPHILS NFR BLD AUTO: 1.1 % (ref 0–1.5)
BILIRUB SERPL-MCNC: 0.6 MG/DL (ref 0–1.2)
BUN SERPL-MCNC: 15 MG/DL (ref 8–23)
BUN/CREAT SERPL: 12.5 (ref 7–25)
CALCIUM SPEC-SCNC: 9.9 MG/DL (ref 8.6–10.5)
CHLORIDE SERPL-SCNC: 102 MMOL/L (ref 98–107)
CHOLEST SERPL-MCNC: 137 MG/DL (ref 0–200)
CO2 SERPL-SCNC: 29.3 MMOL/L (ref 22–29)
CREAT SERPL-MCNC: 1.2 MG/DL (ref 0.57–1)
DEPRECATED RDW RBC AUTO: 40.8 FL (ref 37–54)
EGFRCR SERPLBLD CKD-EPI 2021: 51.6 ML/MIN/1.73
EOSINOPHIL # BLD AUTO: 0.16 10*3/MM3 (ref 0–0.4)
EOSINOPHIL NFR BLD AUTO: 2.6 % (ref 0.3–6.2)
ERYTHROCYTE [DISTWIDTH] IN BLOOD BY AUTOMATED COUNT: 12.6 % (ref 12.3–15.4)
GLOBULIN UR ELPH-MCNC: 2.6 GM/DL
GLUCOSE SERPL-MCNC: 91 MG/DL (ref 65–99)
HCT VFR BLD AUTO: 46.3 % (ref 34–46.6)
HDLC SERPL-MCNC: 67 MG/DL (ref 40–60)
HGB BLD-MCNC: 15.7 G/DL (ref 12–15.9)
IMM GRANULOCYTES # BLD AUTO: 0.02 10*3/MM3 (ref 0–0.05)
IMM GRANULOCYTES NFR BLD AUTO: 0.3 % (ref 0–0.5)
LDLC SERPL CALC-MCNC: 51 MG/DL (ref 0–100)
LDLC/HDLC SERPL: 0.72 {RATIO}
LYMPHOCYTES # BLD AUTO: 1.33 10*3/MM3 (ref 0.7–3.1)
LYMPHOCYTES NFR BLD AUTO: 21.8 % (ref 19.6–45.3)
MCH RBC QN AUTO: 30.1 PG (ref 26.6–33)
MCHC RBC AUTO-ENTMCNC: 33.9 G/DL (ref 31.5–35.7)
MCV RBC AUTO: 88.7 FL (ref 79–97)
MONOCYTES # BLD AUTO: 0.54 10*3/MM3 (ref 0.1–0.9)
MONOCYTES NFR BLD AUTO: 8.9 % (ref 5–12)
NEUTROPHILS NFR BLD AUTO: 3.97 10*3/MM3 (ref 1.7–7)
NEUTROPHILS NFR BLD AUTO: 65.3 % (ref 42.7–76)
NRBC BLD AUTO-RTO: 0 /100 WBC (ref 0–0.2)
PLATELET # BLD AUTO: 186 10*3/MM3 (ref 140–450)
PMV BLD AUTO: 11.8 FL (ref 6–12)
POTASSIUM SERPL-SCNC: 4.2 MMOL/L (ref 3.5–5.2)
PROT SERPL-MCNC: 7 G/DL (ref 6–8.5)
RBC # BLD AUTO: 5.22 10*6/MM3 (ref 3.77–5.28)
SODIUM SERPL-SCNC: 142 MMOL/L (ref 136–145)
T4 FREE SERPL-MCNC: 1.9 NG/DL (ref 0.93–1.7)
TRIGL SERPL-MCNC: 108 MG/DL (ref 0–150)
TSH SERPL DL<=0.05 MIU/L-ACNC: 1.06 UIU/ML (ref 0.27–4.2)
VLDLC SERPL-MCNC: 19 MG/DL (ref 5–40)
WBC NRBC COR # BLD: 6.09 10*3/MM3 (ref 3.4–10.8)

## 2023-08-07 PROCEDURE — 80061 LIPID PANEL: CPT | Performed by: NURSE PRACTITIONER

## 2023-08-07 PROCEDURE — 80053 COMPREHEN METABOLIC PANEL: CPT | Performed by: NURSE PRACTITIONER

## 2023-08-07 PROCEDURE — 85025 COMPLETE CBC W/AUTO DIFF WBC: CPT | Performed by: NURSE PRACTITIONER

## 2023-08-07 PROCEDURE — 84439 ASSAY OF FREE THYROXINE: CPT | Performed by: NURSE PRACTITIONER

## 2023-08-07 PROCEDURE — 84443 ASSAY THYROID STIM HORMONE: CPT | Performed by: NURSE PRACTITIONER

## 2023-08-07 RX ORDER — LEVOTHYROXINE SODIUM 0.12 MG/1
125 TABLET ORAL DAILY
Qty: 90 TABLET | Refills: 1 | Status: SHIPPED | OUTPATIENT
Start: 2023-08-07

## 2023-08-07 RX ORDER — METOPROLOL SUCCINATE 25 MG/1
25 TABLET, EXTENDED RELEASE ORAL DAILY
Qty: 90 TABLET | Refills: 1 | Status: SHIPPED | OUTPATIENT
Start: 2023-08-07

## 2023-08-07 RX ORDER — FLUTICASONE FUROATE AND VILANTEROL 100; 25 UG/1; UG/1
1 POWDER RESPIRATORY (INHALATION)
Qty: 28 EACH | Refills: 5 | Status: SHIPPED | OUTPATIENT
Start: 2023-08-07

## 2023-08-07 RX ORDER — SUMATRIPTAN 100 MG/1
100 TABLET, FILM COATED ORAL AS NEEDED
Qty: 9 TABLET | Refills: 5 | Status: SHIPPED | OUTPATIENT
Start: 2023-08-07

## 2023-08-07 RX ORDER — ATORVASTATIN CALCIUM 40 MG/1
40 TABLET, FILM COATED ORAL NIGHTLY
Qty: 90 TABLET | Refills: 1 | Status: SHIPPED | OUTPATIENT
Start: 2023-08-07

## 2023-08-07 RX ORDER — ALBUTEROL SULFATE 90 UG/1
2 AEROSOL, METERED RESPIRATORY (INHALATION) EVERY 4 HOURS PRN
Qty: 18 G | Refills: 0 | Status: SHIPPED | OUTPATIENT
Start: 2023-08-07

## 2023-08-07 NOTE — PROGRESS NOTES
Chief Complaint  Hypothyroidism, Hyperlipidemia, and Hypertension    Subjective          Italia Beard presents to [facility]  History of Present Illness  She is fasting for labs today.  She has been taking her Synthroid daily with no issues noted.  LIPID:  She is taking her Lipitor daily.  NO leg issues.  She is not having any chest pain or shortness of breath noted.  She does currently smoke and is not ready to stop smoking. She would like an inhaler but doesn't have insurance currently.  She used to be on breo with Jazmín and that worked well.      Migraine:  She has been taking her imitrex when needed.  She will take advil and that does calm the headache.    She has a pacemaker and does see cardiology 4/2024--once a year just saw him 4/2023      Depression: Not at risk    PHQ-2 Score: 0    and 2/7/2023    BMI is >= 25 and <30. (Overweight) The following options were offered after discussion;: exercise counseling/recommendations and nutrition counseling/recommendations         Allergies  Penicillins    Social History     Tobacco Use    Smoking status: Every Day     Packs/day: 1.50     Years: 40.00     Pack years: 60.00     Types: Cigarettes    Smokeless tobacco: Never   Vaping Use    Vaping Use: Never used   Substance Use Topics    Alcohol use: Never    Drug use: Never       Family History   Problem Relation Age of Onset    No Known Problems Mother     Heart attack Father     Malig Hyperthermia Neg Hx         Health Maintenance Due   Topic Date Due    ANNUAL PHYSICAL  Never done        Immunization History   Administered Date(s) Administered    Flu Vaccine Intradermal Quad 18-64YR 09/24/2020    Hepatitis A 05/18/2018    Influenza Seasonal Injectable 09/24/2020       Review of Systems   Constitutional:  Negative for fatigue.   Respiratory:  Positive for cough. Negative for shortness of breath.    Cardiovascular:  Negative for chest pain.   Gastrointestinal:  Negative for  "diarrhea, nausea and vomiting.      Objective       Vitals:    08/07/23 0754 08/07/23 0800   BP: 152/76 160/90   Pulse: 66    Resp: 18    Temp: 96.7 øF (35.9 øC)    SpO2: 96%    Weight: 76.7 kg (169 lb 1.6 oz)    Height: 162.6 cm (64\")        Body mass index is 29.03 kg/mý.         Physical Exam  Vitals reviewed.   Constitutional:       Appearance: Normal appearance. She is well-developed.   Cardiovascular:      Rate and Rhythm: Normal rate and regular rhythm.      Heart sounds: Normal heart sounds. No murmur heard.  Pulmonary:      Effort: Pulmonary effort is normal.      Breath sounds: Wheezing and rhonchi present.   Neurological:      Mental Status: She is alert and oriented to person, place, and time.      Cranial Nerves: No cranial nerve deficit.      Motor: No weakness.   Psychiatric:         Mood and Affect: Mood and affect normal.           Result Review :     The following data was reviewed by: FABIAN Mendez on 08/07/2023:    Common Labs   Common labs          2/7/2023    08:22   Common Labs   Glucose 93    BUN 11    Creatinine 1.01    Sodium 141    Potassium 3.9    Chloride 104    Calcium 9.8    Albumin 4.4    Total Bilirubin 0.4    Alkaline Phosphatase 119    AST (SGOT) 16    ALT (SGPT) 14    WBC 4.83    Hemoglobin 14.7    Hematocrit 45.4    Platelets 178    Total Cholesterol 131    Triglycerides 99    HDL Cholesterol 71    LDL Cholesterol  42                     Assessment and Plan      Diagnoses and all orders for this visit:    1. Primary hypertension (Primary)  -     Comprehensive Metabolic Panel  -     CBC & Differential  -     albuterol sulfate  (90 Base) MCG/ACT inhaler; Inhale 2 puffs Every 4 (Four) Hours As Needed for Wheezing.  Dispense: 18 g; Refill: 0  -     metoprolol succinate XL (Toprol XL) 25 MG 24 hr tablet; Take 1 tablet by mouth Daily.  Dispense: 90 tablet; Refill: 1    2. Chronic obstructive pulmonary disease, unspecified COPD type  -     albuterol sulfate  " (90 Base) MCG/ACT inhaler; Inhale 2 puffs Every 4 (Four) Hours As Needed for Wheezing.  Dispense: 18 g; Refill: 0  -     Fluticasone Furoate-Vilanterol (Breo Ellipta) 100-25 MCG/ACT aerosol powder ; Inhale 1 puff Daily.  Dispense: 28 each; Refill: 5    3. Mixed hyperlipidemia  -     Comprehensive Metabolic Panel  -     CBC & Differential  -     Lipid Panel  -     atorvastatin (LIPITOR) 40 MG tablet; Take 1 tablet by mouth Every Night.  Dispense: 90 tablet; Refill: 1    4. Acquired hypothyroidism  -     TSH  -     levothyroxine (Synthroid) 125 MCG tablet; Take 1 tablet by mouth Daily.  Dispense: 90 tablet; Refill: 1  -     T4, Free    5. Other migraine without status migrainosus, intractable  -     SUMAtriptan (IMITREX) 100 MG tablet; Take 1 tablet by mouth As Needed for Migraine.  Dispense: 9 tablet; Refill: 5    6. Postmenopausal  -     DEXA Bone Density Axial; Future    7. Screening mammogram for breast cancer  -     Mammo Screening Digital Tomosynthesis Bilateral With CAD; Future    8. Cigarette nicotine dependence without complication  -      CT Chest Low Dose Cancer Screening WO; Future  -     Fluticasone Furoate-Vilanterol (Breo Ellipta) 100-25 MCG/ACT aerosol powder ; Inhale 1 puff Daily.  Dispense: 28 each; Refill: 5            Follow Up     Return in about 6 months (around 2/7/2024).  Check BP daily for 1 week and let me know how it is doing--anytime of the day.  Follow-up in 6 months for labs and appt. Call with any concerns or questions that you may have regarding your medications or history.    We will go ahead and send over the Breo though her insurance does not kick in until September.  We will also go ahead and get scheduled for the low-dose CT, mammo, DEXA as she will have insurance at the beginning of the month.  She will keep me posted when she is ready to quit smoking.  Patient was given instructions and counseling regarding her condition or for health maintenance advice. Please see specific  information pulled into the AVS if appropriate.     Parts of this note are electronic transcriptions/translations of spoken language to printed text using the Dragon Dictation system.      Italia Beard  reports that she has been smoking cigarettes. She has a 60.00 pack-year smoking history. She has never used smokeless tobacco.. I have educated her on the risk of diseases from using tobacco products such as cancer, COPD, and heart disease.     I advised her to quit and she is not willing to quit.    I spent 3  minutes counseling the patient.         Cherelle Jones, APRN  08/07/2023

## 2023-08-22 ENCOUNTER — TELEPHONE (OUTPATIENT)
Dept: FAMILY MEDICINE CLINIC | Facility: CLINIC | Age: 63
End: 2023-08-22

## 2023-08-22 NOTE — TELEPHONE ENCOUNTER
Caller: Italia Beard    Relationship to patient: Self    Best call back number: [phone]     Patient is needing: PATIENT STATES THAT SHE HAS A PACEMAKER,HIGH BLOOD PRESSURE, CHOLESTEROL AND THYROID ISSUES. PATIENT IS INQUIRING ABOUT WHAT OVER THE COUNTER COLD MEDICINE SHE CAN TAKE FOR COUGH, RUNNY NOSE. PLEASE ADVISE.

## 2023-09-06 ENCOUNTER — HOSPITAL ENCOUNTER (OUTPATIENT)
Dept: CT IMAGING | Facility: HOSPITAL | Age: 63
Discharge: HOME | End: 2023-09-06
Admitting: NURSE PRACTITIONER
Payer: MEDICARE

## 2023-09-06 DIAGNOSIS — F17.210 CIGARETTE NICOTINE DEPENDENCE WITHOUT COMPLICATION: ICD-10-CM

## 2023-09-06 PROCEDURE — 71271 CT THORAX LUNG CANCER SCR C-: CPT

## 2023-09-27 DIAGNOSIS — F17.210 CIGARETTE NICOTINE DEPENDENCE WITHOUT COMPLICATION: ICD-10-CM

## 2023-09-27 DIAGNOSIS — J44.9 CHRONIC OBSTRUCTIVE PULMONARY DISEASE, UNSPECIFIED COPD TYPE: ICD-10-CM

## 2023-09-27 RX ORDER — FLUTICASONE FUROATE AND VILANTEROL 100; 25 UG/1; UG/1
1 POWDER RESPIRATORY (INHALATION)
Qty: 28 EACH | Refills: 5 | OUTPATIENT
Start: 2023-09-27

## 2023-11-27 ENCOUNTER — OFFICE VISIT (OUTPATIENT)
Dept: FAMILY MEDICINE CLINIC | Facility: CLINIC | Age: 63
End: 2023-11-27
Payer: MEDICARE

## 2023-11-27 VITALS
HEIGHT: 64 IN | DIASTOLIC BLOOD PRESSURE: 82 MMHG | HEART RATE: 80 BPM | BODY MASS INDEX: 28.85 KG/M2 | WEIGHT: 169 LBS | SYSTOLIC BLOOD PRESSURE: 138 MMHG | TEMPERATURE: 97.9 F | OXYGEN SATURATION: 96 %

## 2023-11-27 DIAGNOSIS — J44.9 CHRONIC OBSTRUCTIVE PULMONARY DISEASE, UNSPECIFIED COPD TYPE: ICD-10-CM

## 2023-11-27 DIAGNOSIS — H69.92 DYSFUNCTION OF LEFT EUSTACHIAN TUBE: Primary | ICD-10-CM

## 2023-11-27 DIAGNOSIS — F17.210 CIGARETTE NICOTINE DEPENDENCE WITHOUT COMPLICATION: ICD-10-CM

## 2023-11-27 PROCEDURE — 1160F RVW MEDS BY RX/DR IN RCRD: CPT | Performed by: NURSE PRACTITIONER

## 2023-11-27 PROCEDURE — 99214 OFFICE O/P EST MOD 30 MIN: CPT | Performed by: NURSE PRACTITIONER

## 2023-11-27 PROCEDURE — 1159F MED LIST DOCD IN RCRD: CPT | Performed by: NURSE PRACTITIONER

## 2023-11-27 RX ORDER — FLUTICASONE PROPIONATE 50 MCG
2 SPRAY, SUSPENSION (ML) NASAL DAILY
Qty: 16 G | Refills: 5 | Status: SHIPPED | OUTPATIENT
Start: 2023-11-27

## 2023-11-27 RX ORDER — BUPROPION HYDROCHLORIDE 150 MG/1
TABLET, EXTENDED RELEASE ORAL
Qty: 60 TABLET | Refills: 3 | Status: SHIPPED | OUTPATIENT
Start: 2023-11-27 | End: 2024-03-03

## 2023-11-27 RX ORDER — LORATADINE 10 MG/1
10 TABLET ORAL DAILY
Qty: 30 TABLET | Refills: 5 | Status: SHIPPED | OUTPATIENT
Start: 2023-11-27

## 2023-11-27 RX ORDER — IPRATROPIUM BROMIDE AND ALBUTEROL SULFATE 2.5; .5 MG/3ML; MG/3ML
3 SOLUTION RESPIRATORY (INHALATION) 2 TIMES DAILY
Qty: 1080 ML | Refills: 5 | Status: SHIPPED | OUTPATIENT
Start: 2023-11-27

## 2023-11-27 NOTE — ASSESSMENT & PLAN NOTE
COPD is unchanged.  COPD information handout given.  Discussed monitoring symptoms and use of quick-relief medications and contacting us early in the course of exacerbations.  Warning signs of respiratory distress were reviewed with the patient.   Medication changes per orders.  Discussed medication dosage, use, side effects, and goals of treatment in detail.    Discussed technique for using MDIs and/or nebulizer.  Follow up in 3 months, or sooner should new symptoms or problems arise.  We discussed that we can switch her Breo inhaler to DuoNebs that should be covered by her insurance.  Advised her to continue her Breo inhaler until she gets her DuoNebs in the mail.

## 2023-11-27 NOTE — PROGRESS NOTES
"Chief Complaint  Earache (Left ear pain, clogged, and unable to hear)    Subjective          Italia Beard, 63 y.o. female presents to [facility]  History of Present Illness   Patient presents today for an acute visit.  She is a patient of FABIAN Mendez.  She is complaining of left fullness, decreased hearing in the left ear and occasional dizziness.    She has COPD and uses Breo inhaler.  She states that she was told her next prescription was going to cost $300.  She currently has the Breo inhaler and has been using it.  She also has albuterol inhaler to use as needed.      Italia Beard  reports that she has been smoking cigarettes. She has a 60.00 pack-year smoking history. She has never used smokeless tobacco.. I have educated her on the risk of diseases from using tobacco products such as cancer, COPD, and heart disease.     I advised her to quit and she is willing to quit. We have discussed the following method/s for tobacco cessation:  Education Material Counseling Prescription Medicaiton.  She has tried nicotine patches and Chantix in the past.  She states Chantix made her nauseous.  Together we have set a quit date for 1 month from today.  She will follow up with me in 3 months or sooner to check on her progress.    I spent 8 minutes counseling the patient.     Tobacco Use: High Risk (11/27/2023)    Patient History     Smoking Tobacco Use: Every Day     Smokeless Tobacco Use: Never     Passive Exposure: Not on file      Objective   Vital Signs:   /82 (BP Location: Left arm, Patient Position: Sitting, Cuff Size: Adult)   Pulse 80   Temp 97.9 °F (36.6 °C)   Ht 162.6 cm (64\")   Wt 76.7 kg (169 lb)   SpO2 96%   BMI 29.01 kg/m²       Current Outpatient Medications:     albuterol sulfate  (90 Base) MCG/ACT inhaler, Inhale 2 puffs Every 4 (Four) Hours As Needed for Wheezing., Disp: 18 g, Rfl: 0    aspirin 81 MG chewable tablet, " Chew 1 tablet Every Night., Disp: , Rfl:     atorvastatin (LIPITOR) 40 MG tablet, Take 1 tablet by mouth Every Night., Disp: 90 tablet, Rfl: 1    cyanocobalamin (VITAMIN B-12) 1000 MCG tablet, Take 1 tablet by mouth Daily., Disp: , Rfl:     ferrous sulfate 325 (65 FE) MG tablet, Take 1 tablet by mouth Daily With Breakfast., Disp: , Rfl:     Fluticasone Furoate-Vilanterol (Breo Ellipta) 100-25 MCG/ACT aerosol powder , Inhale 1 puff Daily., Disp: 28 each, Rfl: 5    levothyroxine (Synthroid) 125 MCG tablet, Take 1 tablet by mouth Daily., Disp: 90 tablet, Rfl: 1    metoprolol succinate XL (Toprol XL) 25 MG 24 hr tablet, Take 1 tablet by mouth Daily., Disp: 90 tablet, Rfl: 1    Nutritional Supplements (MENOPAUSE FORMULA PO), Take  by mouth., Disp: , Rfl:     SUMAtriptan (IMITREX) 100 MG tablet, Take 1 tablet by mouth As Needed for Migraine., Disp: 9 tablet, Rfl: 5    buPROPion SR (Wellbutrin SR) 150 MG 12 hr tablet, Take 1 tablet by mouth Daily for 7 days, THEN 1 tablet 2 (Two) Times a Day for 90 days., Disp: 60 tablet, Rfl: 3    fluticasone (FLONASE) 50 MCG/ACT nasal spray, 2 sprays into the nostril(s) as directed by provider Daily., Disp: 16 g, Rfl: 5    ipratropium-albuterol (DUO-NEB) 0.5-2.5 mg/3 ml nebulizer, Take 3 mL by nebulization 2 (Two) Times a Day. And as needed, Disp: 1080 mL, Rfl: 5    loratadine (Claritin) 10 MG tablet, Take 1 tablet by mouth Daily., Disp: 30 tablet, Rfl: 5   Past Medical History:   Diagnosis Date    Anemia     B12 deficiency     COPD (chronic obstructive pulmonary disease)     Heart murmur     Hyperlipidemia     Hypertension     Hypothyroidism     Migraines     Positive colorectal cancer screening using Cologuard test       Physical Exam  Vitals reviewed.   Constitutional:       Appearance: Normal appearance. She is well-developed.   HENT:      Right Ear: Tympanic membrane, ear canal and external ear normal.      Left Ear: Ear canal and external ear normal. Decreased hearing noted. A  middle ear effusion is present.      Mouth/Throat:      Mouth: Mucous membranes are moist.      Pharynx: No pharyngeal swelling, oropharyngeal exudate or posterior oropharyngeal erythema.   Neck:      Thyroid: No thyroid mass, thyromegaly or thyroid tenderness.   Cardiovascular:      Rate and Rhythm: Normal rate and regular rhythm.      Heart sounds: No murmur heard.     No friction rub. No gallop.   Pulmonary:      Effort: Pulmonary effort is normal.      Breath sounds: Normal breath sounds. No wheezing or rhonchi.   Lymphadenopathy:      Cervical: No cervical adenopathy.   Skin:     General: Skin is warm and dry.   Neurological:      Mental Status: She is alert and oriented to person, place, and time.      Cranial Nerves: No cranial nerve deficit.   Psychiatric:         Mood and Affect: Mood and affect normal.         Behavior: Behavior normal.         Thought Content: Thought content normal. Thought content does not include homicidal or suicidal ideation.         Judgment: Judgment normal.        Result Review :   {The following data was reviewed by FABIAN Buenrostro    CT Chest Low Dose Cancer Screening WO    Result Date: 9/6/2023    1. No evidence of lung cancer. 2. No acute cardiopulmonary process. 3. Moderate emphysema.  LUNG RADS:                           1 (negative, less than 1% chance of malignancy)     BIA COLLINS MD       Electronically Signed and Approved By: BIA COLLINS MD on 9/06/2023 at 11:09                    Assessment and Plan    Diagnoses and all orders for this visit:    1. Dysfunction of left eustachian tube (Primary)  Assessment & Plan:  I will have her start loratadine 10 mg daily and Flonase nasal spray daily.    Orders:  -     fluticasone (FLONASE) 50 MCG/ACT nasal spray; 2 sprays into the nostril(s) as directed by provider Daily.  Dispense: 16 g; Refill: 5  -     loratadine (Claritin) 10 MG tablet; Take 1 tablet by mouth Daily.  Dispense: 30 tablet; Refill: 5    2.  Cigarette nicotine dependence without complication  Assessment & Plan:  Tobacco use is unchanged.  Smoking cessation counseling was provided.  Pharmacotherapy was prescribed as ordered.  We discussed trying Wellbutrin.  She denies any history of any seizures.  I advised her to start Wellbutrin once daily for 7 days and then increase to twice daily.  Tobacco use will be reassessed in 3 months.    Orders:  -     buPROPion SR (Wellbutrin SR) 150 MG 12 hr tablet; Take 1 tablet by mouth Daily for 7 days, THEN 1 tablet 2 (Two) Times a Day for 90 days.  Dispense: 60 tablet; Refill: 3  -     ipratropium-albuterol (DUO-NEB) 0.5-2.5 mg/3 ml nebulizer; Take 3 mL by nebulization 2 (Two) Times a Day. And as needed  Dispense: 1080 mL; Refill: 5    3. Chronic obstructive pulmonary disease, unspecified COPD type  Assessment & Plan:  COPD is unchanged.  COPD information handout given.  Discussed monitoring symptoms and use of quick-relief medications and contacting us early in the course of exacerbations.  Warning signs of respiratory distress were reviewed with the patient.   Medication changes per orders.  Discussed medication dosage, use, side effects, and goals of treatment in detail.    Discussed technique for using MDIs and/or nebulizer.  Follow up in 3 months, or sooner should new symptoms or problems arise.  We discussed that we can switch her Breo inhaler to DuoNebs that should be covered by her insurance.  Advised her to continue her Breo inhaler until she gets her DuoNebs in the mail.              Follow Up   Return in 3 months (on 2/27/2024) for Next scheduled follow up with Cherelle.  Patient was given instructions and counseling regarding her condition or for health maintenance advice. Please see specific information pulled into the AVS if appropriate.     Parts of this note are electronic transcriptions/translations of spoken language to printed text using the Dragon Dictation system.      Nay Agarwal,  APRN  11/27/2023

## 2023-11-27 NOTE — ASSESSMENT & PLAN NOTE
Tobacco use is unchanged.  Smoking cessation counseling was provided.  Pharmacotherapy was prescribed as ordered.  We discussed trying Wellbutrin.  She denies any history of any seizures.  I advised her to start Wellbutrin once daily for 7 days and then increase to twice daily.  Tobacco use will be reassessed in 3 months.

## 2023-12-09 DIAGNOSIS — I10 PRIMARY HYPERTENSION: ICD-10-CM

## 2023-12-09 DIAGNOSIS — J44.9 CHRONIC OBSTRUCTIVE PULMONARY DISEASE, UNSPECIFIED COPD TYPE: ICD-10-CM

## 2023-12-11 RX ORDER — ALBUTEROL SULFATE 90 UG/1
2 AEROSOL, METERED RESPIRATORY (INHALATION) EVERY 4 HOURS PRN
Qty: 18 G | Refills: 0 | Status: SHIPPED | OUTPATIENT
Start: 2023-12-11

## 2023-12-26 ENCOUNTER — TELEPHONE (OUTPATIENT)
Dept: FAMILY MEDICINE CLINIC | Facility: CLINIC | Age: 63
End: 2023-12-26

## 2023-12-26 NOTE — TELEPHONE ENCOUNTER
Caller: Italia Beard    Relationship to patient: Self    Best call back number: [phone]    Date of positive COVID19 test: 12/25    COVID19 symptoms: HEADACHE,COUGH,CHILLS     Date of initial quarantine: 12/25    Additional information or concerns: PATIENT WOULD LIKE TO KNOW IF PCP COULD SEND IN MEDICATION FOR SYMPTOMS     What is the patients preferred pharmacy: [facility] [address] [place] [address] [phone] [facility] [phone]

## 2023-12-26 NOTE — TELEPHONE ENCOUNTER
Pt called and I informed her of the messaged received. She has decided to try OTC medications, I told her if her symptoms worsen or do not get better to call us back and we will schedule her an appointment. Pt verbalized understanding

## 2023-12-27 NOTE — TELEPHONE ENCOUNTER
Caller: Italia Beard    Relationship: Self    Best call back number: [phone]     Who are you requesting to speak with (clinical staff, provider,  specific staff member): MEDICAL STAFF    What was the call regarding: PATIENT WOULD LIKE TO KNOW WHAT OVER THE COUNTER MEDICATIONS SHE IS ABLE TO TAKE FOR HER SYMPTOMS. PLEASE CALL PATIENT TO ADVISE.

## 2024-01-02 ENCOUNTER — OFFICE VISIT (OUTPATIENT)
Dept: FAMILY MEDICINE CLINIC | Facility: CLINIC | Age: 64
End: 2024-01-02
Payer: MEDICARE

## 2024-01-02 VITALS
BODY MASS INDEX: 29.01 KG/M2 | HEART RATE: 62 BPM | DIASTOLIC BLOOD PRESSURE: 88 MMHG | TEMPERATURE: 97.4 F | OXYGEN SATURATION: 99 % | HEIGHT: 64 IN | SYSTOLIC BLOOD PRESSURE: 132 MMHG

## 2024-01-02 DIAGNOSIS — U07.1 POSITIVE SELF-ADMINISTERED ANTIGEN TEST FOR COVID-19: Primary | ICD-10-CM

## 2024-01-02 DIAGNOSIS — J06.9 UPPER RESPIRATORY INFECTION WITH COUGH AND CONGESTION: ICD-10-CM

## 2024-01-02 PROCEDURE — 99213 OFFICE O/P EST LOW 20 MIN: CPT | Performed by: NURSE PRACTITIONER

## 2024-01-02 PROCEDURE — 1159F MED LIST DOCD IN RCRD: CPT | Performed by: NURSE PRACTITIONER

## 2024-01-02 PROCEDURE — 1160F RVW MEDS BY RX/DR IN RCRD: CPT | Performed by: NURSE PRACTITIONER

## 2024-01-02 RX ORDER — DEXAMETHASONE 4 MG/1
4 TABLET ORAL 2 TIMES DAILY WITH MEALS
Qty: 10 TABLET | Refills: 0 | Status: SHIPPED | OUTPATIENT
Start: 2024-01-02 | End: 2024-01-07

## 2024-01-02 RX ORDER — GUAIFENESIN 600 MG/1
1200 TABLET, EXTENDED RELEASE ORAL 2 TIMES DAILY
COMMUNITY

## 2024-01-02 NOTE — PROGRESS NOTES
"Chief Complaint  COVID +, Cough, and Nasal Congestion    Subjective          Italia L Kisha, 63 y.o. female presents to [facility]  History of Present Illness   For an acute visit.  She is a patient FABIAN Mendez.  She is complaining of cough and congestion and a headache.  She states she did a home COVID test on 12/24/2023 and it was positive for COVID.  She has been doing over-the-counter Mucinex and she has tried Stahist.  She states the Stahist dried her nose and mouth too much.  She states the Mucinex is helping her with her cough and congestion.  Her biggest complaint is her headache and the drainage.  She is a current smoker.  She does have COPD.     Tobacco Use: High Risk (1/2/2024)    Patient History     Smoking Tobacco Use: Every Day     Smokeless Tobacco Use: Never     Passive Exposure: Not on file      Objective   Vital Signs:   /88   Pulse 62   Temp 97.4 °F (36.3 °C)   Ht 162.6 cm (64\")   SpO2 99%   BMI 29.01 kg/m²       Current Outpatient Medications:     albuterol sulfate  (90 Base) MCG/ACT inhaler, Inhale 2 puffs Every 4 (Four) Hours As Needed for Wheezing., Disp: 18 g, Rfl: 0    aspirin 81 MG chewable tablet, Chew 1 tablet Every Night., Disp: , Rfl:     atorvastatin (LIPITOR) 40 MG tablet, Take 1 tablet by mouth Every Night., Disp: 90 tablet, Rfl: 1    buPROPion SR (Wellbutrin SR) 150 MG 12 hr tablet, Take 1 tablet by mouth Daily for 7 days, THEN 1 tablet 2 (Two) Times a Day for 90 days., Disp: 60 tablet, Rfl: 3    cyanocobalamin (VITAMIN B-12) 1000 MCG tablet, Take 1 tablet by mouth Daily., Disp: , Rfl:     ferrous sulfate 325 (65 FE) MG tablet, Take 1 tablet by mouth Daily With Breakfast., Disp: , Rfl:     fluticasone (FLONASE) 50 MCG/ACT nasal spray, 2 sprays into the nostril(s) as directed by provider Daily., Disp: 16 g, Rfl: 5    Fluticasone Furoate-Vilanterol (Breo Ellipta) 100-25 MCG/ACT aerosol powder , Inhale 1 puff " Daily., Disp: 28 each, Rfl: 5    ipratropium-albuterol (DUO-NEB) 0.5-2.5 mg/3 ml nebulizer, Take 3 mL by nebulization 2 (Two) Times a Day. And as needed, Disp: 1080 mL, Rfl: 5    levothyroxine (Synthroid) 125 MCG tablet, Take 1 tablet by mouth Daily., Disp: 90 tablet, Rfl: 1    loratadine (Claritin) 10 MG tablet, Take 1 tablet by mouth Daily., Disp: 30 tablet, Rfl: 5    metoprolol succinate XL (Toprol XL) 25 MG 24 hr tablet, Take 1 tablet by mouth Daily., Disp: 90 tablet, Rfl: 1    Nutritional Supplements (MENOPAUSE FORMULA PO), Take  by mouth., Disp: , Rfl:     SUMAtriptan (IMITREX) 100 MG tablet, Take 1 tablet by mouth As Needed for Migraine., Disp: 9 tablet, Rfl: 5    dexAMETHasone (DECADRON) 4 MG tablet, Take 1 tablet by mouth 2 (Two) Times a Day With Meals for 5 days., Disp: 10 tablet, Rfl: 0    guaiFENesin (MUCINEX) 600 MG 12 hr tablet, Take 2 tablets by mouth 2 (Two) Times a Day., Disp: , Rfl:    Past Medical History:   Diagnosis Date    Anemia     B12 deficiency     COPD (chronic obstructive pulmonary disease)     Heart murmur     Hyperlipidemia     Hypertension     Hypothyroidism     Migraines     Positive colorectal cancer screening using Cologuard test       Physical Exam  Vitals reviewed.   Constitutional:       Appearance: Normal appearance. She is well-developed.   HENT:      Right Ear: Tympanic membrane, ear canal and external ear normal.      Left Ear: Tympanic membrane, ear canal and external ear normal.      Mouth/Throat:      Mouth: Mucous membranes are moist.      Pharynx: No pharyngeal swelling, oropharyngeal exudate or posterior oropharyngeal erythema.   Neck:      Thyroid: No thyroid mass, thyromegaly or thyroid tenderness.   Cardiovascular:      Rate and Rhythm: Normal rate and regular rhythm.      Heart sounds: No murmur heard.     No friction rub. No gallop.   Pulmonary:      Effort: Pulmonary effort is normal.      Breath sounds: Normal breath sounds. No wheezing or rhonchi.    Lymphadenopathy:      Cervical: No cervical adenopathy.   Skin:     General: Skin is warm and dry.   Neurological:      Mental Status: She is alert and oriented to person, place, and time.      Cranial Nerves: No cranial nerve deficit.   Psychiatric:         Mood and Affect: Mood and affect normal.         Behavior: Behavior normal.         Thought Content: Thought content normal. Thought content does not include homicidal or suicidal ideation.         Judgment: Judgment normal.        Result Review :   {The following data was reviewed by FABIAN Buenrostro    CT Chest Low Dose Cancer Screening WO    Result Date: 9/6/2023    1. No evidence of lung cancer. 2. No acute cardiopulmonary process. 3. Moderate emphysema.  LUNG RADS:                           1 (negative, less than 1% chance of malignancy)     BIA COLLINS MD       Electronically Signed and Approved By: BIA COLLINS MD on 9/06/2023 at 11:09                      Assessment and Plan    Diagnoses and all orders for this visit:    1. Positive self-administered antigen test for COVID-19 (Primary)  -     dexAMETHasone (DECADRON) 4 MG tablet; Take 1 tablet by mouth 2 (Two) Times a Day With Meals for 5 days.  Dispense: 10 tablet; Refill: 0    2. Upper respiratory infection with cough and congestion  -     dexAMETHasone (DECADRON) 4 MG tablet; Take 1 tablet by mouth 2 (Two) Times a Day With Meals for 5 days.  Dispense: 10 tablet; Refill: 0    Discussed with patient to quarantine for at least 5 days from onset of symptoms and symptoms have resolved.  Patient instructed not to go anywhere except to seek medical care.  Advised to wear a mask if patient goes anywhere for the next 5 days.  Instructed to seek medical care for any difficulty of breathing, unable to keep fluids down, or worsening of symptoms.  Discussed with patient upper respiratory infection is viral and antibiotics are not warranted.  Discussed symptom management, take Tylenol or  ibuprofen as needed, drink lots of fluids and rest.  Instructed to call back if symptoms do not improve or worsen by day 10.  I will prescribe her Decadron 4 mg twice daily with food for 5 days.    Follow Up   No follow-ups on file.  Patient was given instructions and counseling regarding her condition or for health maintenance advice. Please see specific information pulled into the AVS if appropriate.     Parts of this note are electronic transcriptions/translations of spoken language to printed text using the Dragon Dictation system.      Nay Agarwal, APRN  01/02/2024

## 2024-01-05 ENCOUNTER — PATIENT ROUNDING (BHMG ONLY) (OUTPATIENT)
Dept: FAMILY MEDICINE CLINIC | Facility: CLINIC | Age: 64
End: 2024-01-05
Payer: MEDICARE

## 2024-01-08 DIAGNOSIS — E03.9 ACQUIRED HYPOTHYROIDISM: ICD-10-CM

## 2024-01-08 RX ORDER — LEVOTHYROXINE SODIUM 0.12 MG/1
125 TABLET ORAL DAILY
Qty: 90 TABLET | Refills: 0 | Status: SHIPPED | OUTPATIENT
Start: 2024-01-08

## 2024-01-12 ENCOUNTER — HOSPITAL ENCOUNTER (OUTPATIENT)
Dept: MAMMOGRAPHY | Facility: HOSPITAL | Age: 64
Discharge: HOME | End: 2024-01-12
Payer: MEDICARE

## 2024-01-12 ENCOUNTER — HOSPITAL ENCOUNTER (OUTPATIENT)
Dept: BONE DENSITY | Facility: HOSPITAL | Age: 64
Discharge: HOME | End: 2024-01-12
Payer: MEDICARE

## 2024-01-12 DIAGNOSIS — Z12.31 SCREENING MAMMOGRAM FOR BREAST CANCER: ICD-10-CM

## 2024-01-12 DIAGNOSIS — Z78.0 POSTMENOPAUSAL: ICD-10-CM

## 2024-01-12 PROCEDURE — 77067 SCR MAMMO BI INCL CAD: CPT

## 2024-01-12 PROCEDURE — 77080 DXA BONE DENSITY AXIAL: CPT

## 2024-01-12 PROCEDURE — 77063 BREAST TOMOSYNTHESIS BI: CPT

## 2024-01-15 RX ORDER — ALENDRONATE SODIUM 70 MG/1
70 TABLET ORAL
Qty: 13 TABLET | Refills: 1 | Status: SHIPPED | OUTPATIENT
Start: 2024-01-15

## 2024-01-18 DIAGNOSIS — I10 PRIMARY HYPERTENSION: ICD-10-CM

## 2024-01-18 RX ORDER — METOPROLOL SUCCINATE 25 MG/1
25 TABLET, EXTENDED RELEASE ORAL DAILY
Qty: 90 TABLET | Refills: 0 | OUTPATIENT
Start: 2024-01-18

## 2024-01-26 DIAGNOSIS — E78.2 MIXED HYPERLIPIDEMIA: ICD-10-CM

## 2024-01-26 RX ORDER — ATORVASTATIN CALCIUM 40 MG/1
40 TABLET, FILM COATED ORAL NIGHTLY
Qty: 90 TABLET | Refills: 0 | Status: SHIPPED | OUTPATIENT
Start: 2024-01-26

## 2024-01-31 DIAGNOSIS — I10 PRIMARY HYPERTENSION: ICD-10-CM

## 2024-02-01 RX ORDER — METOPROLOL SUCCINATE 25 MG/1
25 TABLET, EXTENDED RELEASE ORAL DAILY
Qty: 90 TABLET | Refills: 0 | OUTPATIENT
Start: 2024-02-01

## 2024-02-27 ENCOUNTER — OFFICE VISIT (OUTPATIENT)
Dept: FAMILY MEDICINE CLINIC | Facility: CLINIC | Age: 64
End: 2024-02-27
Payer: MEDICARE

## 2024-02-27 VITALS
WEIGHT: 171 LBS | OXYGEN SATURATION: 94 % | HEIGHT: 64 IN | RESPIRATION RATE: 24 BRPM | SYSTOLIC BLOOD PRESSURE: 142 MMHG | BODY MASS INDEX: 29.19 KG/M2 | HEART RATE: 75 BPM | DIASTOLIC BLOOD PRESSURE: 90 MMHG | TEMPERATURE: 97.3 F

## 2024-02-27 DIAGNOSIS — J44.9 CHRONIC OBSTRUCTIVE PULMONARY DISEASE, UNSPECIFIED COPD TYPE: ICD-10-CM

## 2024-02-27 DIAGNOSIS — G43.819 OTHER MIGRAINE WITHOUT STATUS MIGRAINOSUS, INTRACTABLE: ICD-10-CM

## 2024-02-27 DIAGNOSIS — E03.9 ACQUIRED HYPOTHYROIDISM: ICD-10-CM

## 2024-02-27 DIAGNOSIS — F17.210 CIGARETTE NICOTINE DEPENDENCE WITHOUT COMPLICATION: ICD-10-CM

## 2024-02-27 DIAGNOSIS — I10 PRIMARY HYPERTENSION: ICD-10-CM

## 2024-02-27 DIAGNOSIS — Z00.00 WELCOME TO MEDICARE PREVENTIVE VISIT: Primary | ICD-10-CM

## 2024-02-27 DIAGNOSIS — E78.2 MIXED HYPERLIPIDEMIA: ICD-10-CM

## 2024-02-27 LAB
ALBUMIN SERPL-MCNC: 4.4 G/DL (ref 3.5–5.2)
ALBUMIN/GLOB SERPL: 1.5 G/DL
ALP SERPL-CCNC: 118 U/L (ref 39–117)
ALT SERPL W P-5'-P-CCNC: 19 U/L (ref 1–33)
ANION GAP SERPL CALCULATED.3IONS-SCNC: 9 MMOL/L (ref 5–15)
AST SERPL-CCNC: 19 U/L (ref 1–32)
BASOPHILS # BLD AUTO: 0.06 10*3/MM3 (ref 0–0.2)
BASOPHILS NFR BLD AUTO: 0.9 % (ref 0–1.5)
BILIRUB SERPL-MCNC: 0.5 MG/DL (ref 0–1.2)
BUN SERPL-MCNC: 11 MG/DL (ref 8–23)
BUN/CREAT SERPL: 9.6 (ref 7–25)
CALCIUM SPEC-SCNC: 10.1 MG/DL (ref 8.6–10.5)
CHLORIDE SERPL-SCNC: 105 MMOL/L (ref 98–107)
CHOLEST SERPL-MCNC: 149 MG/DL (ref 0–200)
CO2 SERPL-SCNC: 28 MMOL/L (ref 22–29)
CREAT SERPL-MCNC: 1.15 MG/DL (ref 0.57–1)
DEPRECATED RDW RBC AUTO: 40.6 FL (ref 37–54)
EGFRCR SERPLBLD CKD-EPI 2021: 54 ML/MIN/1.73
EOSINOPHIL # BLD AUTO: 0.08 10*3/MM3 (ref 0–0.4)
EOSINOPHIL NFR BLD AUTO: 1.2 % (ref 0.3–6.2)
ERYTHROCYTE [DISTWIDTH] IN BLOOD BY AUTOMATED COUNT: 13 % (ref 12.3–15.4)
GLOBULIN UR ELPH-MCNC: 2.9 GM/DL
GLUCOSE SERPL-MCNC: 97 MG/DL (ref 65–99)
HCT VFR BLD AUTO: 45.5 % (ref 34–46.6)
HDLC SERPL-MCNC: 74 MG/DL (ref 40–60)
HGB BLD-MCNC: 15 G/DL (ref 12–15.9)
IMM GRANULOCYTES # BLD AUTO: 0.02 10*3/MM3 (ref 0–0.05)
IMM GRANULOCYTES NFR BLD AUTO: 0.3 % (ref 0–0.5)
LDLC SERPL CALC-MCNC: 54 MG/DL (ref 0–100)
LDLC/HDLC SERPL: 0.69 {RATIO}
LYMPHOCYTES # BLD AUTO: 1.43 10*3/MM3 (ref 0.7–3.1)
LYMPHOCYTES NFR BLD AUTO: 21.4 % (ref 19.6–45.3)
MCH RBC QN AUTO: 28.7 PG (ref 26.6–33)
MCHC RBC AUTO-ENTMCNC: 33 G/DL (ref 31.5–35.7)
MCV RBC AUTO: 87.2 FL (ref 79–97)
MONOCYTES # BLD AUTO: 0.46 10*3/MM3 (ref 0.1–0.9)
MONOCYTES NFR BLD AUTO: 6.9 % (ref 5–12)
NEUTROPHILS NFR BLD AUTO: 4.64 10*3/MM3 (ref 1.7–7)
NEUTROPHILS NFR BLD AUTO: 69.3 % (ref 42.7–76)
NRBC BLD AUTO-RTO: 0 /100 WBC (ref 0–0.2)
PLATELET # BLD AUTO: 211 10*3/MM3 (ref 140–450)
PMV BLD AUTO: 11.5 FL (ref 6–12)
POTASSIUM SERPL-SCNC: 4.1 MMOL/L (ref 3.5–5.2)
PROT SERPL-MCNC: 7.3 G/DL (ref 6–8.5)
RBC # BLD AUTO: 5.22 10*6/MM3 (ref 3.77–5.28)
SODIUM SERPL-SCNC: 142 MMOL/L (ref 136–145)
T4 FREE SERPL-MCNC: 1.93 NG/DL (ref 0.93–1.7)
TRIGL SERPL-MCNC: 119 MG/DL (ref 0–150)
TSH SERPL DL<=0.05 MIU/L-ACNC: 0.83 UIU/ML (ref 0.27–4.2)
VLDLC SERPL-MCNC: 21 MG/DL (ref 5–40)
WBC NRBC COR # BLD AUTO: 6.69 10*3/MM3 (ref 3.4–10.8)

## 2024-02-27 PROCEDURE — 84443 ASSAY THYROID STIM HORMONE: CPT | Performed by: NURSE PRACTITIONER

## 2024-02-27 PROCEDURE — 84439 ASSAY OF FREE THYROXINE: CPT | Performed by: NURSE PRACTITIONER

## 2024-02-27 PROCEDURE — 80061 LIPID PANEL: CPT | Performed by: NURSE PRACTITIONER

## 2024-02-27 PROCEDURE — 80053 COMPREHEN METABOLIC PANEL: CPT | Performed by: NURSE PRACTITIONER

## 2024-02-27 PROCEDURE — 85025 COMPLETE CBC W/AUTO DIFF WBC: CPT | Performed by: NURSE PRACTITIONER

## 2024-02-27 RX ORDER — METOPROLOL SUCCINATE 25 MG/1
25 TABLET, EXTENDED RELEASE ORAL DAILY
Qty: 90 TABLET | Refills: 1 | Status: SHIPPED | OUTPATIENT
Start: 2024-02-27

## 2024-02-27 RX ORDER — ATORVASTATIN CALCIUM 40 MG/1
40 TABLET, FILM COATED ORAL NIGHTLY
Qty: 90 TABLET | Refills: 1 | Status: SHIPPED | OUTPATIENT
Start: 2024-02-27

## 2024-02-27 RX ORDER — LEVOTHYROXINE SODIUM 0.12 MG/1
125 TABLET ORAL DAILY
Qty: 90 TABLET | Refills: 1 | Status: SHIPPED | OUTPATIENT
Start: 2024-02-27

## 2024-02-27 RX ORDER — SUMATRIPTAN 100 MG/1
100 TABLET, FILM COATED ORAL AS NEEDED
Qty: 9 TABLET | Refills: 5 | Status: SHIPPED | OUTPATIENT
Start: 2024-02-27

## 2024-02-27 RX ORDER — ALBUTEROL SULFATE 90 UG/1
2 AEROSOL, METERED RESPIRATORY (INHALATION) EVERY 4 HOURS PRN
Qty: 18 G | Refills: 2 | Status: SHIPPED | OUTPATIENT
Start: 2024-02-27

## 2024-02-27 RX ORDER — FLUTICASONE FUROATE AND VILANTEROL 100; 25 UG/1; UG/1
1 POWDER RESPIRATORY (INHALATION)
Qty: 28 EACH | Refills: 5 | Status: SHIPPED | OUTPATIENT
Start: 2024-02-27

## 2024-02-27 NOTE — PROGRESS NOTES
The ABCs of the Annual Wellness Visit  [place] to Medicare Visit    Subjective     Italia Beard is a 63 y.o. female who presents for a  Welcome to Medicare Visit.    The following portions of the patient's history were reviewed and   updated as appropriate: allergies, current medications, past family history, past medical history, past social history, past surgical history, and problem list.     Compared to one year ago, the patient feels her physical   health is the same.    Compared to one year ago, the patient feels her mental   health is the same.    Recent Hospitalizations:  She was not admitted to the hospital during the last year.       Current Medical Providers:  Patient Care Team:  Cherelle Jones APRN as PCP - General (Nurse Practitioner)  Chan Chaparro MD as Consulting Physician (General Surgery)    Outpatient Medications Prior to Visit   Medication Sig Dispense Refill    alendronate (Fosamax) 70 MG tablet Take 1 tablet by mouth Every 7 (Seven) Days. 13 tablet 1    aspirin 81 MG chewable tablet Chew 1 tablet Every Night.      buPROPion SR (Wellbutrin SR) 150 MG 12 hr tablet Take 1 tablet by mouth Daily for 7 days, THEN 1 tablet 2 (Two) Times a Day for 90 days. 60 tablet 3    cyanocobalamin (VITAMIN B-12) 1000 MCG tablet Take 1 tablet by mouth Daily.      ferrous sulfate 325 (65 FE) MG tablet Take 1 tablet by mouth Daily With Breakfast.      fluticasone (FLONASE) 50 MCG/ACT nasal spray 2 sprays into the nostril(s) as directed by provider Daily. 16 g 5    ipratropium-albuterol (DUO-NEB) 0.5-2.5 mg/3 ml nebulizer Take 3 mL by nebulization 2 (Two) Times a Day. And as needed 1080 mL 5    loratadine (Claritin) 10 MG tablet Take 1 tablet by mouth Daily. 30 tablet 5    albuterol sulfate  (90 Base) MCG/ACT inhaler Inhale 2 puffs Every 4 (Four) Hours As Needed for Wheezing. 18 g 0    atorvastatin (LIPITOR) 40 MG tablet TAKE 1 TABLET BY MOUTH ONCE DAILY AT NIGHT 90 tablet 0    Fluticasone  "Furoate-Vilanterol (Breo Ellipta) 100-25 MCG/ACT aerosol powder  Inhale 1 puff Daily. 28 each 5    levothyroxine (SYNTHROID, LEVOTHROID) 125 MCG tablet Take 1 tablet by mouth once daily 90 tablet 0    metoprolol succinate XL (Toprol XL) 25 MG 24 hr tablet Take 1 tablet by mouth Daily. 90 tablet 1    SUMAtriptan (IMITREX) 100 MG tablet Take 1 tablet by mouth As Needed for Migraine. 9 tablet 5    Nutritional Supplements (MENOPAUSE FORMULA PO) Take  by mouth. (Patient not taking: Reported on 2/27/2024)      guaiFENesin (MUCINEX) 600 MG 12 hr tablet Take 2 tablets by mouth 2 (Two) Times a Day. (Patient not taking: Reported on 2/27/2024)       No facility-administered medications prior to visit.       No opioid medication identified on active medication list. I have reviewed chart for other potential  high risk medication/s and harmful drug interactions in the elderly.        Aspirin is on active medication list. Aspirin use is indicated based on review of current medical condition/s. Pros and cons of this therapy have been discussed today. Benefits of this medication outweigh potential harm.  Patient has been encouraged to continue taking this medication.  .      Patient Active Problem List   Diagnosis    COPD (chronic obstructive pulmonary disease)    Hyperlipidemia    Hypothyroidism    Nicotine dependence    Seasonal allergic rhinitis    Depression    Pacemaker    History of complete AV block    Migraine    Thyroid disorder    SSS (sick sinus syndrome)    Colon cancer screening    Dysfunction of left eustachian tube     Advance Care Planning   Advance Care Planning     Advance Directive is not on file.  ACP discussion was declined by the patient. Patient does not have an advance directive, information provided.       Objective   Vitals:    02/27/24 0901 02/27/24 0910   BP: 154/92 142/90   Pulse: 75    Resp: 24    Temp: 97.3 °F (36.3 °C)    SpO2: 94%    Weight: 77.6 kg (171 lb)    Height: 162.6 cm (64\")      Estimated " "body mass index is 29.35 kg/m² as calculated from the following:    Height as of this encounter: 162.6 cm (64\").    Weight as of this encounter: 77.6 kg (171 lb).           Does the patient have evidence of cognitive impairment?   No                HEALTH RISK ASSESSMENT    Smoking Status:  Social History     Tobacco Use   Smoking Status Every Day    Packs/day: 1.50    Years: 40.00    Additional pack years: 0.00    Total pack years: 60.00    Types: Cigarettes   Smokeless Tobacco Never     Alcohol Consumption:  Social History     Substance and Sexual Activity   Alcohol Use Never       Fall Risk Screen:    STEADI Fall Risk Assessment was completed, and patient is at LOW risk for falls.Assessment completed on:2024    Depression Screen:       2024     9:07 AM   PHQ-2/PHQ-9 Depression Screening   Little Interest or Pleasure in Doing Things 0-->not at all   Feeling Down, Depressed or Hopeless 0-->not at all   PHQ-9: Brief Depression Severity Measure Score 0       Health Habits and Functional and Cognitive Screenin/27/2024     9:05 AM   Functional & Cognitive Status   Do you have difficulty preparing food and eating? No   Do you have difficulty bathing yourself, getting dressed or grooming yourself? No   Do you have difficulty using the toilet? No   Do you have difficulty moving around from place to place? No   Do you have trouble with steps or getting out of a bed or a chair? No   Current Diet Well Balanced Diet   Dental Exam Not up to date        Dental Exam Comment appt tomorrow getting dentures   Eye Exam Not up to date   Exercise (times per week) 0 times per week   Current Exercises Include No Regular Exercise   Do you need help using the phone?  No   Are you deaf or do you have serious difficulty hearing?  No   Do you need help to go to places out of walking distance? No   Do you need help shopping? No   Do you need help preparing meals?  No   Do you need help with housework?  No   Do you need help " with laundry? No   Do you need help taking your medications? No   Do you need help managing money? No   Do you ever drive or ride in a car without wearing a seat belt? No   Have you felt unusual stress, anger or loneliness in the last month? No   Who do you live with? Spouse   If you need help, do you have trouble finding someone available to you? No   Have you been bothered in the last four weeks by sexual problems? No   Do you have difficulty concentrating, remembering or making decisions? No       Visual Acuity:    Vision Screening    Right eye Left eye Both eyes   Without correction      With correction 20 20 20 20 20 20       Age-appropriate Screening Schedule:  Refer to the list below for future screening recommendations based on patient's age, sex and/or medical conditions. Orders for these recommended tests are listed in the plan section. The patient has been provided with a written plan.    Health Maintenance   Topic Date Due    ZOSTER VACCINE (1 of 2) 02/27/2024 (Originally 9/13/2011)    INFLUENZA VACCINE  03/31/2024 (Originally 8/1/2023)    PAP SMEAR  04/20/2024 (Originally 9/24/2022)    Pneumococcal Vaccine 0-64 (1 of 2 - PCV) 08/07/2024 (Originally 9[birth year]/1967)    COVID-19 Vaccine (1) 11/25/2024 (Originally 3/13/1961)    TDAP/TD VACCINES (1 - Tdap) 11/27/2024 (Originally 9/13/1980)    RSV Vaccine - Adults (1 - 1-dose 60+ series) 02/27/2025 (Originally 9/13/2021)    LIPID PANEL  08/07/2024    BMI FOLLOWUP  08/07/2024    LUNG CANCER SCREENING  09/06/2024    ANNUAL WELLNESS VISIT  02/27/2025    MAMMOGRAM  01/12/2026    COLORECTAL CANCER SCREENING  03/20/2028    HEPATITIS C SCREENING  Completed        CMS Preventative Services Quick Reference  Risk Factors Identified During Encounter    Fall Risk-High or Moderate: Discussed Fall Prevention in the home  Immunizations Discussed/Encouraged: Tdap, Prevnar 20 (Pneumococcal 20-valent conjugate), Shingrix, COVID19, and RSV (Respiratory Syncytial Virus)  Dental  Screening Recommended  Vision Screening Recommended  The above risks/problems have been discussed with the patient.  Pertinent information has been shared with the patient in the After Visit Summary.  Italia Beard  reports that she has been smoking cigarettes. She has a 60.00 pack-year smoking history. She has never used smokeless tobacco.. I have educated her on the risk of diseases from using tobacco products such as cancer, COPD, and heart disease.     I advised her to quit and she is not willing to quit.    I spent 3  minutes counseling the patient.         Follow Up:   Initial Medicare Visit in one year    An After Visit Summary and PPPS were made available to the patient.      Additional E&M Note during same encounter follows:  Patient has multiple medical problems which are significant and separately identifiable that require additional work above and beyond the Medicare Wellness Visit.      Chief Complaint  Welcome To Medicare, Hypertension, Hypothyroidism, and Hyperlipidemia    Subjective        HPI  Italia Beard is also being seen today for   She is fasting for labs today.  She has been taking her Synthroid daily with no issues noted.  LIPID:  She is taking her Lipitor daily.  NO leg issues.  She is not having any chest pain or shortness of breath noted.  She does currently smoke and is not ready to stop smoking. She would like an inhaler regularly even her albuterol.       Migraine:  She has been taking her imitrex when needed.  She will take advil and that does calm the headache.    She has a pacemaker and does see cardiology 4/2024Review of Systems   Constitutional:  Negative for fatigue.   Respiratory:  Negative for cough and shortness of breath.    Cardiovascular:  Negative for chest pain and leg swelling.   Gastrointestinal:  Negative for nausea and vomiting.   Psychiatric/Behavioral:  Negative for hallucinations and suicidal ideas.        Objective   Vital Signs:  /90    "Pulse 75   Temp 97.3 °F (36.3 °C)   Resp 24   Ht 162.6 cm (64\")   Wt 77.6 kg (171 lb)   SpO2 94%   BMI 29.35 kg/m²     Physical Exam  Vitals reviewed.   Constitutional:       Appearance: Normal appearance. She is well-developed.   Cardiovascular:      Rate and Rhythm: Normal rate and regular rhythm.      Heart sounds: Normal heart sounds. No murmur heard.  Pulmonary:      Effort: Pulmonary effort is normal.      Breath sounds: Normal breath sounds. No wheezing or rhonchi.   Musculoskeletal:      Right lower leg: No edema.      Left lower leg: No edema.   Neurological:      Mental Status: She is alert and oriented to person, place, and time.      Cranial Nerves: No cranial nerve deficit.      Motor: No weakness.   Psychiatric:         Mood and Affect: Mood and affect normal.          The following data was reviewed by: FABIAN Mendez on 02/27/2024:  Common labs          8/7/2023    08:15   Common Labs   Glucose 91    BUN 15    Creatinine 1.20    Sodium 142    Potassium 4.2    Chloride 102    Calcium 9.9    Albumin 4.4    Total Bilirubin 0.6    Alkaline Phosphatase 129    AST (SGOT) 16    ALT (SGPT) 13    WBC 6.09    Hemoglobin 15.7    Hematocrit 46.3    Platelets 186    Total Cholesterol 137    Triglycerides 108    HDL Cholesterol 67    LDL Cholesterol  51              Assessment and Plan   Diagnoses and all orders for this visit:    1. Welcome to Medicare preventive visit (Primary)    2. Other migraine without status migrainosus, intractable  -     SUMAtriptan (IMITREX) 100 MG tablet; Take 1 tablet by mouth As Needed for Migraine.  Dispense: 9 tablet; Refill: 5    3. Primary hypertension  -     metoprolol succinate XL (Toprol XL) 25 MG 24 hr tablet; Take 1 tablet by mouth Daily.  Dispense: 90 tablet; Refill: 1  -     albuterol sulfate  (90 Base) MCG/ACT inhaler; Inhale 2 puffs Every 4 (Four) Hours As Needed for Wheezing.  Dispense: 18 g; Refill: 2  -     Comprehensive Metabolic Panel  -     " CBC & Differential  -     TSH  -     Lipid Panel    4. Acquired hypothyroidism  -     levothyroxine (SYNTHROID, LEVOTHROID) 125 MCG tablet; Take 1 tablet by mouth Daily.  Dispense: 90 tablet; Refill: 1  -     T4, Free    5. Chronic obstructive pulmonary disease, unspecified COPD type  -     albuterol sulfate  (90 Base) MCG/ACT inhaler; Inhale 2 puffs Every 4 (Four) Hours As Needed for Wheezing.  Dispense: 18 g; Refill: 2  -     Fluticasone Furoate-Vilanterol (Breo Ellipta) 100-25 MCG/ACT aerosol powder ; Inhale 1 puff Daily.  Dispense: 28 each; Refill: 5    6. Cigarette nicotine dependence without complication  -     Fluticasone Furoate-Vilanterol (Breo Ellipta) 100-25 MCG/ACT aerosol powder ; Inhale 1 puff Daily.  Dispense: 28 each; Refill: 5    7. Mixed hyperlipidemia  -     atorvastatin (LIPITOR) 40 MG tablet; Take 1 tablet by mouth Every Night.  Dispense: 90 tablet; Refill: 1             Follow Up   Return in about 6 months (around 8/27/2024).  Patient was given instructions and counseling regarding her condition or for health maintenance advice. Please see specific information pulled into the AVS if appropriate.   Follow-up in 6 months for labs and appt. Call with any concerns or questions that you may have regarding your medications or history.    I have reviewed all medications and at this time no medications changes need to be adjusted for all chronic conditions.    Cherelle Jones, FABIAN  02/27/2024

## 2024-04-17 ENCOUNTER — CLINICAL SUPPORT NO REQUIREMENTS (OUTPATIENT)
Dept: CARDIOLOGY | Facility: CLINIC | Age: 64
End: 2024-04-17
Payer: MEDICARE

## 2024-04-17 ENCOUNTER — OFFICE VISIT (OUTPATIENT)
Dept: CARDIOLOGY | Facility: CLINIC | Age: 64
End: 2024-04-17
Payer: MEDICARE

## 2024-04-17 VITALS
SYSTOLIC BLOOD PRESSURE: 138 MMHG | HEART RATE: 82 BPM | HEIGHT: 64 IN | BODY MASS INDEX: 29.71 KG/M2 | WEIGHT: 174 LBS | DIASTOLIC BLOOD PRESSURE: 78 MMHG

## 2024-04-17 DIAGNOSIS — I49.5 SSS (SICK SINUS SYNDROME): Primary | ICD-10-CM

## 2024-04-17 DIAGNOSIS — E78.2 MIXED HYPERLIPIDEMIA: ICD-10-CM

## 2024-04-17 DIAGNOSIS — Z95.0 PACEMAKER: ICD-10-CM

## 2024-04-17 PROCEDURE — 1160F RVW MEDS BY RX/DR IN RCRD: CPT | Performed by: INTERNAL MEDICINE

## 2024-04-17 PROCEDURE — 1159F MED LIST DOCD IN RCRD: CPT | Performed by: INTERNAL MEDICINE

## 2024-04-17 PROCEDURE — 99214 OFFICE O/P EST MOD 30 MIN: CPT | Performed by: INTERNAL MEDICINE

## 2024-04-17 NOTE — PROGRESS NOTES
Chief Complaint  Hypertension, pacemaker , and av block     Subjective            Italiacarol Beard presents to [facility] CARDIOLOGY    Italia is here for follow-up evaluation management of high-grade AV block, dual-chamber permanent pacemaker, essential hypertension, smoking.  Since last visit she is doing relatively well.  She is fairly sedentary.  No chest pain or palpitations.    PMH  Past Medical History:   Diagnosis Date    Anemia     B12 deficiency     COPD (chronic obstructive pulmonary disease)     Heart murmur     Hyperlipidemia     Hypertension     Hypothyroidism     Migraines     Positive colorectal cancer screening using Cologuard test          SURGICALHX  Past Surgical History:   Procedure Laterality Date    APPENDECTOMY      CHOLECYSTECTOMY      COLONOSCOPY N/A 3/20/2023    Procedure: COLONOSCOPY with forcep polypectomy;  Surgeon: Chan Chaparro MD;  Location: [facility] ENDOSCOPY;  Service: General;  Laterality: N/A;  colon polyp    PACEMAKER IMPLANTATION      TUBAL ABDOMINAL LIGATION          SOC  Social History     Socioeconomic History    Marital status:    Tobacco Use    Smoking status: Every Day     Current packs/day: 1.50     Average packs/day: 1.5 packs/day for 40.0 years (60.0 ttl pk-yrs)     Types: Cigarettes    Smokeless tobacco: Never   Vaping Use    Vaping status: Never Used   Substance and Sexual Activity    Alcohol use: Never    Drug use: Never    Sexual activity: Yes     Partners: Male     Birth control/protection: Post-menopausal         FAMHX  Family History   Problem Relation Age of Onset    No Known Problems Mother     Heart attack Father     Malig Hyperthermia Neg Hx           ALLERGY  Allergies   Allergen Reactions    Penicillins Hives        MEDSCURRENT    Current Outpatient Medications:     albuterol sulfate  (90 Base) MCG/ACT inhaler, Inhale 2 puffs Every 4 (Four) Hours As Needed for Wheezing., Disp: 18 g, Rfl: 2    alendronate (Fosamax)  "70 MG tablet, Take 1 tablet by mouth Every 7 (Seven) Days., Disp: 13 tablet, Rfl: 1    aspirin 81 MG chewable tablet, Chew 1 tablet Every Night., Disp: , Rfl:     atorvastatin (LIPITOR) 40 MG tablet, Take 1 tablet by mouth Every Night., Disp: 90 tablet, Rfl: 1    cyanocobalamin (VITAMIN B-12) 1000 MCG tablet, Take 1 tablet by mouth Daily., Disp: , Rfl:     ferrous sulfate 325 (65 FE) MG tablet, Take 1 tablet by mouth Daily With Breakfast., Disp: , Rfl:     fluticasone (FLONASE) 50 MCG/ACT nasal spray, 2 sprays into the nostril(s) as directed by provider Daily., Disp: 16 g, Rfl: 5    Fluticasone Furoate-Vilanterol (Breo Ellipta) 100-25 MCG/ACT aerosol powder , Inhale 1 puff Daily., Disp: 28 each, Rfl: 5    ipratropium-albuterol (DUO-NEB) 0.5-2.5 mg/3 ml nebulizer, Take 3 mL by nebulization 2 (Two) Times a Day. And as needed, Disp: 1080 mL, Rfl: 5    levothyroxine (SYNTHROID, LEVOTHROID) 125 MCG tablet, Take 1 tablet by mouth Daily., Disp: 90 tablet, Rfl: 1    loratadine (Claritin) 10 MG tablet, Take 1 tablet by mouth Daily., Disp: 30 tablet, Rfl: 5    metoprolol succinate XL (Toprol XL) 25 MG 24 hr tablet, Take 1 tablet by mouth Daily., Disp: 90 tablet, Rfl: 1    SUMAtriptan (IMITREX) 100 MG tablet, Take 1 tablet by mouth As Needed for Migraine., Disp: 9 tablet, Rfl: 5    buPROPion SR (Wellbutrin SR) 150 MG 12 hr tablet, Take 1 tablet by mouth Daily for 7 days, THEN 1 tablet 2 (Two) Times a Day for 90 days. (Patient not taking: Reported on 4/17/2024), Disp: 60 tablet, Rfl: 3    Nutritional Supplements (MENOPAUSE FORMULA PO), Take  by mouth. (Patient not taking: Reported on 2/27/2024), Disp: , Rfl:       Review of Systems   Cardiovascular:  Negative for chest pain.   Respiratory:  Positive for cough and shortness of breath.         Objective     /78   Pulse 82   Ht 162.6 cm (64\")   Wt 78.9 kg (174 lb)   BMI 29.87 kg/m²       General Appearance:   well developed  well nourished  HENT:   oropharynx moist  lips " not cyanotic  Neck:  thyroid not enlarged  supple  Respiratory:  no respiratory distress  normal breath sounds  no rales  Cardiovascular:  no jugular venous distention  regular rhythm  apical impulse normal  S1 normal, S2 normal  no S3, no S4   no murmur  no rub, no thrill  carotid pulses normal; no bruit  pedal pulses normal  lower extremity edema: none    Musculoskeletal:  no clubbing of fingers.   normocephalic, head atraumatic  Skin:   warm, dry  Psychiatric:  judgement and insight appropriate  normal mood and affect      Result Review :     The following data was reviewed by: Cain Artis MD on 04/20/2022:    CMP          8/7/2023    08:15 2/27/2024    09:27   CMP   Glucose 91  97    BUN 15  11    Creatinine 1.20  1.15    EGFR 51.6  54.0    Sodium 142  142    Potassium 4.2  4.1    Chloride 102  105    Calcium 9.9  10.1    Total Protein 7.0  7.3    Albumin 4.4  4.4    Globulin 2.6  2.9    Total Bilirubin 0.6  0.5    Alkaline Phosphatase 129  118    AST (SGOT) 16  19    ALT (SGPT) 13  19    Albumin/Globulin Ratio 1.7  1.5    BUN/Creatinine Ratio 12.5  9.6    Anion Gap 10.7  9.0      CBC          8/7/2023    08:15 2/27/2024    09:27   CBC   WBC 6.09  6.69    RBC 5.22  5.22    Hemoglobin 15.7  15.0    Hematocrit 46.3  45.5    MCV 88.7  87.2    MCH 30.1  28.7    MCHC 33.9  33.0    RDW 12.6  13.0    Platelets 186  211      Lipid Panel          8/7/2023    08:15 2/27/2024    09:27   Lipid Panel   Total Cholesterol 137  149    Triglycerides 108  119    HDL Cholesterol 67  74    VLDL Cholesterol 19  21    LDL Cholesterol  51  54    LDL/HDL Ratio 0.72  0.69      TSH          8/7/2023    08:15 2/27/2024    09:27   TSH   TSH 1.060  0.827        Data reviewed : Pacemaker interrogation today shows normal device function, 27% atrial pacing, 48% ventricular pacing, no significant arrhythmias, 4-year battery life      Procedures      Italia Gillilandworth  reports that she has been smoking cigarettes. She has a  60 pack-year smoking history. She has never used smokeless tobacco.. I have educated her on the risk of diseases from using tobacco products such as cancer, COPD and heart disease.     I advised her to quit and she is not currently willing to quit.    I spent 3  minutes counseling the patient.                Assessment and Plan        ASSESSMENT:  Encounter Diagnoses   Name Primary?    SSS (sick sinus syndrome) Yes    Pacemaker     Mixed hyperlipidemia          PLAN:    1.  Italia has sinus node dysfunction with AV block, it is stable with the dual-chamber permanent pacemaker.  The device is functioning normally.  Continue remote monitoring.  2.  Hypertension, controlled, continue beta-blocker  3.  Mixed hyperlipidemia-controlled, continue atorvastatin    Annual follow-up will be arranged otherwise, with a pacemaker check next visit          Patient was given instructions and counseling regarding her condition or for health maintenance advice. Please see specific information pulled into the AVS if appropriate.             HÉCTOR Artis MD  4/17/2024    09:17 EDT

## 2024-04-22 ENCOUNTER — APPOINTMENT (OUTPATIENT)
Dept: CT IMAGING | Facility: HOSPITAL | Age: 64
End: 2024-04-22
Payer: MEDICARE

## 2024-04-22 ENCOUNTER — HOSPITAL ENCOUNTER (EMERGENCY)
Facility: HOSPITAL | Age: 64
Discharge: HOME | End: 2024-04-22
Attending: EMERGENCY MEDICINE | Admitting: EMERGENCY MEDICINE
Payer: MEDICARE

## 2024-04-22 VITALS
TEMPERATURE: 98 F | BODY MASS INDEX: 30.59 KG/M2 | OXYGEN SATURATION: 95 % | RESPIRATION RATE: 24 BRPM | WEIGHT: 172.62 LBS | DIASTOLIC BLOOD PRESSURE: 70 MMHG | HEART RATE: 79 BPM | HEIGHT: 63 IN | SYSTOLIC BLOOD PRESSURE: 119 MMHG

## 2024-04-22 DIAGNOSIS — K52.9 COLITIS: ICD-10-CM

## 2024-04-22 DIAGNOSIS — R19.7 DIARRHEA OF PRESUMED INFECTIOUS ORIGIN: Primary | ICD-10-CM

## 2024-04-22 LAB
ALBUMIN SERPL-MCNC: 3.9 G/DL (ref 3.5–5.2)
ALBUMIN/GLOB SERPL: 1.3 G/DL
ALP SERPL-CCNC: 117 U/L (ref 39–117)
ALT SERPL W P-5'-P-CCNC: 20 U/L (ref 1–33)
ANION GAP SERPL CALCULATED.3IONS-SCNC: 15.4 MMOL/L (ref 5–15)
AST SERPL-CCNC: 21 U/L (ref 1–32)
BACTERIA UR QL AUTO: ABNORMAL /HPF
BASOPHILS # BLD AUTO: 0.05 10*3/MM3 (ref 0–0.2)
BASOPHILS NFR BLD AUTO: 0.5 % (ref 0–1.5)
BILIRUB SERPL-MCNC: 0.3 MG/DL (ref 0–1.2)
BILIRUB UR QL STRIP: ABNORMAL
BUN SERPL-MCNC: 14 MG/DL (ref 8–23)
BUN/CREAT SERPL: 10.6 (ref 7–25)
CALCIUM SPEC-SCNC: 9.4 MG/DL (ref 8.6–10.5)
CHLORIDE SERPL-SCNC: 97 MMOL/L (ref 98–107)
CLARITY UR: ABNORMAL
CO2 SERPL-SCNC: 22.6 MMOL/L (ref 22–29)
COLOR UR: ABNORMAL
CREAT SERPL-MCNC: 1.32 MG/DL (ref 0.57–1)
D-LACTATE SERPL-SCNC: 1.3 MMOL/L (ref 0.5–2)
DEPRECATED RDW RBC AUTO: 41.9 FL (ref 37–54)
EGFRCR SERPLBLD CKD-EPI 2021: 45.7 ML/MIN/1.73
EOSINOPHIL # BLD AUTO: 0.12 10*3/MM3 (ref 0–0.4)
EOSINOPHIL NFR BLD AUTO: 1.1 % (ref 0.3–6.2)
ERYTHROCYTE [DISTWIDTH] IN BLOOD BY AUTOMATED COUNT: 12.6 % (ref 12.3–15.4)
FLUAV SUBTYP SPEC NAA+PROBE: NOT DETECTED
FLUBV RNA ISLT QL NAA+PROBE: NOT DETECTED
GLOBULIN UR ELPH-MCNC: 2.9 GM/DL
GLUCOSE SERPL-MCNC: 119 MG/DL (ref 65–99)
GLUCOSE UR STRIP-MCNC: NEGATIVE MG/DL
HCT VFR BLD AUTO: 48.2 % (ref 34–46.6)
HGB BLD-MCNC: 15.5 G/DL (ref 12–15.9)
HGB UR QL STRIP.AUTO: NEGATIVE
HOLD SPECIMEN: NORMAL
HOLD SPECIMEN: NORMAL
HYALINE CASTS UR QL AUTO: ABNORMAL /LPF
IMM GRANULOCYTES # BLD AUTO: 0.04 10*3/MM3 (ref 0–0.05)
IMM GRANULOCYTES NFR BLD AUTO: 0.4 % (ref 0–0.5)
KETONES UR QL STRIP: ABNORMAL
LEUKOCYTE ESTERASE UR QL STRIP.AUTO: ABNORMAL
LIPASE SERPL-CCNC: 15 U/L (ref 13–60)
LYMPHOCYTES # BLD AUTO: 0.59 10*3/MM3 (ref 0.7–3.1)
LYMPHOCYTES NFR BLD AUTO: 5.3 % (ref 19.6–45.3)
MCH RBC QN AUTO: 29.1 PG (ref 26.6–33)
MCHC RBC AUTO-ENTMCNC: 32.2 G/DL (ref 31.5–35.7)
MCV RBC AUTO: 90.4 FL (ref 79–97)
MONOCYTES # BLD AUTO: 0.74 10*3/MM3 (ref 0.1–0.9)
MONOCYTES NFR BLD AUTO: 6.7 % (ref 5–12)
NEUTROPHILS NFR BLD AUTO: 86 % (ref 42.7–76)
NEUTROPHILS NFR BLD AUTO: 9.49 10*3/MM3 (ref 1.7–7)
NITRITE UR QL STRIP: NEGATIVE
NRBC BLD AUTO-RTO: 0 /100 WBC (ref 0–0.2)
PH UR STRIP.AUTO: 5.5 [PH] (ref 5–8)
PLATELET # BLD AUTO: 163 10*3/MM3 (ref 140–450)
PMV BLD AUTO: 10.7 FL (ref 6–12)
POTASSIUM SERPL-SCNC: 3.5 MMOL/L (ref 3.5–5.2)
PROT SERPL-MCNC: 6.8 G/DL (ref 6–8.5)
PROT UR QL STRIP: ABNORMAL
RBC # BLD AUTO: 5.33 10*6/MM3 (ref 3.77–5.28)
RBC # UR STRIP: ABNORMAL /HPF
REF LAB TEST METHOD: ABNORMAL
RSV RNA NPH QL NAA+NON-PROBE: NOT DETECTED
SARS-COV-2 RNA RESP QL NAA+PROBE: NOT DETECTED
SODIUM SERPL-SCNC: 135 MMOL/L (ref 136–145)
SP GR UR STRIP: >1.03 (ref 1–1.03)
SQUAMOUS #/AREA URNS HPF: ABNORMAL /HPF
UROBILINOGEN UR QL STRIP: ABNORMAL
WBC # UR STRIP: ABNORMAL /HPF
WBC NRBC COR # BLD AUTO: 11.03 10*3/MM3 (ref 3.4–10.8)
WHOLE BLOOD HOLD COAG: NORMAL
WHOLE BLOOD HOLD SPECIMEN: NORMAL
YEAST URNS QL MICRO: ABNORMAL /HPF

## 2024-04-22 PROCEDURE — 99285 EMERGENCY DEPT VISIT HI MDM: CPT

## 2024-04-22 PROCEDURE — 25510000001 IOPAMIDOL PER 1 ML: Performed by: EMERGENCY MEDICINE

## 2024-04-22 PROCEDURE — 83605 ASSAY OF LACTIC ACID: CPT | Performed by: EMERGENCY MEDICINE

## 2024-04-22 PROCEDURE — 81001 URINALYSIS AUTO W/SCOPE: CPT | Performed by: EMERGENCY MEDICINE

## 2024-04-22 PROCEDURE — 96374 THER/PROPH/DIAG INJ IV PUSH: CPT

## 2024-04-22 PROCEDURE — 83690 ASSAY OF LIPASE: CPT | Performed by: EMERGENCY MEDICINE

## 2024-04-22 PROCEDURE — 87637 SARSCOV2&INF A&B&RSV AMP PRB: CPT

## 2024-04-22 PROCEDURE — 25010000002 ONDANSETRON PER 1 MG

## 2024-04-22 PROCEDURE — 80053 COMPREHEN METABOLIC PANEL: CPT | Performed by: EMERGENCY MEDICINE

## 2024-04-22 PROCEDURE — 85025 COMPLETE CBC W/AUTO DIFF WBC: CPT | Performed by: EMERGENCY MEDICINE

## 2024-04-22 PROCEDURE — 74177 CT ABD & PELVIS W/CONTRAST: CPT

## 2024-04-22 PROCEDURE — 25810000003 SODIUM CHLORIDE 0.9 % SOLUTION

## 2024-04-22 PROCEDURE — 87505 NFCT AGENT DETECTION GI: CPT

## 2024-04-22 RX ORDER — SODIUM CHLORIDE 0.9 % (FLUSH) 0.9 %
10 SYRINGE (ML) INJECTION AS NEEDED
Status: DISCONTINUED | OUTPATIENT
Start: 2024-04-22 | End: 2024-04-22 | Stop reason: HOSPADM

## 2024-04-22 RX ORDER — CIPROFLOXACIN 500 MG/1
500 TABLET, FILM COATED ORAL 2 TIMES DAILY
Qty: 14 TABLET | Refills: 0 | Status: SHIPPED | OUTPATIENT
Start: 2024-04-22 | End: 2024-04-29

## 2024-04-22 RX ORDER — ONDANSETRON 4 MG/1
4 TABLET, ORALLY DISINTEGRATING ORAL 4 TIMES DAILY PRN
Qty: 20 TABLET | Refills: 0 | Status: SHIPPED | OUTPATIENT
Start: 2024-04-22

## 2024-04-22 RX ORDER — ONDANSETRON 2 MG/ML
4 INJECTION INTRAMUSCULAR; INTRAVENOUS ONCE
Status: COMPLETED | OUTPATIENT
Start: 2024-04-22 | End: 2024-04-22

## 2024-04-22 RX ORDER — METRONIDAZOLE 500 MG/1
500 TABLET ORAL 3 TIMES DAILY
Qty: 21 TABLET | Refills: 0 | Status: SHIPPED | OUTPATIENT
Start: 2024-04-22 | End: 2024-04-29

## 2024-04-22 RX ADMIN — SODIUM CHLORIDE 1000 ML: 9 INJECTION, SOLUTION INTRAVENOUS at 08:32

## 2024-04-22 RX ADMIN — ONDANSETRON 4 MG: 2 INJECTION INTRAMUSCULAR; INTRAVENOUS at 08:35

## 2024-04-22 RX ADMIN — IOPAMIDOL 100 ML: 755 INJECTION, SOLUTION INTRAVENOUS at 09:26

## 2024-04-22 NOTE — DISCHARGE INSTRUCTIONS
Increase the amount of clear fluids to help with hydration.  Eat frequent, small meals throughout the day, avoiding foods that are heavy, greasy, or spicy.  Continue taking the antibiotics until the entire course is finished.  Use the Zofran to help with your nausea.  Follow-up with your primary care provider.

## 2024-04-22 NOTE — ED PROVIDER NOTES
Time: 8:13 AM EDT  Date of encounter:  4/22/2024  Independent Historian/Clinical History and Information was obtained by:   Patient    History is limited by: N/A    Chief Complaint: Diarrhea      History of Present Illness:  Patient is a 63 y.o. year old female who presents to the emergency department for evaluation of diarrhea, vomiting, abdominal pain for the past 3 days.  Patient reports she was seen at [facility] urgent care yesterday and was diagnosed with a urinary tract infection, was started on Bactrim, however patient has not been able to tolerate eating or drinking or taking medications.  Patient denies fevers or chills.    HPI    Patient Care Team  Primary Care Provider: Cherelle Jones APRN    Past Medical History:     Allergies   Allergen Reactions    Penicillins Hives     Past Medical History:   Diagnosis Date    Anemia     B12 deficiency     COPD (chronic obstructive pulmonary disease)     Heart murmur     Hyperlipidemia     Hypertension     Hypothyroidism     Migraines     Positive colorectal cancer screening using Cologuard test      Past Surgical History:   Procedure Laterality Date    APPENDECTOMY      CHOLECYSTECTOMY      COLONOSCOPY N/A 3/20/2023    Procedure: COLONOSCOPY with forcep polypectomy;  Surgeon: Chan Chaparro MD;  Location: [facility] ENDOSCOPY;  Service: General;  Laterality: N/A;  colon polyp    PACEMAKER IMPLANTATION      TUBAL ABDOMINAL LIGATION       Family History   Problem Relation Age of Onset    No Known Problems Mother     Heart attack Father     Malig Hyperthermia Neg Hx        Home Medications:  Prior to Admission medications    Medication Sig Start Date End Date Taking? Authorizing Provider   albuterol sulfate  (90 Base) MCG/ACT inhaler Inhale 2 puffs Every 4 (Four) Hours As Needed for Wheezing. 2/27/24   Cherelle Jones APRN   alendronate (Fosamax) 70 MG tablet Take 1 tablet by mouth Every 7 (Seven) Days. 1/15/24   Cherelle Jones APRN   aspirin  81 MG chewable tablet Chew 1 tablet Every Night.    ProviderChirag MD   atorvastatin (LIPITOR) 40 MG tablet Take 1 tablet by mouth Every Night. 2/27/24   Cherelle Jones APRN   buPROPion SR (Wellbutrin SR) 150 MG 12 hr tablet Take 1 tablet by mouth Daily for 7 days, THEN 1 tablet 2 (Two) Times a Day for 90 days.  Patient not taking: Reported on 4/17/2024 11/27/23 3/3/24  Nay Agarwal APRN   cyanocobalamin (VITAMIN B-12) 1000 MCG tablet Take 1 tablet by mouth Daily. 1/7/21   ProviderChirag MD   ferrous sulfate 325 (65 FE) MG tablet Take 1 tablet by mouth Daily With Breakfast.    ProviderChirag MD   fluticasone (FLONASE) 50 MCG/ACT nasal spray 2 sprays into the nostril(s) as directed by provider Daily. 11/27/23   Nay Agarwal APRN   Fluticasone Furoate-Vilanterol (Breo Ellipta) 100-25 MCG/ACT aerosol powder  Inhale 1 puff Daily. 2/27/24   Cherelle Jones APRN   ipratropium-albuterol (DUO-NEB) 0.5-2.5 mg/3 ml nebulizer Take 3 mL by nebulization 2 (Two) Times a Day. And as needed 11/27/23   Nay Agarwal APRN   levothyroxine (SYNTHROID, LEVOTHROID) 125 MCG tablet Take 1 tablet by mouth Daily. 2/27/24   Cherelle Jones APRN   loratadine (Claritin) 10 MG tablet Take 1 tablet by mouth Daily. 11/27/23   Nay Agarwal APRN   metoprolol succinate XL (Toprol XL) 25 MG 24 hr tablet Take 1 tablet by mouth Daily. 2/27/24   Cherelle Jones APRN   Nutritional Supplements (MENOPAUSE FORMULA PO) Take  by mouth.  Patient not taking: Reported on 2/27/2024    Chirag Leyva MD   SUMAtriptan (IMITREX) 100 MG tablet Take 1 tablet by mouth As Needed for Migraine. 2/27/24   Cherelle Jones APRN        Social History:   Social History     Tobacco Use    Smoking status: Every Day     Current packs/day: 1.50     Average packs/day: 1.5 packs/day for 40.0 years (60.0 ttl pk-yrs)     Types: Cigarettes    Smokeless tobacco: Never   Vaping Use    Vaping  "status: Never Used   Substance Use Topics    Alcohol use: Never    Drug use: Never         Review of Systems:  Review of Systems   Constitutional:  Negative for fever.   HENT:  Negative for sore throat.    Eyes: Negative.    Respiratory:  Negative for cough and shortness of breath.    Cardiovascular:  Negative for chest pain.   Gastrointestinal:  Positive for abdominal pain, diarrhea, nausea and vomiting.   Genitourinary:  Negative for dysuria.   Musculoskeletal:  Negative for neck pain.   Skin:  Negative for rash.   Allergic/Immunologic: Negative.    Neurological:  Negative for weakness, numbness and headaches.   Hematological: Negative.    Psychiatric/Behavioral: Negative.     All other systems reviewed and are negative.       Physical Exam:  /79   Pulse 101   Temp 97.7 °F (36.5 °C) (Oral)   Resp 24   Ht 160 cm (63\")   Wt 78.3 kg (172 lb 9.9 oz)   SpO2 97%   BMI 30.58 kg/m²     Physical Exam  Vitals and nursing note reviewed.   Constitutional:       General: She is not in acute distress.     Appearance: Normal appearance. She is not toxic-appearing.   HENT:      Head: Normocephalic and atraumatic.      Jaw: There is normal jaw occlusion.   Eyes:      General: Lids are normal.      Extraocular Movements: Extraocular movements intact.      Conjunctiva/sclera: Conjunctivae normal.      Pupils: Pupils are equal, round, and reactive to light.   Cardiovascular:      Rate and Rhythm: Normal rate and regular rhythm.      Pulses: Normal pulses.      Heart sounds: Normal heart sounds.   Pulmonary:      Effort: Pulmonary effort is normal. No respiratory distress.      Breath sounds: Normal breath sounds. No wheezing or rhonchi.   Abdominal:      General: Abdomen is flat.      Palpations: Abdomen is soft.      Tenderness: There is generalized abdominal tenderness. There is no guarding or rebound.   Musculoskeletal:         General: Normal range of motion.      Cervical back: Normal range of motion and neck " supple.      Right lower leg: No edema.      Left lower leg: No edema.   Skin:     General: Skin is warm and dry.   Neurological:      Mental Status: She is alert and oriented to person, place, and time. Mental status is at baseline.   Psychiatric:         Mood and Affect: Mood normal.            Procedures:  Procedures      Medical Decision Making:      Comorbidities that affect care:    COPD, Hypertension, Thyroid Disease    External Notes reviewed:    Previous Clinic Note: Urgent care visit from yesterday where patient presented with complaints of diarrhea and vomiting, was diagnosed with UTI and was prescribed Bactrim      The following orders were placed and all results were independently analyzed by me:  Orders Placed This Encounter   Procedures    COVID-19, FLU A/B, RSV PCR 1 HR TAT - Swab, Nasopharynx    Enteric Bacterial Panel - Stool, Per Rectum    CT Abdomen Pelvis With Contrast    [facility] Draw    Comprehensive Metabolic Panel    Lipase    Urinalysis With Microscopic If Indicated (No Culture) - Urine, Clean Catch    Lactic Acid, Plasma    CBC Auto Differential    Urinalysis, Microscopic Only - Urine, Clean Catch    NPO Diet NPO Type: Strict NPO    Undress & Gown    Insert Peripheral IV    CBC & Differential    Green Top (Gel)    Lavender Top    Gold Top - SST    Light Blue Top       Medications Given in the Emergency Department:  Medications   sodium chloride 0.9 % flush 10 mL (has no administration in time range)   sodium chloride 0.9 % bolus 1,000 mL (1,000 mL Intravenous New Bag 4/22/24 0832)   ondansetron (ZOFRAN) injection 4 mg (4 mg Intravenous Given 4/22/24 0835)   iopamidol (ISOVUE-370) 76 % injection 100 mL (100 mL Intravenous Given 4/22/24 0926)        ED Course:         Labs:    Lab Results (last 24 hours)       Procedure Component Value Units Date/Time    POCT URINALYSIS DIPSTICK, AUTOMATED [[id number]]  (Abnormal) Collected: 04/21/24 1213    Specimen: Urine Updated: 04/21/24 1227     Color, UA  Orange     Appearance, Fluid Turbid     Specific Gravity, UA 1.015     pH, UA 5     Leukocytes,  Poncho/ul     Nitrite, UA Positive     Total Protein, urine 30 mg/dL     Glucose Normal     External Ketones, Urine 15 mg/dL     Urobilinogen, UA 1 mg/dL mg/dL      External Bilirubin, Urine 1 mg/dL     Blood, UA 50 Reji/uL     QC Acceptable     Lot Number [id number]     Expiration Date [id number]     Comment --    CBC & Differential [[id number]]  (Abnormal) Collected: 04/22/24 0829    Specimen: Blood Updated: 04/22/24 0843    Narrative:      The following orders were created for panel order CBC & Differential.  Procedure                               Abnormality         Status                     ---------                               -----------         ------                     CBC Auto Differential[[id number]]        Abnormal            Final result                 Please view results for these tests on the individual orders.    Comprehensive Metabolic Panel [[id number]]  (Abnormal) Collected: 04/22/24 0829    Specimen: Blood Updated: 04/22/24 0909     Glucose 119 mg/dL      BUN 14 mg/dL      Creatinine 1.32 mg/dL      Sodium 135 mmol/L      Potassium 3.5 mmol/L      Comment: Slight hemolysis detected by analyzer. Result may be falsely elevated.        Chloride 97 mmol/L      CO2 22.6 mmol/L      Calcium 9.4 mg/dL      Total Protein 6.8 g/dL      Albumin 3.9 g/dL      ALT (SGPT) 20 U/L      AST (SGOT) 21 U/L      Alkaline Phosphatase 117 U/L      Total Bilirubin 0.3 mg/dL      Globulin 2.9 gm/dL      A/G Ratio 1.3 g/dL      BUN/Creatinine Ratio 10.6     Anion Gap 15.4 mmol/L      eGFR 45.7 mL/min/1.73     Narrative:      GFR Normal >60  Chronic Kidney Disease <60  Kidney Failure <15      Lipase [[id number]]  (Normal) Collected: 04/22/24 0829    Specimen: Blood Updated: 04/22/24 0909     Lipase 15 U/L     Lactic Acid, Plasma [[id number]]  (Normal) Collected: 04/22/24 0829    Specimen: Blood Updated: 04/22/24 0900      Lactate 1.3 mmol/L     CBC Auto Differential [[id number]]  (Abnormal) Collected: 04/22/24 0829    Specimen: Blood Updated: 04/22/24 0843     WBC 11.03 10*3/mm3      RBC 5.33 10*6/mm3      Hemoglobin 15.5 g/dL      Hematocrit 48.2 %      MCV 90.4 fL      MCH 29.1 pg      MCHC 32.2 g/dL      RDW 12.6 %      RDW-SD 41.9 fl      MPV 10.7 fL      Platelets 163 10*3/mm3      Neutrophil % 86.0 %      Lymphocyte % 5.3 %      Monocyte % 6.7 %      Eosinophil % 1.1 %      Basophil % 0.5 %      Immature Grans % 0.4 %      Neutrophils, Absolute 9.49 10*3/mm3      Lymphocytes, Absolute 0.59 10*3/mm3      Monocytes, Absolute 0.74 10*3/mm3      Eosinophils, Absolute 0.12 10*3/mm3      Basophils, Absolute 0.05 10*3/mm3      Immature Grans, Absolute 0.04 10*3/mm3      nRBC 0.0 /100 WBC     COVID-19, FLU A/B, RSV PCR 1 HR TAT - Swab, Nasopharynx [[id number]]  (Normal) Collected: 04/22/24 0845    Specimen: Swab from Nasopharynx Updated: 04/22/24 0953     COVID19 Not Detected     Influenza A PCR Not Detected     Influenza B PCR Not Detected     RSV, PCR Not Detected    Narrative:      Fact sheet for providers: https://www.fda.gov/media/015357/download    Fact sheet for patients: https://www.fda.gov/media/892718/download    Test performed by PCR.    Urinalysis With Microscopic If Indicated (No Culture) - Urine, Clean Catch [[id number]]  (Abnormal) Collected: 04/22/24 0902    Specimen: Urine, Clean Catch Updated: 04/22/24 0928     Color, UA Dark Yellow     Appearance, UA Cloudy     pH, UA 5.5     Specific Gravity, UA >1.030     Glucose, UA Negative     Ketones, UA Trace     Bilirubin, UA Small (1+)     Blood, UA Negative     Protein, UA 30 mg/dL (1+)     Leuk Esterase, UA Trace     Nitrite, UA Negative     Urobilinogen, UA 1.0 E.U./dL    Urinalysis, Microscopic Only - Urine, Clean Catch [[id number]]  (Abnormal) Collected: 04/22/24 0902    Specimen: Urine, Clean Catch Updated: 04/22/24 0928     RBC, UA None Seen /HPF      WBC, UA 0-2 /HPF       Bacteria, UA Trace /HPF      Squamous Epithelial Cells, UA 3-6 /HPF      Yeast, UA Small/1+ Yeast /HPF      Hyaline Casts, UA 0-2 /LPF      Methodology Manual Light Microscopy    Enteric Bacterial Panel - Stool, Per Rectum [[id number]] Collected: 04/22/24 1019    Specimen: Stool from Per Rectum Updated: 04/22/24 1022             Imaging:    CT Abdomen Pelvis With Contrast    Result Date: 4/22/2024  CT ABDOMEN PELVIS W CONTRAST-  Date of Exam: 4/22/2024 9:20 AM  Indication: abdominal pain.  Comparison: None available.  Technique: Axial CT images were obtained of the abdomen and pelvis following the uneventful intravenous administration of 100 mL Isovue-370. Reconstructed coronal and sagittal images were also obtained. Automated exposure control and iterative construction methods were used.   Findings: Within the lung bases is emphysema.  The liver, adrenal glands, right kidney, spleen, and pancreas are unremarkable. The gallbladder is surgically absent. There is a small left renal cyst.  The stomach appears normal. The small bowel appears normal in caliber and configuration. There is inflammation along the right colon suggesting acute colitis. There is sigmoid diverticulosis. The appendix is surgically absent. There is no ascites or loculated collection. No abnormally enlarged lymph nodes are identified.  The rectum, uterus, and urinary bladder are unremarkable.  No aggressive osseous lesions are identified.      Impression: 1.  Inflammation along right colon, suggesting acute colitis. 2.  Sigmoid diverticulosis.    Electronically Signed By-Jeremy Lay MD On:4/22/2024 9:51 AM         Differential Diagnosis and Discussion:    Diarrhea: Differential diagnosis includes but is not limited to malabsorption syndrome, bacterial infection, carcinoid syndrome, pancreatic hypersecretion, viral infection, celiac sprue, Crohn's disease, ulcerative colitis, ischemic colitis, colitis, hypermotility, and irritable bowel  syndrome.  Vomiting: Differential diagnosis includes but is not limited to migraine, labyrinthine disorders, psychogenic, metabolic and endocrine causes, peptic ulcer, gastric outlet obstruction, gastritis, gastroenteritis, appendicitis, intestinal obstruction, paralytic ileus, food poisoning, cholecystitis, acute hepatitis, acute pancreatitis, acute febrile illness, and myocardial infarction.    All labs were reviewed and interpreted by me.  CT scan radiology impression was interpreted by me.    MDM     Amount and/or Complexity of Data Reviewed  Clinical lab tests: reviewed  Tests in the radiology section of CPT®: reviewed  Decide to obtain previous medical records or to obtain history from someone other than the patient: yes    The patient is resting comfortably and feels better, is alert and in no distress. Repeat examination is unremarkable and benign; in particular, there's no discomfort at McBurney's point and there is no pulsatile mass. The history, exam, diagnostic testing, and current condition does not suggest acute appendicitis, bowel obstruction, acute cholecystitis, bowel perforation, major gastrointestinal bleeding, severe diverticulitis, abdominal aortic aneurysm, mesenteric ischemia, volvulus, sepsis, or other significant pathology that warrants further testing, continued ED treatment, admission, for surgical evaluation at this point. The vital signs have been stable. The patient does not have uncontrollable pain, intractable vomiting, or other significant symptoms. The patient's condition is stable and appropriate for discharge from the emergency department.  Urinalysis shows improvement from sample yesterday at urgent care.  Will switch patient over to ciprofloxacin and Flagyl, which should cover for urinary tract infection, and have her discontinue the Bactrim.  Will also start patient on Zofran for nausea.  Enteric bacterial panel is still pending, informed patient she would receive a call  concerning abnormal results, and may follow with Charity and discussed the results with her PCP.  Discussed these findings with the patient, encouraged increasing intake of oral fluids and follow-up with PCP.            Patient Care Considerations:    SEPSIS was considered but is NOT present in the emergency department as SIRS criteria is not present. CONSULT: I considered consulting GI, however no acute complications.      Consultants/Shared Management Plan:    None    Social Determinants of Health:    Patient is independent, reliable, and has access to care.       Disposition and Care Coordination:    Discharged: I considered escalation of care by admitting this patient to the hospital, however abdominal pain has improved, emesis is controlled.    I have explained the patient´s condition, diagnoses and treatment plan based on the information available to me at this time. I have answered questions and addressed any concerns. The patient has a good  understanding of the patient´s diagnosis, condition, and treatment plan as can be expected at this point. The vital signs have been stable. The patient´s condition is stable and appropriate for discharge from the emergency department.      The patient will pursue further outpatient evaluation with the primary care physician or other designated or consulting physician as outlined in the discharge instructions. They are agreeable to this plan of care and follow-up instructions have been explained in detail. The patient has received these instructions in written format and has expressed an understanding of the discharge instructions. The patient is aware that any significant change in condition or worsening of symptoms should prompt an immediate return to this or the closest emergency department or call to 911.  I have explained discharge medications and the need for follow up with the patient/caretakers. This was also printed in the discharge instructions. Patient was  discharged with the following medications and follow up:      Medication List        New Prescriptions      ciprofloxacin 500 MG tablet  Commonly known as: CIPRO  Take 1 tablet by mouth 2 (Two) Times a Day for 7 days.     metroNIDAZOLE 500 MG tablet  Commonly known as: FLAGYL  Take 1 tablet by mouth 3 (Three) Times a Day for 7 days.     ondansetron ODT 4 MG disintegrating tablet  Commonly known as: ZOFRAN-ODT  Place 1 tablet on the tongue 4 (Four) Times a Day As Needed for Nausea or Vomiting.               Where to Get Your Medications        These medications were sent to [facility] [address] [place] [address] [address] [facility] [phone] [facility] [phone] [facility] [address] [facility] [address]      Phone: [phone]   ciprofloxacin 500 MG tablet  metroNIDAZOLE 500 MG tablet  ondansetron ODT 4 MG disintegrating tablet      Cherelle Jones, FABIAN  [address] [place] [address] [phone]    Call in 2 days         Final diagnoses:   Diarrhea of presumed infectious origin   Colitis        ED Disposition       ED Disposition   Discharge    Condition   Stable    Comment   --               This medical record created using voice recognition software.             Francoise Romeo, FABIAN  04/22/24 9644

## 2024-04-23 LAB
C COLI+JEJ+UPSA DNA STL QL NAA+NON-PROBE: DETECTED
EC STX1+STX2 GENES STL QL NAA+NON-PROBE: NOT DETECTED
S ENT+BONG DNA STL QL NAA+NON-PROBE: NOT DETECTED
SHIGELLA SP+EIEC IPAH ST NAA+NON-PROBE: NOT DETECTED

## 2024-06-05 ENCOUNTER — HOSPITAL ENCOUNTER (OUTPATIENT)
Dept: GENERAL RADIOLOGY | Facility: HOSPITAL | Age: 64
Discharge: HOME | End: 2024-06-05
Payer: MEDICARE

## 2024-06-05 ENCOUNTER — OFFICE VISIT (OUTPATIENT)
Dept: FAMILY MEDICINE CLINIC | Facility: CLINIC | Age: 64
End: 2024-06-05
Payer: MEDICARE

## 2024-06-05 VITALS
HEART RATE: 64 BPM | TEMPERATURE: 97.2 F | SYSTOLIC BLOOD PRESSURE: 128 MMHG | OXYGEN SATURATION: 94 % | HEIGHT: 63 IN | BODY MASS INDEX: 29.93 KG/M2 | DIASTOLIC BLOOD PRESSURE: 70 MMHG | RESPIRATION RATE: 16 BRPM | WEIGHT: 168.9 LBS

## 2024-06-05 DIAGNOSIS — R05.1 ACUTE COUGH: ICD-10-CM

## 2024-06-05 DIAGNOSIS — F17.210 CIGARETTE NICOTINE DEPENDENCE WITHOUT COMPLICATION: ICD-10-CM

## 2024-06-05 DIAGNOSIS — R05.1 ACUTE COUGH: Primary | ICD-10-CM

## 2024-06-05 PROCEDURE — 1126F AMNT PAIN NOTED NONE PRSNT: CPT | Performed by: NURSE PRACTITIONER

## 2024-06-05 PROCEDURE — 1159F MED LIST DOCD IN RCRD: CPT | Performed by: NURSE PRACTITIONER

## 2024-06-05 PROCEDURE — 71110 X-RAY EXAM RIBS BIL 3 VIEWS: CPT

## 2024-06-05 PROCEDURE — G2211 COMPLEX E/M VISIT ADD ON: HCPCS | Performed by: NURSE PRACTITIONER

## 2024-06-05 PROCEDURE — 99213 OFFICE O/P EST LOW 20 MIN: CPT | Performed by: NURSE PRACTITIONER

## 2024-06-05 PROCEDURE — 1160F RVW MEDS BY RX/DR IN RCRD: CPT | Performed by: NURSE PRACTITIONER

## 2024-06-05 PROCEDURE — 71046 X-RAY EXAM CHEST 2 VIEWS: CPT

## 2024-06-05 RX ORDER — PREDNISONE 50 MG/1
50 TABLET ORAL DAILY
Qty: 5 TABLET | Refills: 0 | Status: SHIPPED | OUTPATIENT
Start: 2024-06-05

## 2024-06-05 NOTE — PROGRESS NOTES
Chief Complaint  Cough (Symptoms 2-3 days) and Rib Pain    Subjective          Italia Beard presents to [facility]  History of Present Illness  She is here for cough.  She said that her granddaughter was sick last week which I did treat her for more upper respiratory and ear infection but she said that her cough started about 2 to 3 days ago.  She is currently still smoking.  She is not getting that much up but she does not know if it is potentially one of her COPD exacerbations or a little bit of bronchitis.  But she has been hurting more on the left side of her ribs.  She has not had a fever.  She is eating and drinking.  Previously she was in the emergency room for colitis and had a bacterial infection in her bowel and was placed on 2 different antibiotics 1 was azithromycin the second was it was ciprofloxacin.  She did finish all of those medicines up just recently at the beginning of May.  She has not had any other bowel issues recently.    Depression: Not at risk (2/27/2024)    PHQ-2     PHQ-2 Score: 0    and 2/27/2024               Allergies  Penicillins    Social History     Tobacco Use    Smoking status: Every Day     Current packs/day: 1.00     Average packs/day: 1 pack/day for 41.4 years (41.4 ttl pk-yrs)     Types: Cigarettes     Start date: 1983    Smokeless tobacco: Never   Vaping Use    Vaping status: Never Used   Substance Use Topics    Alcohol use: Never    Drug use: Never       Family History   Problem Relation Age of Onset    No Known Problems Mother     Heart attack Father     Malig Hyperthermia Neg Hx         Health Maintenance Due   Topic Date Due    PAP SMEAR  09/24/2022        Immunization History   Administered Date(s) Administered    Flu Vaccine Intradermal Quad 18-64YR 09/24/2020    Hepatitis A 05/18/2018    Influenza Seasonal Injectable 09/24/2020       Review of Systems   Constitutional:  Negative for chills, fever and unexpected weight loss.  "  HENT:  Positive for postnasal drip and rhinorrhea. Negative for ear pain and sore throat.    Respiratory:  Positive for cough, shortness of breath and wheezing.    Musculoskeletal:  Positive for myalgias.   Skin:  Negative for rash.        Objective       Vitals:    06/05/24 0845 06/05/24 0853   BP: 148/81 128/70   Pulse: 64    Resp: 16    Temp: 97.2 °F (36.2 °C)    SpO2: 94%    Weight: 76.6 kg (168 lb 14.4 oz)    Height: 160 cm (63\")        Body mass index is 29.92 kg/m².         Physical Exam  Vitals reviewed.   Constitutional:       Appearance: Normal appearance. She is well-developed.   HENT:      Right Ear: Tympanic membrane and ear canal normal. There is no impacted cerumen.      Left Ear: Tympanic membrane and ear canal normal. There is no impacted cerumen.      Nose: Congestion and rhinorrhea present.      Mouth/Throat:      Pharynx: Posterior oropharyngeal erythema present. No oropharyngeal exudate.   Eyes:      General: No scleral icterus.        Right eye: No discharge.         Left eye: No discharge.      Conjunctiva/sclera: Conjunctivae normal.   Cardiovascular:      Rate and Rhythm: Normal rate and regular rhythm.      Heart sounds: Normal heart sounds. No murmur heard.  Pulmonary:      Effort: Pulmonary effort is normal.      Breath sounds: Wheezing and rhonchi present.   Neurological:      Mental Status: She is alert and oriented to person, place, and time.      Cranial Nerves: No cranial nerve deficit.      Motor: No weakness.   Psychiatric:         Mood and Affect: Mood and affect normal.               Result Review :     The following data was reviewed by: FABIAN Mendez on 06/05/2024:                       Assessment and Plan      Assessment & Plan  Acute cough  We will do the x-ray of the chest and rib along with the prednisone.  I discussed that I do not wish to do another round of antibiotics that she has just had 1 within a 35-day timeframe.  Push fluids.  Discussed that she " really needs to quit smoking.  Call with questions or concerns.  Cigarette nicotine dependence without complication          Orders Placed This Encounter   Procedures    XR Chest PA & Lateral    XR Ribs Bilateral 3 View (In Office)     New Medications Ordered This Visit   Medications    predniSONE (DELTASONE) 50 MG tablet     Sig: Take 1 tablet by mouth Daily.     Dispense:  5 tablet     Refill:  0          Diagnosis Plan   1. Acute cough  XR Chest PA & Lateral    XR Ribs Bilateral 3 View (In Office)    predniSONE (DELTASONE) 50 MG tablet      2. Cigarette nicotine dependence without complication                  Follow Up     Return if symptoms worsen or fail to improve.    Patient was given instructions and counseling regarding her condition or for health maintenance advice. Please see specific information pulled into the AVS if appropriate.     Parts of this note are electronic transcriptions/translations of spoken language to printed text using the Dragon Dictation system.          Cherelle Jones, FABIAN  06/05/2024  Answers submitted by the patient for this visit:  Primary Reason for Visit (Submitted on 6/4/2024)  What is the primary reason for your visit?: Cough

## 2024-08-15 DIAGNOSIS — I10 PRIMARY HYPERTENSION: ICD-10-CM

## 2024-08-15 RX ORDER — METOPROLOL SUCCINATE 25 MG/1
25 TABLET, EXTENDED RELEASE ORAL DAILY
Qty: 90 TABLET | Refills: 0 | Status: SHIPPED | OUTPATIENT
Start: 2024-08-15

## 2024-08-20 ENCOUNTER — OFFICE VISIT (OUTPATIENT)
Dept: FAMILY MEDICINE CLINIC | Facility: CLINIC | Age: 64
End: 2024-08-20
Payer: MEDICARE

## 2024-08-20 ENCOUNTER — PATIENT MESSAGE (OUTPATIENT)
Dept: FAMILY MEDICINE CLINIC | Facility: CLINIC | Age: 64
End: 2024-08-20

## 2024-08-20 VITALS
WEIGHT: 168.4 LBS | HEIGHT: 63 IN | TEMPERATURE: 96.3 F | BODY MASS INDEX: 29.84 KG/M2 | RESPIRATION RATE: 16 BRPM | SYSTOLIC BLOOD PRESSURE: 150 MMHG | HEART RATE: 61 BPM | OXYGEN SATURATION: 96 % | DIASTOLIC BLOOD PRESSURE: 80 MMHG

## 2024-08-20 DIAGNOSIS — M81.0 OSTEOPOROSIS, UNSPECIFIED OSTEOPOROSIS TYPE, UNSPECIFIED PATHOLOGICAL FRACTURE PRESENCE: ICD-10-CM

## 2024-08-20 DIAGNOSIS — J44.9 CHRONIC OBSTRUCTIVE PULMONARY DISEASE, UNSPECIFIED COPD TYPE: ICD-10-CM

## 2024-08-20 DIAGNOSIS — E78.2 MIXED HYPERLIPIDEMIA: ICD-10-CM

## 2024-08-20 DIAGNOSIS — I10 PRIMARY HYPERTENSION: Primary | ICD-10-CM

## 2024-08-20 DIAGNOSIS — I49.5 SSS (SICK SINUS SYNDROME): ICD-10-CM

## 2024-08-20 DIAGNOSIS — E03.9 ACQUIRED HYPOTHYROIDISM: ICD-10-CM

## 2024-08-20 DIAGNOSIS — G43.819 OTHER MIGRAINE WITHOUT STATUS MIGRAINOSUS, INTRACTABLE: ICD-10-CM

## 2024-08-20 DIAGNOSIS — F17.210 CIGARETTE NICOTINE DEPENDENCE WITHOUT COMPLICATION: ICD-10-CM

## 2024-08-20 LAB
ALBUMIN SERPL-MCNC: 4.1 G/DL (ref 3.5–5.2)
ALBUMIN/GLOB SERPL: 1.6 G/DL
ALP SERPL-CCNC: 126 U/L (ref 39–117)
ALT SERPL W P-5'-P-CCNC: 16 U/L (ref 1–33)
ANION GAP SERPL CALCULATED.3IONS-SCNC: 10 MMOL/L (ref 5–15)
AST SERPL-CCNC: 17 U/L (ref 1–32)
BASOPHILS # BLD AUTO: 0.04 10*3/MM3 (ref 0–0.2)
BASOPHILS NFR BLD AUTO: 0.8 % (ref 0–1.5)
BILIRUB SERPL-MCNC: 0.3 MG/DL (ref 0–1.2)
BUN SERPL-MCNC: 11 MG/DL (ref 8–23)
BUN/CREAT SERPL: 9.4 (ref 7–25)
CALCIUM SPEC-SCNC: 9.8 MG/DL (ref 8.6–10.5)
CHLORIDE SERPL-SCNC: 104 MMOL/L (ref 98–107)
CHOLEST SERPL-MCNC: 117 MG/DL (ref 0–200)
CO2 SERPL-SCNC: 28 MMOL/L (ref 22–29)
CREAT SERPL-MCNC: 1.17 MG/DL (ref 0.57–1)
DEPRECATED RDW RBC AUTO: 40.9 FL (ref 37–54)
EGFRCR SERPLBLD CKD-EPI 2021: 52.9 ML/MIN/1.73
EOSINOPHIL # BLD AUTO: 0.04 10*3/MM3 (ref 0–0.4)
EOSINOPHIL NFR BLD AUTO: 0.8 % (ref 0.3–6.2)
ERYTHROCYTE [DISTWIDTH] IN BLOOD BY AUTOMATED COUNT: 12.6 % (ref 12.3–15.4)
GLOBULIN UR ELPH-MCNC: 2.5 GM/DL
GLUCOSE SERPL-MCNC: 88 MG/DL (ref 65–99)
HCT VFR BLD AUTO: 46.1 % (ref 34–46.6)
HDLC SERPL-MCNC: 60 MG/DL (ref 40–60)
HGB BLD-MCNC: 15.2 G/DL (ref 12–15.9)
IMM GRANULOCYTES # BLD AUTO: 0.01 10*3/MM3 (ref 0–0.05)
IMM GRANULOCYTES NFR BLD AUTO: 0.2 % (ref 0–0.5)
LDLC SERPL CALC-MCNC: 36 MG/DL (ref 0–100)
LDLC/HDLC SERPL: 0.55 {RATIO}
LYMPHOCYTES # BLD AUTO: 1.32 10*3/MM3 (ref 0.7–3.1)
LYMPHOCYTES NFR BLD AUTO: 25.5 % (ref 19.6–45.3)
MCH RBC QN AUTO: 29.2 PG (ref 26.6–33)
MCHC RBC AUTO-ENTMCNC: 33 G/DL (ref 31.5–35.7)
MCV RBC AUTO: 88.7 FL (ref 79–97)
MONOCYTES # BLD AUTO: 0.38 10*3/MM3 (ref 0.1–0.9)
MONOCYTES NFR BLD AUTO: 7.4 % (ref 5–12)
NEUTROPHILS NFR BLD AUTO: 3.38 10*3/MM3 (ref 1.7–7)
NEUTROPHILS NFR BLD AUTO: 65.3 % (ref 42.7–76)
NRBC BLD AUTO-RTO: 0 /100 WBC (ref 0–0.2)
PLATELET # BLD AUTO: 155 10*3/MM3 (ref 140–450)
PMV BLD AUTO: 12.3 FL (ref 6–12)
POTASSIUM SERPL-SCNC: 4.1 MMOL/L (ref 3.5–5.2)
PROT SERPL-MCNC: 6.6 G/DL (ref 6–8.5)
RBC # BLD AUTO: 5.2 10*6/MM3 (ref 3.77–5.28)
SODIUM SERPL-SCNC: 142 MMOL/L (ref 136–145)
TRIGL SERPL-MCNC: 121 MG/DL (ref 0–150)
TSH SERPL DL<=0.05 MIU/L-ACNC: 0.3 UIU/ML (ref 0.27–4.2)
VLDLC SERPL-MCNC: 21 MG/DL (ref 5–40)
WBC NRBC COR # BLD AUTO: 5.17 10*3/MM3 (ref 3.4–10.8)

## 2024-08-20 PROCEDURE — 85025 COMPLETE CBC W/AUTO DIFF WBC: CPT | Performed by: NURSE PRACTITIONER

## 2024-08-20 PROCEDURE — 1159F MED LIST DOCD IN RCRD: CPT | Performed by: NURSE PRACTITIONER

## 2024-08-20 PROCEDURE — 80053 COMPREHEN METABOLIC PANEL: CPT | Performed by: NURSE PRACTITIONER

## 2024-08-20 PROCEDURE — 99214 OFFICE O/P EST MOD 30 MIN: CPT | Performed by: NURSE PRACTITIONER

## 2024-08-20 PROCEDURE — 84443 ASSAY THYROID STIM HORMONE: CPT | Performed by: NURSE PRACTITIONER

## 2024-08-20 PROCEDURE — 1160F RVW MEDS BY RX/DR IN RCRD: CPT | Performed by: NURSE PRACTITIONER

## 2024-08-20 PROCEDURE — 1126F AMNT PAIN NOTED NONE PRSNT: CPT | Performed by: NURSE PRACTITIONER

## 2024-08-20 PROCEDURE — G2211 COMPLEX E/M VISIT ADD ON: HCPCS | Performed by: NURSE PRACTITIONER

## 2024-08-20 PROCEDURE — 80061 LIPID PANEL: CPT | Performed by: NURSE PRACTITIONER

## 2024-08-20 RX ORDER — ALENDRONATE SODIUM 70 MG/1
70 TABLET ORAL
Qty: 13 TABLET | Refills: 1 | Status: SHIPPED | OUTPATIENT
Start: 2024-08-20

## 2024-08-20 RX ORDER — LEVOTHYROXINE SODIUM 0.12 MG/1
125 TABLET ORAL DAILY
Qty: 90 TABLET | Refills: 1 | Status: SHIPPED | OUTPATIENT
Start: 2024-08-20

## 2024-08-20 RX ORDER — FLUTICASONE FUROATE AND VILANTEROL 100; 25 UG/1; UG/1
1 POWDER RESPIRATORY (INHALATION)
Qty: 28 EACH | Refills: 5 | Status: SHIPPED | OUTPATIENT
Start: 2024-08-20

## 2024-08-20 RX ORDER — METOPROLOL SUCCINATE 25 MG/1
25 TABLET, EXTENDED RELEASE ORAL DAILY
Qty: 90 TABLET | Refills: 1 | Status: SHIPPED | OUTPATIENT
Start: 2024-08-20

## 2024-08-20 RX ORDER — SUMATRIPTAN 100 MG/1
100 TABLET, FILM COATED ORAL AS NEEDED
Qty: 9 TABLET | Refills: 5 | Status: SHIPPED | OUTPATIENT
Start: 2024-08-20

## 2024-08-20 RX ORDER — ALBUTEROL SULFATE 90 UG/1
2 AEROSOL, METERED RESPIRATORY (INHALATION) EVERY 4 HOURS PRN
Qty: 18 G | Refills: 2 | Status: SHIPPED | OUTPATIENT
Start: 2024-08-20

## 2024-08-20 RX ORDER — ATORVASTATIN CALCIUM 40 MG/1
40 TABLET, FILM COATED ORAL NIGHTLY
Qty: 90 TABLET | Refills: 1 | Status: SHIPPED | OUTPATIENT
Start: 2024-08-20

## 2024-08-20 NOTE — PROGRESS NOTES
Chief Complaint  Hyperlipidemia, Hypertension, and Hypothyroidism    Subjective          Italia Beard presents to [facility]  History of Present Illness  She is fasting for labs today(had a little coffee).    THYROID: She has been taking her Synthroid daily with no issues noted.  LIPID:  She is taking her Lipitor daily.  NO leg issues.  She did have coffee with creamer/sweetener.    She is not having any chest pain or shortness of breath noted.  She does currently smoke and is not ready to stop smoking. She would like an inhaler regularly even her albuterol.       Migraine:  She has been taking her imitrex when needed.  She will take advil and that does calm the headache.    She has a pacemaker and does see cardiology  Osteoporosis: She is tolerating the fosomax.    Depression: Not at risk (2/27/2024)    PHQ-2     PHQ-2 Score: 0    and 2/27/2024    BMI is >= 25 and <30. (Overweight) The following options were offered after discussion;: exercise counseling/recommendations and nutrition counseling/recommendations         Allergies  Penicillins    Social History     Tobacco Use    Smoking status: Every Day     Current packs/day: 1.00     Average packs/day: 1 pack/day for 41.6 years (41.6 ttl pk-yrs)     Types: Cigarettes     Start date: 1983    Smokeless tobacco: Never   Vaping Use    Vaping status: Never Used   Substance Use Topics    Alcohol use: Never    Drug use: Never       Family History   Problem Relation Age of Onset    No Known Problems Mother     Heart attack Father     Malig Hyperthermia Neg Hx         Health Maintenance Due   Topic Date Due    INFLUENZA VACCINE  08/01/2024    LUNG CANCER SCREENING  09/06/2024        Immunization History   Administered Date(s) Administered    Flu Vaccine Intradermal Quad 18-64YR 09/24/2020    Hepatitis A 05/18/2018    Influenza Seasonal Injectable 09/24/2020       Review of Systems   Constitutional:  Negative for fatigue.  "  Respiratory:  Negative for cough and shortness of breath.    Cardiovascular:  Negative for chest pain.   Gastrointestinal:  Negative for diarrhea, nausea and vomiting.        Objective       Vitals:    08/20/24 0950 08/20/24 0955   BP: 148/82 150/80   Pulse: 61    Resp: 16    Temp: 96.3 °F (35.7 °C)    SpO2: 96%    Weight: 76.4 kg (168 lb 6.4 oz)    Height: 160 cm (63\")        Body mass index is 29.83 kg/m².         Physical Exam  Vitals reviewed.   Constitutional:       Appearance: Normal appearance. She is well-developed.   Cardiovascular:      Rate and Rhythm: Normal rate and regular rhythm.      Heart sounds: Normal heart sounds. No murmur heard.  Pulmonary:      Effort: Pulmonary effort is normal.      Breath sounds: Normal breath sounds.   Neurological:      Mental Status: She is alert and oriented to person, place, and time.      Cranial Nerves: No cranial nerve deficit.      Motor: No weakness.   Psychiatric:         Mood and Affect: Mood and affect normal.               Result Review :     The following data was reviewed by: FABIAN Mendez on 08/20/2024:    Common Labs   Common labs          2/27/2024    09:27 4/22/2024    08:29   Common Labs   Glucose 97  119    BUN 11  14    Creatinine 1.15  1.32    Sodium 142  135    Potassium 4.1  3.5    Chloride 105  97    Calcium 10.1  9.4    Albumin 4.4  3.9    Total Bilirubin 0.5  0.3    Alkaline Phosphatase 118  117    AST (SGOT) 19  21    ALT (SGPT) 19  20    WBC 6.69  11.03    Hemoglobin 15.0  15.5    Hematocrit 45.5  48.2    Platelets 211  163    Total Cholesterol 149     Triglycerides 119     HDL Cholesterol 74     LDL Cholesterol  54             XR Ribs Bilateral 3 View    Result Date: 6/5/2024  Impression: 1. Pulmonary hyperinflation suggesting emphysema. No acute cardiopulmonary findings. 2. No evidence of displaced rib fracture. Electronically Signed: Ken Mao MD  6/5/2024 2:23 PM EDT  Workstation ID: [id number]    XR Chest PA & " Lateral    Result Date: 6/5/2024  Impression: 1. Pulmonary hyperinflation suggesting emphysema. No acute cardiopulmonary findings. 2. No evidence of displaced rib fracture. Electronically Signed: Ken Mao MD  6/5/2024 2:23 PM EDT  Workstation ID: [id number]    CT Abdomen Pelvis With Contrast    Result Date: 4/22/2024  Impression: 1.  Inflammation along right colon, suggesting acute colitis. 2.  Sigmoid diverticulosis.    Electronically Signed By-Jeremy Lay MD On:4/22/2024 9:51 AM                  Assessment and Plan      Assessment & Plan  Primary hypertension    Chronic obstructive pulmonary disease, unspecified COPD type      Mixed hyperlipidemia     Cigarette nicotine dependence without complication      Acquired hypothyroidism    Other migraine without status migrainosus, intractable            Osteoporosis, unspecified osteoporosis type, unspecified pathological fracture presence    SSS (sick sinus syndrome)      Orders Placed This Encounter   Procedures     CT Chest Low Dose Cancer Screening WO    Comprehensive Metabolic Panel    TSH    Lipid Panel    CBC Auto Differential    CBC & Differential     New Medications Ordered This Visit   Medications    albuterol sulfate  (90 Base) MCG/ACT inhaler     Sig: Inhale 2 puffs Every 4 (Four) Hours As Needed for Wheezing.     Dispense:  18 g     Refill:  2    alendronate (Fosamax) 70 MG tablet     Sig: Take 1 tablet by mouth Every 7 (Seven) Days.     Dispense:  13 tablet     Refill:  1    atorvastatin (LIPITOR) 40 MG tablet     Sig: Take 1 tablet by mouth Every Night.     Dispense:  90 tablet     Refill:  1    Fluticasone Furoate-Vilanterol (Breo Ellipta) 100-25 MCG/ACT aerosol powder      Sig: Inhale 1 puff Daily.     Dispense:  28 each     Refill:  5    levothyroxine (SYNTHROID, LEVOTHROID) 125 MCG tablet     Sig: Take 1 tablet by mouth Daily.     Dispense:  90 tablet     Refill:  1    metoprolol succinate XL (TOPROL-XL) 25 MG 24 hr tablet     Sig: Take  1 tablet by mouth Daily.     Dispense:  90 tablet     Refill:  1    SUMAtriptan (IMITREX) 100 MG tablet     Sig: Take 1 tablet by mouth As Needed for Migraine.     Dispense:  9 tablet     Refill:  5          Diagnosis Plan   1. Primary hypertension  albuterol sulfate  (90 Base) MCG/ACT inhaler    metoprolol succinate XL (TOPROL-XL) 25 MG 24 hr tablet    Comprehensive Metabolic Panel    CBC & Differential    TSH    Lipid Panel      2. Chronic obstructive pulmonary disease, unspecified COPD type  albuterol sulfate  (90 Base) MCG/ACT inhaler    Fluticasone Furoate-Vilanterol (Breo Ellipta) 100-25 MCG/ACT aerosol powder       3. Mixed hyperlipidemia  atorvastatin (LIPITOR) 40 MG tablet      4. Cigarette nicotine dependence without complication  Fluticasone Furoate-Vilanterol (Breo Ellipta) 100-25 MCG/ACT aerosol powder      CT Chest Low Dose Cancer Screening WO      5. Acquired hypothyroidism  levothyroxine (SYNTHROID, LEVOTHROID) 125 MCG tablet      6. Other migraine without status migrainosus, intractable  SUMAtriptan (IMITREX) 100 MG tablet      7. Osteoporosis, unspecified osteoporosis type, unspecified pathological fracture presence  alendronate (Fosamax) 70 MG tablet      8. SSS (sick sinus syndrome)                  Follow Up     Return in about 6 months (around 2/20/2025).  Follow-up in 6 months for labs and appt. Call with any concerns or questions that you may have regarding your medications or history.    I have reviewed all medications and at this time no medications changes need to be adjusted for all chronic conditions.    Patient was given instructions and counseling regarding her condition or for health maintenance advice. Please see specific information pulled into the AVS if appropriate.     Parts of this note are electronic transcriptions/translations of spoken language to printed text using the Dragon Dictation system.          Cherelle Jones, FABIAN  08/20/2024

## 2024-09-09 ENCOUNTER — HOSPITAL ENCOUNTER (OUTPATIENT)
Dept: CT IMAGING | Facility: HOSPITAL | Age: 64
Discharge: HOME | End: 2024-09-09
Admitting: NURSE PRACTITIONER
Payer: MEDICARE

## 2024-09-09 DIAGNOSIS — F17.210 CIGARETTE NICOTINE DEPENDENCE WITHOUT COMPLICATION: ICD-10-CM

## 2024-09-09 PROCEDURE — 71271 CT THORAX LUNG CANCER SCR C-: CPT

## 2024-11-25 ENCOUNTER — OFFICE VISIT (OUTPATIENT)
Dept: FAMILY MEDICINE CLINIC | Facility: CLINIC | Age: 64
End: 2024-11-25
Payer: MEDICARE

## 2024-11-25 VITALS
SYSTOLIC BLOOD PRESSURE: 163 MMHG | RESPIRATION RATE: 16 BRPM | HEIGHT: 63 IN | BODY MASS INDEX: 30.12 KG/M2 | HEART RATE: 104 BPM | DIASTOLIC BLOOD PRESSURE: 80 MMHG | TEMPERATURE: 98 F | WEIGHT: 170 LBS | OXYGEN SATURATION: 97 %

## 2024-11-25 DIAGNOSIS — I10 PRIMARY HYPERTENSION: ICD-10-CM

## 2024-11-25 DIAGNOSIS — J44.1 CHRONIC OBSTRUCTIVE PULMONARY DISEASE WITH ACUTE EXACERBATION: Primary | ICD-10-CM

## 2024-11-25 DIAGNOSIS — F17.210 CIGARETTE NICOTINE DEPENDENCE WITHOUT COMPLICATION: ICD-10-CM

## 2024-11-25 DIAGNOSIS — J01.10 ACUTE NON-RECURRENT FRONTAL SINUSITIS: ICD-10-CM

## 2024-11-25 PROCEDURE — 99214 OFFICE O/P EST MOD 30 MIN: CPT | Performed by: NURSE PRACTITIONER

## 2024-11-25 PROCEDURE — 1159F MED LIST DOCD IN RCRD: CPT | Performed by: NURSE PRACTITIONER

## 2024-11-25 PROCEDURE — 1126F AMNT PAIN NOTED NONE PRSNT: CPT | Performed by: NURSE PRACTITIONER

## 2024-11-25 PROCEDURE — 1160F RVW MEDS BY RX/DR IN RCRD: CPT | Performed by: NURSE PRACTITIONER

## 2024-11-25 RX ORDER — PREDNISONE 20 MG/1
20 TABLET ORAL 2 TIMES DAILY
Qty: 10 TABLET | Refills: 0 | Status: SHIPPED | OUTPATIENT
Start: 2024-11-25 | End: 2024-11-30

## 2024-11-25 RX ORDER — AZITHROMYCIN 250 MG/1
TABLET, FILM COATED ORAL
Qty: 6 TABLET | Refills: 0 | Status: SHIPPED | OUTPATIENT
Start: 2024-11-25

## 2024-11-25 RX ORDER — AMLODIPINE BESYLATE 2.5 MG/1
2.5 TABLET ORAL DAILY
Qty: 14 TABLET | Refills: 0 | Status: SHIPPED | OUTPATIENT
Start: 2024-11-25

## 2024-11-25 NOTE — PROGRESS NOTES
"Chief Complaint  Cough (States she has had these symptoms x 2 weeks), Sinusitis, and Headache    Subjective          Italia Beard presents to [facility]  History of Present Illness  She is here for sinus congestion cough.  She said that she has had the cough for couple weeks now and her  had it first.  She has not had any fever nausea or vomiting but she has coughed up green mucousy slime.  She is still smoking about the same.  She is not taking any over-the-counter medication.  She did take her blood pressure meds about 6 to 6:30 at night.    Her blood pressure was elevated at her recent check up to in the process.               Allergies  Penicillins    Social History     Tobacco Use    Smoking status: Every Day     Current packs/day: 1.00     Average packs/day: 1 pack/day for 41.9 years (41.9 ttl pk-yrs)     Types: Cigarettes     Start date: 1983    Smokeless tobacco: Never   Vaping Use    Vaping status: Never Used   Substance Use Topics    Alcohol use: Never    Drug use: Never       Family History   Problem Relation Age of Onset    No Known Problems Mother     Heart attack Father     Malig Hyperthermia Neg Hx         Health Maintenance Due   Topic Date Due    PAP SMEAR  09/24/2022        Immunization History   Administered Date(s) Administered    Flu Vaccine Intradermal Quad 18-64YR 09/24/2020    Hepatitis A 05/18/2018    Influenza Seasonal Injectable 09/24/2020       Review of Systems   Constitutional:  Negative for chills and fever.   HENT:  Positive for congestion, postnasal drip, rhinorrhea, sinus pressure and sore throat.    Respiratory:  Positive for cough. Negative for shortness of breath.    Gastrointestinal:  Negative for diarrhea, nausea and vomiting.   Skin:  Negative for rash.        Objective       Vitals:    11/25/24 0911   BP: 163/80   Pulse: 104   Resp: 16   Temp: 98 °F (36.7 °C)   SpO2: 97%   Weight: 77.1 kg (170 lb)   Height: 160 cm (63\") "       Body mass index is 30.11 kg/m².         Physical Exam  Vitals reviewed.   Constitutional:       Appearance: Normal appearance. She is well-developed.   HENT:      Right Ear: Tympanic membrane and ear canal normal. There is no impacted cerumen.      Left Ear: Tympanic membrane and ear canal normal. There is no impacted cerumen.      Nose: Congestion and rhinorrhea present.      Mouth/Throat:      Pharynx: Posterior oropharyngeal erythema present. No oropharyngeal exudate.   Eyes:      General: No scleral icterus.        Right eye: No discharge.         Left eye: No discharge.      Conjunctiva/sclera: Conjunctivae normal.   Cardiovascular:      Rate and Rhythm: Normal rate and regular rhythm.      Heart sounds: Normal heart sounds. No murmur heard.  Pulmonary:      Effort: Pulmonary effort is normal.      Breath sounds: Normal breath sounds. Rhonchi present. No wheezing.   Neurological:      Mental Status: She is alert and oriented to person, place, and time.      Cranial Nerves: No cranial nerve deficit.      Motor: No weakness.   Psychiatric:         Mood and Affect: Mood and affect normal.               Result Review :     The following data was reviewed by: FABIAN Mendez on 11/25/2024:            CT Chest Low Dose Cancer Screening WO    Result Date: 9/9/2024  Impression: LDCT chest demonstrating no new or suspicious lung nodules. Emphysema. Recommendation: Continue annual screening with LDCT Lung Rads Assessment: Lung-RADS L2 - Benign appearance or <1% chance of malignancy. Electronically Signed: Bang Everett MD  9/9/2024 4:29 PM EDT  Workstation ID: [id number]                Assessment and Plan      Assessment & Plan  Primary hypertension   discussed that we do need to get a tighter control on the blood pressure and we will add amlodipine for a trial.  She will let me know how her blood pressure is doing weekly and go from there.    Orders:    amLODIPine (Norvasc) 2.5 MG tablet; Take 1 tablet  by mouth Daily.    Chronic obstructive pulmonary disease with acute exacerbation      Orders:    azithromycin (Zithromax Z-Jesus) 250 MG tablet; Take 2 tablets by mouth on day 1, then 1 tablet daily on days 2-5    predniSONE (DELTASONE) 20 MG tablet; Take 1 tablet by mouth 2 (Two) Times a Day for 5 days.    Acute non-recurrent frontal sinusitis  She does not wish to have any type of a liquid medicine therefore we will do Zithromax and steroids and go from there.  Push fluids.  Orders:    azithromycin (Zithromax Z-Jesus) 250 MG tablet; Take 2 tablets by mouth on day 1, then 1 tablet daily on days 2-5    predniSONE (DELTASONE) 20 MG tablet; Take 1 tablet by mouth 2 (Two) Times a Day for 5 days.    Cigarette nicotine dependence without complication    She does not wish to quit smoking currently.                                                Diagnosis Plan   1. Chronic obstructive pulmonary disease with acute exacerbation  azithromycin (Zithromax Z-Jesus) 250 MG tablet    predniSONE (DELTASONE) 20 MG tablet      2. Primary hypertension  amLODIPine (Norvasc) 2.5 MG tablet      3. Acute non-recurrent frontal sinusitis  azithromycin (Zithromax Z-Jesus) 250 MG tablet    predniSONE (DELTASONE) 20 MG tablet      4. Cigarette nicotine dependence without complication                  Follow Up     Return if symptoms worsen or fail to improve.    Patient was given instructions and counseling regarding her condition or for health maintenance advice. Please see specific information pulled into the AVS if appropriate.     Parts of this note are electronic transcriptions/translations of spoken language to printed text using the Dragon Dictation system.          Cherelle Jones, APRN  11/25/2024

## 2024-11-25 NOTE — ASSESSMENT & PLAN NOTE
Orders:    azithromycin (Zithromax Z-Jesus) 250 MG tablet; Take 2 tablets by mouth on day 1, then 1 tablet daily on days 2-5    predniSONE (DELTASONE) 20 MG tablet; Take 1 tablet by mouth 2 (Two) Times a Day for 5 days.

## 2024-12-03 RX ORDER — AMLODIPINE BESYLATE 5 MG/1
5 TABLET ORAL DAILY
Qty: 30 TABLET | Refills: 1 | Status: SHIPPED | OUTPATIENT
Start: 2024-12-03

## 2024-12-06 ENCOUNTER — PATIENT ROUNDING (BHMG ONLY) (OUTPATIENT)
Dept: FAMILY MEDICINE CLINIC | Facility: CLINIC | Age: 64
End: 2024-12-06
Payer: MEDICARE

## 2024-12-23 ENCOUNTER — HOSPITAL ENCOUNTER (INPATIENT)
Facility: HOSPITAL | Age: 64
LOS: 1 days | Discharge: HOME | End: 2024-12-24
Attending: EMERGENCY MEDICINE | Admitting: FAMILY MEDICINE
Payer: MEDICARE

## 2024-12-23 ENCOUNTER — APPOINTMENT (OUTPATIENT)
Dept: GENERAL RADIOLOGY | Facility: HOSPITAL | Age: 64
End: 2024-12-23
Payer: MEDICARE

## 2024-12-23 DIAGNOSIS — J40 BRONCHITIS: ICD-10-CM

## 2024-12-23 DIAGNOSIS — J44.1 COPD WITH ACUTE EXACERBATION: Primary | ICD-10-CM

## 2024-12-23 PROBLEM — R06.02 SHORTNESS OF BREATH: Status: ACTIVE | Noted: 2024-12-23

## 2024-12-23 LAB
ALBUMIN SERPL-MCNC: 4 G/DL (ref 3.5–5.2)
ALBUMIN/GLOB SERPL: 1.2 G/DL
ALP SERPL-CCNC: 133 U/L (ref 39–117)
ALT SERPL W P-5'-P-CCNC: 13 U/L (ref 1–33)
ANION GAP SERPL CALCULATED.3IONS-SCNC: 9.6 MMOL/L (ref 5–15)
ARTERIAL PATENCY WRIST A: ABNORMAL
AST SERPL-CCNC: 20 U/L (ref 1–32)
ATMOSPHERIC PRESS: 754 MMHG
BASE EXCESS BLDA CALC-SCNC: 1.9 MMOL/L (ref -2–2)
BASOPHILS # BLD AUTO: 0.02 10*3/MM3 (ref 0–0.2)
BASOPHILS NFR BLD AUTO: 0.4 % (ref 0–1.5)
BDY SITE: ABNORMAL
BILIRUB SERPL-MCNC: 0.4 MG/DL (ref 0–1.2)
BUN BLDA-MCNC: 8 MG/DL (ref 8–23)
BUN SERPL-MCNC: 7 MG/DL (ref 8–23)
BUN/CREAT SERPL: 6.7 (ref 7–25)
CA-I BLDA-SCNC: 1.14 MMOL/L (ref 1.13–1.32)
CALCIUM SPEC-SCNC: 9.3 MG/DL (ref 8.6–10.5)
CHLORIDE BLDA-SCNC: 98 MMOL/L (ref 98–107)
CHLORIDE SERPL-SCNC: 97 MMOL/L (ref 98–107)
CO2 BLDA-SCNC: 26.6 MMOL/L (ref 23–27)
CO2 SERPL-SCNC: 28.4 MMOL/L (ref 22–29)
CREAT BLDA-MCNC: 0.99 MG/DL (ref 0.6–1.3)
CREAT SERPL-MCNC: 1.05 MG/DL (ref 0.57–1)
D-LACTATE SERPL-SCNC: 0.7 MMOL/L
D-LACTATE SERPL-SCNC: 0.8 MMOL/L (ref 0.5–2)
DEPRECATED RDW RBC AUTO: 45.3 FL (ref 37–54)
EGFRCR SERPLBLD CKD-EPI 2021: 59.8 ML/MIN/1.73
EOSINOPHIL # BLD AUTO: 0 10*3/MM3 (ref 0–0.4)
EOSINOPHIL NFR BLD AUTO: 0 % (ref 0.3–6.2)
ERYTHROCYTE [DISTWIDTH] IN BLOOD BY AUTOMATED COUNT: 14.1 % (ref 12.3–15.4)
FLUAV SUBTYP SPEC NAA+PROBE: DETECTED
FLUBV RNA ISLT QL NAA+PROBE: NOT DETECTED
GAS FLOW AIRWAY: 2 LPM
GEN 5 1HR TROPONIN T REFLEX: 9 NG/L
GLOBULIN UR ELPH-MCNC: 3.3 GM/DL
GLUCOSE BLDC GLUCOMTR-MCNC: 120 MG/DL (ref 65–99)
GLUCOSE SERPL-MCNC: 115 MG/DL (ref 65–99)
HCO3 BLDA-SCNC: 27.5 MMOL/L (ref 22–26)
HCT VFR BLD AUTO: 47.7 % (ref 34–46.6)
HCT VFR BLD CALC: 48 % (ref 38–51)
HEMODILUTION: NO
HGB BLD-MCNC: 15.5 G/DL (ref 12–15.9)
HGB BLDA-MCNC: 16.5 G/DL (ref 12–18)
HOLD SPECIMEN: NORMAL
HOLD SPECIMEN: NORMAL
IMM GRANULOCYTES # BLD AUTO: 0.02 10*3/MM3 (ref 0–0.05)
IMM GRANULOCYTES NFR BLD AUTO: 0.4 % (ref 0–0.5)
INHALED O2 CONCENTRATION: 28 %
LYMPHOCYTES # BLD AUTO: 0.27 10*3/MM3 (ref 0.7–3.1)
LYMPHOCYTES NFR BLD AUTO: 5.1 % (ref 19.6–45.3)
MCH RBC QN AUTO: 28.5 PG (ref 26.6–33)
MCHC RBC AUTO-ENTMCNC: 32.5 G/DL (ref 31.5–35.7)
MCV RBC AUTO: 87.8 FL (ref 79–97)
MODALITY: ABNORMAL
MONOCYTES # BLD AUTO: 0.44 10*3/MM3 (ref 0.1–0.9)
MONOCYTES NFR BLD AUTO: 8.3 % (ref 5–12)
NEUTROPHILS NFR BLD AUTO: 4.54 10*3/MM3 (ref 1.7–7)
NEUTROPHILS NFR BLD AUTO: 85.8 % (ref 42.7–76)
NRBC BLD AUTO-RTO: 0 /100 WBC (ref 0–0.2)
NT-PROBNP SERPL-MCNC: 2548 PG/ML (ref 0–900)
PCO2 BLDA: 45.2 MM HG (ref 35–45)
PH BLDA: 7.39 PH UNITS (ref 7.35–7.45)
PLATELET # BLD AUTO: 152 10*3/MM3 (ref 140–450)
PMV BLD AUTO: 10.9 FL (ref 6–12)
PO2 BLD: 289 MM[HG] (ref 0–500)
PO2 BLDA: 80.9 MM HG (ref 80–100)
POTASSIUM BLDA-SCNC: 4 MMOL/L (ref 3.5–5)
POTASSIUM SERPL-SCNC: 4.1 MMOL/L (ref 3.5–5.2)
PROCALCITONIN SERPL-MCNC: 0.12 NG/ML (ref 0–0.25)
PROT SERPL-MCNC: 7.3 G/DL (ref 6–8.5)
QT INTERVAL: 298 MS
QTC INTERVAL: 395 MS
RBC # BLD AUTO: 5.43 10*6/MM3 (ref 3.77–5.28)
RSV RNA NPH QL NAA+NON-PROBE: NOT DETECTED
SAO2 % BLDCOA: 95.7 % (ref 95–99)
SARS-COV-2 RNA RESP QL NAA+PROBE: NOT DETECTED
SODIUM BLD-SCNC: 136 MMOL/L (ref 131–143)
SODIUM SERPL-SCNC: 135 MMOL/L (ref 136–145)
TROPONIN T NUMERIC DELTA: 0 NG/L
TROPONIN T SERPL HS-MCNC: 9 NG/L
WBC NRBC COR # BLD AUTO: 5.29 10*3/MM3 (ref 3.4–10.8)
WHOLE BLOOD HOLD COAG: NORMAL
WHOLE BLOOD HOLD SPECIMEN: NORMAL

## 2024-12-23 PROCEDURE — 87040 BLOOD CULTURE FOR BACTERIA: CPT | Performed by: EMERGENCY MEDICINE

## 2024-12-23 PROCEDURE — 36600 WITHDRAWAL OF ARTERIAL BLOOD: CPT

## 2024-12-23 PROCEDURE — 80047 BASIC METABLC PNL IONIZED CA: CPT

## 2024-12-23 PROCEDURE — 82803 BLOOD GASES ANY COMBINATION: CPT

## 2024-12-23 PROCEDURE — 94799 UNLISTED PULMONARY SVC/PX: CPT

## 2024-12-23 PROCEDURE — 83605 ASSAY OF LACTIC ACID: CPT

## 2024-12-23 PROCEDURE — 80053 COMPREHEN METABOLIC PANEL: CPT | Performed by: EMERGENCY MEDICINE

## 2024-12-23 PROCEDURE — 94664 DEMO&/EVAL PT USE INHALER: CPT

## 2024-12-23 PROCEDURE — 25010000002 ENOXAPARIN PER 10 MG: Performed by: FAMILY MEDICINE

## 2024-12-23 PROCEDURE — 93005 ELECTROCARDIOGRAM TRACING: CPT | Performed by: EMERGENCY MEDICINE

## 2024-12-23 PROCEDURE — 25010000002 METHYLPREDNISOLONE PER 125 MG: Performed by: EMERGENCY MEDICINE

## 2024-12-23 PROCEDURE — 36415 COLL VENOUS BLD VENIPUNCTURE: CPT

## 2024-12-23 PROCEDURE — 99285 EMERGENCY DEPT VISIT HI MDM: CPT

## 2024-12-23 PROCEDURE — 84145 PROCALCITONIN (PCT): CPT | Performed by: FAMILY MEDICINE

## 2024-12-23 PROCEDURE — 71045 X-RAY EXAM CHEST 1 VIEW: CPT

## 2024-12-23 PROCEDURE — 83605 ASSAY OF LACTIC ACID: CPT | Performed by: EMERGENCY MEDICINE

## 2024-12-23 PROCEDURE — 25010000002 CEFTRIAXONE PER 250 MG: Performed by: EMERGENCY MEDICINE

## 2024-12-23 PROCEDURE — 94761 N-INVAS EAR/PLS OXIMETRY MLT: CPT

## 2024-12-23 PROCEDURE — 85025 COMPLETE CBC W/AUTO DIFF WBC: CPT | Performed by: EMERGENCY MEDICINE

## 2024-12-23 PROCEDURE — 99223 1ST HOSP IP/OBS HIGH 75: CPT | Performed by: FAMILY MEDICINE

## 2024-12-23 PROCEDURE — 83880 ASSAY OF NATRIURETIC PEPTIDE: CPT | Performed by: EMERGENCY MEDICINE

## 2024-12-23 PROCEDURE — 25010000002 METHYLPREDNISOLONE PER 40 MG: Performed by: FAMILY MEDICINE

## 2024-12-23 PROCEDURE — 94640 AIRWAY INHALATION TREATMENT: CPT

## 2024-12-23 PROCEDURE — 87637 SARSCOV2&INF A&B&RSV AMP PRB: CPT | Performed by: EMERGENCY MEDICINE

## 2024-12-23 PROCEDURE — 84484 ASSAY OF TROPONIN QUANT: CPT | Performed by: EMERGENCY MEDICINE

## 2024-12-23 RX ORDER — ONDANSETRON 4 MG/1
4 TABLET, ORALLY DISINTEGRATING ORAL EVERY 6 HOURS PRN
Status: DISCONTINUED | OUTPATIENT
Start: 2024-12-23 | End: 2024-12-24 | Stop reason: HOSPADM

## 2024-12-23 RX ORDER — OSELTAMIVIR PHOSPHATE 30 MG/1
30 CAPSULE ORAL EVERY 12 HOURS SCHEDULED
Status: DISCONTINUED | OUTPATIENT
Start: 2024-12-23 | End: 2024-12-24 | Stop reason: HOSPADM

## 2024-12-23 RX ORDER — ALBUTEROL SULFATE 0.83 MG/ML
2.5 SOLUTION RESPIRATORY (INHALATION)
Status: DISCONTINUED | OUTPATIENT
Start: 2024-12-23 | End: 2024-12-24 | Stop reason: HOSPADM

## 2024-12-23 RX ORDER — ATORVASTATIN CALCIUM 40 MG/1
40 TABLET, FILM COATED ORAL NIGHTLY
Status: DISCONTINUED | OUTPATIENT
Start: 2024-12-23 | End: 2024-12-24 | Stop reason: HOSPADM

## 2024-12-23 RX ORDER — FERROUS SULFATE 325(65) MG
325 TABLET ORAL
Status: DISCONTINUED | OUTPATIENT
Start: 2024-12-24 | End: 2024-12-24 | Stop reason: HOSPADM

## 2024-12-23 RX ORDER — SODIUM CHLORIDE 0.9 % (FLUSH) 0.9 %
10 SYRINGE (ML) INJECTION AS NEEDED
Status: DISCONTINUED | OUTPATIENT
Start: 2024-12-23 | End: 2024-12-24 | Stop reason: HOSPADM

## 2024-12-23 RX ORDER — AMOXICILLIN 250 MG
2 CAPSULE ORAL 2 TIMES DAILY PRN
Status: DISCONTINUED | OUTPATIENT
Start: 2024-12-23 | End: 2024-12-24 | Stop reason: HOSPADM

## 2024-12-23 RX ORDER — POLYETHYLENE GLYCOL 3350 17 G/17G
17 POWDER, FOR SOLUTION ORAL DAILY PRN
Status: DISCONTINUED | OUTPATIENT
Start: 2024-12-23 | End: 2024-12-24 | Stop reason: HOSPADM

## 2024-12-23 RX ORDER — IPRATROPIUM BROMIDE AND ALBUTEROL SULFATE 2.5; .5 MG/3ML; MG/3ML
3 SOLUTION RESPIRATORY (INHALATION)
Status: DISCONTINUED | OUTPATIENT
Start: 2024-12-23 | End: 2024-12-24 | Stop reason: HOSPADM

## 2024-12-23 RX ORDER — METHYLPREDNISOLONE SODIUM SUCCINATE 40 MG/ML
40 INJECTION, POWDER, LYOPHILIZED, FOR SOLUTION INTRAMUSCULAR; INTRAVENOUS EVERY 6 HOURS
Status: COMPLETED | OUTPATIENT
Start: 2024-12-23 | End: 2024-12-23

## 2024-12-23 RX ORDER — ACETAMINOPHEN 650 MG/1
650 SUPPOSITORY RECTAL EVERY 4 HOURS PRN
Status: DISCONTINUED | OUTPATIENT
Start: 2024-12-23 | End: 2024-12-24 | Stop reason: HOSPADM

## 2024-12-23 RX ORDER — SODIUM CHLORIDE 0.9 % (FLUSH) 0.9 %
10 SYRINGE (ML) INJECTION EVERY 12 HOURS SCHEDULED
Status: DISCONTINUED | OUTPATIENT
Start: 2024-12-23 | End: 2024-12-24 | Stop reason: HOSPADM

## 2024-12-23 RX ORDER — BISACODYL 10 MG
10 SUPPOSITORY, RECTAL RECTAL DAILY PRN
Status: DISCONTINUED | OUTPATIENT
Start: 2024-12-23 | End: 2024-12-24 | Stop reason: HOSPADM

## 2024-12-23 RX ORDER — ONDANSETRON 2 MG/ML
4 INJECTION INTRAMUSCULAR; INTRAVENOUS EVERY 6 HOURS PRN
Status: DISCONTINUED | OUTPATIENT
Start: 2024-12-23 | End: 2024-12-24 | Stop reason: HOSPADM

## 2024-12-23 RX ORDER — ENOXAPARIN SODIUM 100 MG/ML
40 INJECTION SUBCUTANEOUS NIGHTLY
Status: DISCONTINUED | OUTPATIENT
Start: 2024-12-23 | End: 2024-12-24 | Stop reason: HOSPADM

## 2024-12-23 RX ORDER — NYSTATIN 100000 [USP'U]/G
POWDER TOPICAL EVERY 12 HOURS SCHEDULED
Status: DISCONTINUED | OUTPATIENT
Start: 2024-12-23 | End: 2024-12-24 | Stop reason: HOSPADM

## 2024-12-23 RX ORDER — ACETAMINOPHEN 160 MG/5ML
650 SOLUTION ORAL EVERY 4 HOURS PRN
Status: DISCONTINUED | OUTPATIENT
Start: 2024-12-23 | End: 2024-12-24 | Stop reason: HOSPADM

## 2024-12-23 RX ORDER — ARFORMOTEROL TARTRATE 15 UG/2ML
15 SOLUTION RESPIRATORY (INHALATION)
Status: DISCONTINUED | OUTPATIENT
Start: 2024-12-23 | End: 2024-12-24 | Stop reason: HOSPADM

## 2024-12-23 RX ORDER — BISACODYL 5 MG/1
5 TABLET, DELAYED RELEASE ORAL DAILY PRN
Status: DISCONTINUED | OUTPATIENT
Start: 2024-12-23 | End: 2024-12-24 | Stop reason: HOSPADM

## 2024-12-23 RX ORDER — SUMATRIPTAN 50 MG/1
100 TABLET, FILM COATED ORAL AS NEEDED
Status: DISCONTINUED | OUTPATIENT
Start: 2024-12-23 | End: 2024-12-24 | Stop reason: HOSPADM

## 2024-12-23 RX ORDER — ASPIRIN 81 MG/1
81 TABLET, CHEWABLE ORAL NIGHTLY
Status: DISCONTINUED | OUTPATIENT
Start: 2024-12-23 | End: 2024-12-24 | Stop reason: HOSPADM

## 2024-12-23 RX ORDER — BUDESONIDE 0.5 MG/2ML
0.5 INHALANT ORAL
Status: DISCONTINUED | OUTPATIENT
Start: 2024-12-23 | End: 2024-12-24 | Stop reason: HOSPADM

## 2024-12-23 RX ORDER — METHYLPREDNISOLONE SODIUM SUCCINATE 125 MG/2ML
125 INJECTION, POWDER, LYOPHILIZED, FOR SOLUTION INTRAMUSCULAR; INTRAVENOUS ONCE
Status: COMPLETED | OUTPATIENT
Start: 2024-12-23 | End: 2024-12-23

## 2024-12-23 RX ORDER — SODIUM CHLORIDE 9 MG/ML
40 INJECTION, SOLUTION INTRAVENOUS AS NEEDED
Status: DISCONTINUED | OUTPATIENT
Start: 2024-12-23 | End: 2024-12-24 | Stop reason: HOSPADM

## 2024-12-23 RX ORDER — ACETAMINOPHEN 325 MG/1
975 TABLET ORAL ONCE
Status: COMPLETED | OUTPATIENT
Start: 2024-12-23 | End: 2024-12-23

## 2024-12-23 RX ORDER — OSELTAMIVIR PHOSPHATE 75 MG/1
75 CAPSULE ORAL ONCE
Status: COMPLETED | OUTPATIENT
Start: 2024-12-23 | End: 2024-12-23

## 2024-12-23 RX ORDER — IPRATROPIUM BROMIDE AND ALBUTEROL SULFATE 2.5; .5 MG/3ML; MG/3ML
3 SOLUTION RESPIRATORY (INHALATION) ONCE
Status: COMPLETED | OUTPATIENT
Start: 2024-12-23 | End: 2024-12-23

## 2024-12-23 RX ORDER — ACETAMINOPHEN 325 MG/1
650 TABLET ORAL EVERY 4 HOURS PRN
Status: DISCONTINUED | OUTPATIENT
Start: 2024-12-23 | End: 2024-12-24 | Stop reason: HOSPADM

## 2024-12-23 RX ORDER — AZITHROMYCIN 250 MG/1
500 TABLET, FILM COATED ORAL ONCE
Status: COMPLETED | OUTPATIENT
Start: 2024-12-23 | End: 2024-12-23

## 2024-12-23 RX ORDER — PREDNISONE 20 MG/1
40 TABLET ORAL
Status: DISCONTINUED | OUTPATIENT
Start: 2024-12-24 | End: 2024-12-24 | Stop reason: HOSPADM

## 2024-12-23 RX ORDER — NICOTINE 21 MG/24HR
1 PATCH, TRANSDERMAL 24 HOURS TRANSDERMAL
Status: DISCONTINUED | OUTPATIENT
Start: 2024-12-23 | End: 2024-12-24 | Stop reason: HOSPADM

## 2024-12-23 RX ORDER — AZITHROMYCIN 250 MG/1
500 TABLET, FILM COATED ORAL
Status: DISCONTINUED | OUTPATIENT
Start: 2024-12-24 | End: 2024-12-24 | Stop reason: HOSPADM

## 2024-12-23 RX ADMIN — IPRATROPIUM BROMIDE AND ALBUTEROL SULFATE 3 ML: .5; 3 SOLUTION RESPIRATORY (INHALATION) at 05:46

## 2024-12-23 RX ADMIN — OSELTAMIVIR PHOSPHATE 30 MG: 30 CAPSULE ORAL at 20:06

## 2024-12-23 RX ADMIN — BUDESONIDE 0.5 MG: 0.5 INHALANT RESPIRATORY (INHALATION) at 09:11

## 2024-12-23 RX ADMIN — METHYLPREDNISOLONE SODIUM SUCCINATE 40 MG: 40 INJECTION, POWDER, FOR SOLUTION INTRAMUSCULAR; INTRAVENOUS at 11:46

## 2024-12-23 RX ADMIN — NYSTATIN: 100000 POWDER TOPICAL at 13:16

## 2024-12-23 RX ADMIN — IPRATROPIUM BROMIDE AND ALBUTEROL SULFATE 3 ML: .5; 3 SOLUTION RESPIRATORY (INHALATION) at 09:11

## 2024-12-23 RX ADMIN — ACETAMINOPHEN 650 MG: 325 TABLET ORAL at 13:16

## 2024-12-23 RX ADMIN — Medication 10 ML: at 20:06

## 2024-12-23 RX ADMIN — ACETAMINOPHEN 975 MG: 325 TABLET ORAL at 05:48

## 2024-12-23 RX ADMIN — IPRATROPIUM BROMIDE AND ALBUTEROL SULFATE 3 ML: .5; 3 SOLUTION RESPIRATORY (INHALATION) at 11:51

## 2024-12-23 RX ADMIN — ARFORMOTEROL TARTRATE 15 MCG: 15 SOLUTION RESPIRATORY (INHALATION) at 18:42

## 2024-12-23 RX ADMIN — METHYLPREDNISOLONE SODIUM SUCCINATE 125 MG: 125 INJECTION, POWDER, FOR SOLUTION INTRAMUSCULAR; INTRAVENOUS at 05:48

## 2024-12-23 RX ADMIN — IPRATROPIUM BROMIDE AND ALBUTEROL SULFATE 3 ML: .5; 3 SOLUTION RESPIRATORY (INHALATION) at 23:33

## 2024-12-23 RX ADMIN — ENOXAPARIN SODIUM 40 MG: 100 INJECTION SUBCUTANEOUS at 20:06

## 2024-12-23 RX ADMIN — BUDESONIDE 0.5 MG: 0.5 INHALANT RESPIRATORY (INHALATION) at 18:42

## 2024-12-23 RX ADMIN — IPRATROPIUM BROMIDE AND ALBUTEROL SULFATE 3 ML: .5; 3 SOLUTION RESPIRATORY (INHALATION) at 05:47

## 2024-12-23 RX ADMIN — Medication 10 ML: at 11:47

## 2024-12-23 RX ADMIN — IPRATROPIUM BROMIDE AND ALBUTEROL SULFATE 3 ML: .5; 3 SOLUTION RESPIRATORY (INHALATION) at 18:42

## 2024-12-23 RX ADMIN — SODIUM CHLORIDE 1000 MG: 9 INJECTION INTRAMUSCULAR; INTRAVENOUS; SUBCUTANEOUS at 07:28

## 2024-12-23 RX ADMIN — ATORVASTATIN CALCIUM 40 MG: 40 TABLET, FILM COATED ORAL at 20:06

## 2024-12-23 RX ADMIN — IPRATROPIUM BROMIDE AND ALBUTEROL SULFATE 3 ML: .5; 3 SOLUTION RESPIRATORY (INHALATION) at 16:00

## 2024-12-23 RX ADMIN — ARFORMOTEROL TARTRATE 15 MCG: 15 SOLUTION RESPIRATORY (INHALATION) at 09:11

## 2024-12-23 RX ADMIN — AZITHROMYCIN DIHYDRATE 500 MG: 250 TABLET, FILM COATED ORAL at 07:29

## 2024-12-23 RX ADMIN — IPRATROPIUM BROMIDE AND ALBUTEROL SULFATE 3 ML: .5; 3 SOLUTION RESPIRATORY (INHALATION) at 06:22

## 2024-12-23 RX ADMIN — LEVOTHYROXINE SODIUM 125 MCG: 0.12 TABLET ORAL at 11:46

## 2024-12-23 RX ADMIN — ASPIRIN 81 MG 81 MG: 81 TABLET ORAL at 20:06

## 2024-12-23 RX ADMIN — NICOTINE 1 PATCH: 21 PATCH, EXTENDED RELEASE TRANSDERMAL at 13:16

## 2024-12-23 RX ADMIN — OSELTAMIVIR PHOSPHATE 75 MG: 75 CAPSULE ORAL at 11:46

## 2024-12-23 RX ADMIN — METHYLPREDNISOLONE SODIUM SUCCINATE 40 MG: 40 INJECTION, POWDER, FOR SOLUTION INTRAMUSCULAR; INTRAVENOUS at 17:47

## 2024-12-23 NOTE — PLAN OF CARE
Goal Outcome Evaluation:  Plan of Care Reviewed With: patient        Progress: improving  Outcome Evaluation: Patient had no complaints of pain or discomfort, vitals stable, getting up ad vikram to bathroom.

## 2024-12-23 NOTE — ED PROVIDER NOTES
Time: 6:02 AM EST  Date of encounter:  12/23/2024  Independent Historian/Clinical History and Information was obtained by:   Patient    History is limited by: N/A    Chief Complaint: shortness of breath      History of Present Illness:  Patient is a 64 y.o. year old female who presents to the emergency department for evaluation of Shortness of breath.  Patient states symptoms started on Saturday.  She reports productive cough with yellow sputum.  She denies any known sick contacts.  She reports subjective fevers.  No other complaints.      Patient Care Team  Primary Care Provider: Cherelle Jones APRN    Past Medical History:     Allergies   Allergen Reactions    Penicillins Hives     Past Medical History:   Diagnosis Date    Anemia     B12 deficiency     COPD (chronic obstructive pulmonary disease)     Heart murmur     Hyperlipidemia     Hypertension     Hypothyroidism     Migraines     Positive colorectal cancer screening using Cologuard test      Past Surgical History:   Procedure Laterality Date    APPENDECTOMY      CHOLECYSTECTOMY      COLONOSCOPY N/A 3/20/2023    Procedure: COLONOSCOPY with forcep polypectomy;  Surgeon: Chan Chaparro MD;  Location: [facility] ENDOSCOPY;  Service: General;  Laterality: N/A;  colon polyp    PACEMAKER IMPLANTATION      TUBAL ABDOMINAL LIGATION       Family History   Problem Relation Age of Onset    No Known Problems Mother     Heart attack Father     Malig Hyperthermia Neg Hx        Home Medications:  Prior to Admission medications    Medication Sig Start Date End Date Taking? Authorizing Provider   albuterol sulfate  (90 Base) MCG/ACT inhaler Inhale 2 puffs Every 4 (Four) Hours As Needed for Wheezing. 8/20/24   Cherelle Jones APRN   alendronate (Fosamax) 70 MG tablet Take 1 tablet by mouth Every 7 (Seven) Days. 8/20/24   Cherelle Jones APRN   amLODIPine (Norvasc) 5 MG tablet Take 1 tablet by mouth Daily. 12/3/24   Cherelle Jones APRN    aspirin 81 MG chewable tablet Chew 1 tablet Every Night.    ProviderChirag MD   atorvastatin (LIPITOR) 40 MG tablet Take 1 tablet by mouth Every Night. 8/20/24   Cherelle Jones APRN   azithromycin (Zithromax Z-Jesus) 250 MG tablet Take 2 tablets by mouth on day 1, then 1 tablet daily on days 2-5 11/25/24   Cherelle Jones APRN   cyanocobalamin (VITAMIN B-12) 1000 MCG tablet Take 1 tablet by mouth Daily. 1/7/21   Chirag Leyva MD   ferrous sulfate 325 (65 FE) MG tablet Take 1 tablet by mouth Daily With Breakfast.    Chirag Leyva MD   fluticasone (FLONASE) 50 MCG/ACT nasal spray 2 sprays into the nostril(s) as directed by provider Daily. 11/27/23   Nay Agarwal APRN   Fluticasone Furoate-Vilanterol (Breo Ellipta) 100-25 MCG/ACT aerosol powder  Inhale 1 puff Daily. 8/20/24   Cherelle Jones APRN   ipratropium-albuterol (DUO-NEB) 0.5-2.5 mg/3 ml nebulizer Take 3 mL by nebulization 2 (Two) Times a Day. And as needed 11/27/23   Nay Agarwal APRN   levothyroxine (SYNTHROID, LEVOTHROID) 125 MCG tablet Take 1 tablet by mouth Daily. 8/20/24   Cherelle Jones APRN   loratadine (Claritin) 10 MG tablet Take 1 tablet by mouth Daily. 11/27/23   Nay Agarwal APRN   metoprolol succinate XL (TOPROL-XL) 25 MG 24 hr tablet Take 1 tablet by mouth Daily. 8/20/24   Cherelle Jones APRN   ondansetron ODT (ZOFRAN-ODT) 4 MG disintegrating tablet Place 1 tablet on the tongue 4 (Four) Times a Day As Needed for Nausea or Vomiting. 4/22/24   Francoise Romeo APRN   SUMAtriptan (IMITREX) 100 MG tablet Take 1 tablet by mouth As Needed for Migraine. 8/20/24   Cherelle Jones APRN        Social History:   Social History     Tobacco Use    Smoking status: Every Day     Current packs/day: 1.00     Average packs/day: 1 pack/day for 42.0 years (42.0 ttl pk-yrs)     Types: Cigarettes     Start date: 1983    Smokeless tobacco: Never   Vaping Use    Vaping status:  "Never Used   Substance Use Topics    Alcohol use: Never    Drug use: Never         Review of Systems:  Review of Systems   Constitutional:  Positive for chills and fever.   HENT:  Negative for congestion, ear pain and sore throat.    Eyes:  Negative for pain.   Respiratory:  Positive for cough and shortness of breath. Negative for chest tightness.    Cardiovascular:  Negative for chest pain.   Gastrointestinal:  Negative for abdominal pain, diarrhea, nausea and vomiting.   Genitourinary:  Negative for flank pain and hematuria.   Musculoskeletal:  Negative for joint swelling.   Skin:  Negative for pallor.   Neurological:  Negative for seizures and headaches.   All other systems reviewed and are negative.       Physical Exam:  /69 (Patient Position: Lying)   Pulse 116   Temp 100.4 °F (38 °C) (Oral)   Resp 27   Ht 160 cm (63\")   Wt 81 kg (178 lb 9.2 oz)   SpO2 94%   BMI 31.63 kg/m²     Physical Exam  Constitutional:       Appearance: Normal appearance.   HENT:      Head: Normocephalic and atraumatic.      Nose: Nose normal.      Mouth/Throat:      Mouth: Mucous membranes are moist.   Eyes:      Extraocular Movements: Extraocular movements intact.      Conjunctiva/sclera: Conjunctivae normal.      Pupils: Pupils are equal, round, and reactive to light.   Cardiovascular:      Rate and Rhythm: Normal rate and regular rhythm.      Pulses: Normal pulses.      Heart sounds: Normal heart sounds.   Pulmonary:      Effort: Pulmonary effort is normal.      Breath sounds: Decreased breath sounds and wheezing present.   Abdominal:      General: There is no distension.      Palpations: Abdomen is soft.      Tenderness: There is no abdominal tenderness.   Musculoskeletal:         General: Normal range of motion.      Cervical back: Normal range of motion.   Skin:     General: Skin is warm and dry.      Capillary Refill: Capillary refill takes less than 2 seconds.   Neurological:      General: No focal deficit present. "      Mental Status: She is alert and oriented to person, place, and time. Mental status is at baseline.   Psychiatric:         Mood and Affect: Mood normal.         Behavior: Behavior normal.                    Medical Decision Making:      Comorbidities that affect care:    COPD, Hypertension, Thyroid Disease    External Notes reviewed:    Previous Clinic Note: Patient seen by her PCP on 11/25/2024 for chronic obstructive pulmonary disease with acute exacerbation, hypertension, acute nonrecurrent frontal sinusitis, nicotine dependence.      The following orders were placed and all results were independently analyzed by me:  Orders Placed This Encounter   Procedures    COVID-19, FLU A/B, RSV PCR 1 HR TAT - Swab, Nasopharynx    Blood Culture - Blood,    Blood Culture - Blood,    XR Chest 1 View    [place] Draw    Comprehensive Metabolic Panel    BNP    High Sensitivity Troponin T    CBC Auto Differential    Arterial Blood Gas,H&H,Lytes,Lactate    Blood Gas, Arterial -    High Sensitivity Troponin T 1Hr    Lactic Acid, Plasma    NPO Diet NPO Type: Strict NPO    Undress & Gown    Continuous Pulse Oximetry    Vital Signs    Inpatient Hospitalist Consult    Oxygen Therapy- Nasal Cannula; Titrate 1-6 LPM Per SpO2; 90 - 95%    POC Chem Panel    POC Lactate    ECG 12 Lead ED Triage Standing Order; SOA    Insert Peripheral IV    CBC & Differential    Green Top (Gel)    Lavender Top    Gold Top - SST    Light Blue Top       Medications Given in the Emergency Department:  Medications   sodium chloride 0.9 % flush 10 mL (has no administration in time range)   cefTRIAXone (ROCEPHIN) in NS 1 gram/10ml IV PUSH syringe (has no administration in time range)   azithromycin (ZITHROMAX) tablet 500 mg (has no administration in time range)   ipratropium-albuterol (DUO-NEB) nebulizer solution 3 mL (3 mL Nebulization Given 12/23/24 4089)   ipratropium-albuterol (DUO-NEB) nebulizer solution 3 mL (3 mL Nebulization Given 12/23/24 0677)    acetaminophen (TYLENOL) tablet 975 mg (975 mg Oral Given 12/23/24 0548)   methylPREDNISolone sodium succinate (SOLU-Medrol) injection 125 mg (125 mg Intravenous Given 12/23/24 0548)   ipratropium-albuterol (DUO-NEB) nebulizer solution 3 mL (3 mL Nebulization Given 12/23/24 0622)        ED Course:    ED Course as of 12/23/24 0724   Mon Dec 23, 2024   0604 ECG 12 Lead ED Triage Standing Order; SOA  Sinus tachycardia with rate of 106.  No definite ST elevation.  Mild ST depression in lateral leads.  Artifact present.  Nonspecific T wave abnormality.  Normal MI and QTc.  EKG interpreted by me [LD]      ED Course User Index  [LD] Amanda Wilson MD       Labs:    Lab Results (last 24 hours)       Procedure Component Value Units Date/Time    CBC & Differential [[id number]]  (Abnormal) Collected: 12/23/24 0542    Specimen: Blood from Arm, Left Updated: 12/23/24 0550    Narrative:      The following orders were created for panel order CBC & Differential.  Procedure                               Abnormality         Status                     ---------                               -----------         ------                     CBC Auto Differential[[id number]]        Abnormal            Final result                 Please view results for these tests on the individual orders.    Comprehensive Metabolic Panel [[id number]]  (Abnormal) Collected: 12/23/24 0542    Specimen: Blood from Arm, Left Updated: 12/23/24 0613     Glucose 115 mg/dL      BUN 7 mg/dL      Creatinine 1.05 mg/dL      Sodium 135 mmol/L      Potassium 4.1 mmol/L      Chloride 97 mmol/L      CO2 28.4 mmol/L      Calcium 9.3 mg/dL      Total Protein 7.3 g/dL      Albumin 4.0 g/dL      ALT (SGPT) 13 U/L      AST (SGOT) 20 U/L      Alkaline Phosphatase 133 U/L      Total Bilirubin 0.4 mg/dL      Globulin 3.3 gm/dL      A/G Ratio 1.2 g/dL      BUN/Creatinine Ratio 6.7     Anion Gap 9.6 mmol/L      eGFR 59.8 mL/min/1.73     Narrative:      GFR Categories in  Chronic Kidney Disease (CKD)      GFR Category          GFR (mL/min/1.73)    Interpretation  G1                     90 or greater         Normal or high (1)  G2                      60-89                Mild decrease (1)  G3a                   45-59                Mild to moderate decrease  G3b                   30-44                Moderate to severe decrease  G4                    15-29                Severe decrease  G5                    14 or less           Kidney failure          (1)In the absence of evidence of kidney disease, neither GFR category G1 or G2 fulfill the criteria for CKD.    eGFR calculation 2021 CKD-EPI creatinine equation, which does not include race as a factor    BNP [[id number]]  (Abnormal) Collected: 12/23/24 0542    Specimen: Blood from Arm, Left Updated: 12/23/24 0611     proBNP 2,548.0 pg/mL     Narrative:      This assay is used as an aid in the diagnosis of individuals suspected of having heart failure. It can be used as an aid in the diagnosis of acute decompensated heart failure (ADHF) in patients presenting with signs and symptoms of ADHF to the emergency department (ED). In addition, NT-proBNP of <300 pg/mL indicates ADHF is not likely.    Age Range Result Interpretation  NT-proBNP Concentration (pg/mL:      <50             Positive            >450                   Gray                 300-450                    Negative             <300    50-75           Positive            >900                  Gray                300-900                  Negative            <300      >75             Positive            >1800                  Gray                300-1800                  Negative            <300    High Sensitivity Troponin T [[id number]]  (Normal) Collected: 12/23/24 0542    Specimen: Blood from Arm, Left Updated: 12/23/24 0612     HS Troponin T 9 ng/L     Narrative:      High Sensitive Troponin T Reference Range:  <14.0 ng/L- Negative Female for AMI  <22.0 ng/L- Negative Male  for AMI  >=14 - Abnormal Female indicating possible myocardial injury.  >=22 - Abnormal Male indicating possible myocardial injury.   Clinicians would have to utilize clinical acumen, EKG, Troponin, and serial changes to determine if it is an Acute Myocardial Infarction or myocardial injury due to an underlying chronic condition.         CBC Auto Differential [[id number]]  (Abnormal) Collected: 12/23/24 0542    Specimen: Blood from Arm, Left Updated: 12/23/24 0550     WBC 5.29 10*3/mm3      RBC 5.43 10*6/mm3      Hemoglobin 15.5 g/dL      Hematocrit 47.7 %      MCV 87.8 fL      MCH 28.5 pg      MCHC 32.5 g/dL      RDW 14.1 %      RDW-SD 45.3 fl      MPV 10.9 fL      Platelets 152 10*3/mm3      Neutrophil % 85.8 %      Lymphocyte % 5.1 %      Monocyte % 8.3 %      Eosinophil % 0.0 %      Basophil % 0.4 %      Immature Grans % 0.4 %      Neutrophils, Absolute 4.54 10*3/mm3      Lymphocytes, Absolute 0.27 10*3/mm3      Monocytes, Absolute 0.44 10*3/mm3      Eosinophils, Absolute 0.00 10*3/mm3      Basophils, Absolute 0.02 10*3/mm3      Immature Grans, Absolute 0.02 10*3/mm3      nRBC 0.0 /100 WBC     POC Chem Panel [[id number]]  (Abnormal) Collected: 12/23/24 0552    Specimen: Arterial Blood Updated: 12/23/24 0555     Glucose 120 mg/dL      Comment: Serial Number: [id number]:  [id number]        Sodium 136 mmol/L      POC Potassium 4.0 mmol/L      Ionized Calcium 1.14 mmol/L      Chloride 98 mmol/L      Creatinine 0.99 mg/dL      BUN 8 mg/dL      CO2 Content 26.6 mmol/L     Blood Gas, Arterial - [[id number]]  (Abnormal) Collected: 12/23/24 0552    Specimen: Arterial Blood Updated: 12/23/24 0555     Site Left Brachial     Arthur's Test N/A     pH, Arterial 7.392 pH units      pCO2, Arterial 45.2 mm Hg      pO2, Arterial 80.9 mm Hg      HCO3, Arterial 27.5 mmol/L      Base Excess, Arterial 1.9 mmol/L      Comment: Serial Number: [id number]:  [id number]        O2 Saturation, Arterial 95.7 %      Hemoglobin, Blood Gas 16.5 g/dL       Hematocrit, Blood Gas 48.0 %      Barometric Pressure for Blood Gas 754.0000 mmHg      Modality Cannula     FIO2 28 %      Flow Rate 2.0000 lpm      Hemodilution No     PO2/FIO2 289    POC Lactate [[id number]]  (Normal) Collected: 12/23/24 0552    Specimen: Arterial Blood Updated: 12/23/24 0555     Lactate 0.7 mmol/L      Comment: Serial Number: [id number]:  [id number]       COVID-19, FLU A/B, RSV PCR 1 HR TAT - Swab, Nasopharynx [[id number]]  (Abnormal) Collected: 12/23/24 0609    Specimen: Swab from Nasopharynx Updated: 12/23/24 0658     COVID19 Not Detected     Influenza A PCR Detected     Influenza B PCR Not Detected     RSV, PCR Not Detected    Narrative:      Fact sheet for providers: https://www.fda.gov/media/393849/download    Fact sheet for patients: https://www.fda.gov/media/941607/download    Test performed by PCR.    High Sensitivity Troponin T 1Hr [[id number]] Collected: 12/23/24 0700    Specimen: Blood from Arm, Left Updated: 12/23/24 0707    Lactic Acid, Plasma [[id number]]  (Normal) Collected: 12/23/24 0700    Specimen: Blood from Arm, Left Updated: 12/23/24 0723     Lactate 0.8 mmol/L     Blood Culture - Blood, Arm, Right [[id number]] Collected: 12/23/24 0700    Specimen: Blood from Arm, Right Updated: 12/23/24 0707    Blood Culture - Blood, Arm, Left [[id number]] Collected: 12/23/24 0700    Specimen: Blood from Arm, Left Updated: 12/23/24 0707             Imaging:    XR Chest 1 View    Result Date: 12/23/2024  AP PORTABLE CHEST  HISTORY: Shortness of air.  COMPARISON: 6/5/2024  TECHNIQUE: AP portable chest x-ray.  FINDINGS: Heart is enlarged. There is atherosclerotic disease in the aorta. Left-sided dual-lead pacer is unchanged. There is emphysema. No acute infiltrates are identified. There is no pneumothorax. Pulmonary vascularity is normal.      No acute cardiopulmonary findings.  Electronically Signed By-Dr. Terell Solares MD On:12/23/2024 5:10 AM         Differential Diagnosis and  Discussion:    Cough: Differential diagnosis includes but is not limited to pneumonia, acute bronchitis, upper respiratory infection, ACE inhibitor use, allergic reaction, epiglottitis, seasonal allergies, chemical irritants, exercise-induced asthma, viral syndrome.  Dyspnea: Differential diagnosis includes but is not limited to metabolic acidosis, neurological disorders, psychogenic, asthma, pneumothorax, upper airway obstruction, COPD, pneumonia, noncardiogenic pulmonary edema, interstitial lung disease, anemia, congestive heart failure, and pulmonary embolism    PROCEDURES:    Labs were collected in the emergency department and all labs were reviewed and interpreted by me.  X-ray were performed in the emergency department and all X-ray impressions were independently interpreted by me.  An EKG was performed and the EKG was interpreted by me.    ECG 12 Lead ED Triage Standing Order; SOA   Preliminary Result   HEART UKKF=983  bpm   RR Mkexmapy=385  ms   RI Jnlafojz=657  ms   P Horizontal Axis=  deg   P Front Axis=84  deg   QRSD Interval=88  ms   QT Qrlzoucb=557  ms   NNgT=538  ms   QRS Axis=54  deg   T Wave Axis=56  deg   - ABNORMAL ECG -   Sinus tachycardia   Probable left atrial enlargement   Nonspecific repol abnormality, diffuse leads   Date and Time of Study:2024-12-23 04:55:56          Procedures    MDM  Number of Diagnoses or Management Options  Bronchitis  COPD with acute exacerbation  Diagnosis management comments: Patient presents to the emergency department with shortness of breath.  O2 sat mid 80s on room air.  She was placed on supplemental oxygen.  Patient was given nebulizer treatment with mild improvement.  On reevaluation patient's O2 sat still dropped to the 80s without supplemental oxygen.  X-ray did not show acute finding.  Exam concerning for pneumonia.  Will treat her with antibiotics.  Discussed patient with hospitalist and will admit for further care.       Amount and/or Complexity of Data  Reviewed  Clinical lab tests: reviewed  Tests in the radiology section of CPT®: reviewed  Review and summarize past medical records: yes  Independent visualization of images, tracings, or specimens: yes    Risk of Complications, Morbidity, and/or Mortality  Presenting problems: moderate  Management options: moderate                       Patient Care Considerations:    CT HEAD: I considered ordering a noncontrast CT of the head, however patient is alert and oriented with no focal deficits      Consultants/Shared Management Plan:    Hospitalist: I have discussed the case with Dr. Caicedo who agrees to accept the patient for admission.    Social Determinants of Health:    Patient is independent, reliable, and has access to care.       Disposition and Care Coordination:    Admit:   Through independent evaluation of the patient's history, physical, and imperical data, the patient meets criteria for inpatient admission to the hospital.        Final diagnoses:   COPD with acute exacerbation   Bronchitis        ED Disposition       ED Disposition   Decision to Admit    Condition   --    Comment   --               This medical record created using voice recognition software.             Marvin Arroyo MD  12/23/24 0765

## 2024-12-23 NOTE — H&P
[facility]   HOSPITALIST HISTORY AND PHYSICAL  Date: 2024   Patient Name: Italia Beard  : 1960  MRN: [id number]  Primary Care Physician:  Cherelle Jones APRN  Date of admission: 2024    Subjective   Subjective     Chief Complaint: Shortness of breath    HPI:    Italia Beard is a 64 y.o. female past medical history significant for COPD not on home O2, hypertension, hyperlipidemia, hypothyroidism presents with 1 week history of gradually worsening shortness of breath with associated cough becoming more productive of yellow sputum.  Patient denies any alleviating factors.  Patient presented the emergency department for further evaluation and treatment.  Patient found to be febrile to 100.4.  Sinus rhythm mixed with paced beats 100 on telemetry review.  Blood pressure within normal limits.  Patient satting 87% on room air.  Required 2 L nasal cannula to keep sats greater than 90%.  ABG demonstrated mild chronic hypercapnia with pCO2 45.  BNP elevated 2500.  White blood cell count within normal limits.  Pro-Scott within normal limits.  Flu a positive by PCR.  Chest x-ray negative for acute cardiopulmonary findings.  Exam revealed diminished breath sounds and wheezing bilaterally.  Patient given DuoNebs Solu-Medrol and empiric antibiotics. Patient continued to require 2 and half to 3 L nasal cannula keep sats greater than 90%.  Hospitalist service contacted for admission for further evaluation and treatment of acute hypoxic respiratory failure due to COPD exacerbation triggered by influenza A infection.  Continued on supplemental oxygen, systemic steroids and inhaled bronchodilators.  Started on Tamiflu.  Continued on azithromycin.  Patient planning to return home once respiratory status improves.    Personal History     Past Medical History:  Past Medical History:   Diagnosis Date    Anemia     B12 deficiency     COPD (chronic obstructive pulmonary disease)     Heart  murmur     Hyperlipidemia     Hypertension     Hypothyroidism     Migraines     Positive colorectal cancer screening using Cologuard test         Past Surgical History:  Past Surgical History:   Procedure Laterality Date    APPENDECTOMY      CHOLECYSTECTOMY      COLONOSCOPY N/A 3/20/2023    Procedure: COLONOSCOPY with forcep polypectomy;  Surgeon: Chan Chaparro MD;  Location: [facility] ENDOSCOPY;  Service: General;  Laterality: N/A;  colon polyp    PACEMAKER IMPLANTATION      TUBAL ABDOMINAL LIGATION          Family History:   Family History   Problem Relation Age of Onset    No Known Problems Mother     Heart attack Father     Malig Hyperthermia Neg Hx         Social History:   Social History     Socioeconomic History    Marital status:    Tobacco Use    Smoking status: Former     Current packs/day: 1.50     Average packs/day: 1.5 packs/day for 42.0 years (63.0 ttl pk-yrs)     Types: Cigarettes     Start date: 1983     Passive exposure: Current    Smokeless tobacco: Never   Vaping Use    Vaping status: Never Used   Substance and Sexual Activity    Alcohol use: Never    Drug use: Never    Sexual activity: Yes     Partners: Male     Birth control/protection: Post-menopausal        Home Medications:  Fluticasone Furoate-Vilanterol, SUMAtriptan, albuterol sulfate HFA, alendronate, amLODIPine, aspirin, atorvastatin, ferrous sulfate, levothyroxine, and metoprolol succinate XL    Allergies:  Allergies   Allergen Reactions    Bee Venom Anaphylaxis    Penicillins Hives       Review of Systems   Constitutional: Negative for fatigue and positive fever.   HENT: Negative for sore throat and trouble swallowing.    Eyes: Negative for pain and discharge.   Respiratory: Positive for cough and shortness of breath.    Cardiovascular: Negative for chest pain and palpitations.   Gastrointestinal: Negative for abdominal pain, nausea and vomiting.   Endocrine: Negative for cold intolerance and heat intolerance.   Genitourinary:  Negative for difficulty urinating and dysuria.   Musculoskeletal: Negative for back pain and neck stiffness.   Skin: Negative for color change and rash.   Neurological: Negative for syncope and headaches.   Hematological: Negative for adenopathy.   Psychiatric/Behavioral: Negative for confusion and hallucinations.    Objective   Objective     Vitals:   Temp:  [97.6 °F (36.4 °C)-100.4 °F (38 °C)] 97.6 °F (36.4 °C)  Heart Rate:  [] 85  Resp:  [18-27] 18  BP: ()/(57-96) 104/66  Flow (L/min) (Oxygen Therapy):  [2-3] 2.5    Physical Exam   Gen. well-developed appearing stated age in no acute distress  HEENT: Normocephalic atraumatic moist membranes pupils equal round reactive light, no scleral icterus no conjunctival injection  Cardiovascular: regular rate and rhythm no murmurs rubs or gallops S1-S2, no lower extremity edema appreciated  Pulmonary: Wheezes bilaterally with prolonged expiratory phase and scant rhonchi symmetric chest expansion, unlabored, no conversational dyspnea appreciated  Gastrointestinal: Soft nontender nondistended positive bowel sounds all 4 quadrants no rebound or guarding  Musculoskeletal: No clubbing cyanosis, warm and well-perfused, calves soft symmetric nontender bilaterally  Skin: Clean dry without rashes  Neuro: Cranial nerves II through XII intact grossly no sensorimotor deficits appreciated bilateral upper and lower extremities  Psych: Patient is calm cooperative and appropriate with exam not responding to internal stimuli  : No Barnhart catheter no bladder distention no suprapubic tenderness    Result Review    Result Review:  I have personally reviewed the results from the time of this admission to 12/23/2024 17:03 EST and agree with these findings:  [x]  Laboratory  LAB RESULTS:      Lab 12/23/24  0700 12/23/24  0552 12/23/24  0542   WBC  --   --  5.29   HEMOGLOBIN  --   --  15.5   HEMATOCRIT  --   --  47.7*   PLATELETS  --   --  152   NEUTROS ABS  --   --  4.54   IMMATURE  GRANS (ABS)  --   --  0.02   LYMPHS ABS  --   --  0.27*   MONOS ABS  --   --  0.44   EOS ABS  --   --  0.00   MCV  --   --  87.8   PROCALCITONIN  --   --  0.12   LACTATE 0.8 0.7  --          Lab 12/23/24  0552 12/23/24  0542   SODIUM  --  135*   POTASSIUM  --  4.1   CHLORIDE  --  97*   CO2  --  28.4   ANION GAP  --  9.6   BUN  --  7*   CREATININE 0.99 1.05*   EGFR  --  59.8*   GLUCOSE  --  115*   CALCIUM  --  9.3         Lab 12/23/24  0542   TOTAL PROTEIN 7.3   ALBUMIN 4.0   GLOBULIN 3.3   ALT (SGPT) 13   AST (SGOT) 20   BILIRUBIN 0.4   ALK PHOS 133*         Lab 12/23/24  0748 12/23/24  0542   PROBNP  --  2,548.0*   HSTROP T 9 9                 Lab 12/23/24  0552   PH, ARTERIAL 7.392   PCO2, ARTERIAL 45.2*   PO2 ART 80.9   O2 SATURATION ART 95.7   FIO2 28   HCO3 ART 27.5*   BASE EXCESS ART 1.9     Brief Urine Lab Results  (Last result in the past 365 days)        Color   Clarity   Blood   Leuk Est   Nitrite   Protein   CREAT   Urine HCG        04/22/24 0902 Dark Yellow   Cloudy   Negative   Trace   Negative   30 mg/dL (1+)                 Microbiology Results (last 10 days)       Procedure Component Value - Date/Time    COVID-19, FLU A/B, RSV PCR 1 HR TAT - Swab, Nasopharynx [[id number]]  (Abnormal) Collected: 12/23/24 0609    Lab Status: Final result Specimen: Swab from Nasopharynx Updated: 12/23/24 0658     COVID19 Not Detected     Influenza A PCR Detected     Influenza B PCR Not Detected     RSV, PCR Not Detected    Narrative:      Fact sheet for providers: https://www.fda.gov/media/884454/download    Fact sheet for patients: https://www.fda.gov/media/553889/download    Test performed by PCR.            [x]  Microbiology  [x]  Radiology  XR Chest 1 View    Result Date: 12/23/2024  No acute cardiopulmonary findings.  Electronically Signed By-Dr. Terell Solares MD On:12/23/2024 5:10 AM       [x]  EKG/Telemetry   []  Cardiology/Vascular   []  Pathology  []  Old records  [x]  Other:  Scheduled Meds:arformoterol, 15  mcg, Nebulization, BID - RT  aspirin, 81 mg, Oral, Nightly  atorvastatin, 40 mg, Oral, Nightly  budesonide, 0.5 mg, Nebulization, BID - RT  enoxaparin, 40 mg, Subcutaneous, Nightly  [START ON 12/24/2024] ferrous sulfate, 325 mg, Oral, Daily With Breakfast  ipratropium-albuterol, 3 mL, Nebulization, Q4H While Awake - RT  levothyroxine, 125 mcg, Oral, Daily  methylPREDNISolone sodium succinate, 40 mg, Intravenous, Q6H  nicotine, 1 patch, Transdermal, Q24H  nystatin, , Topical, Q12H  oseltamivir, 30 mg, Oral, Q12H  [START ON 12/24/2024] predniSONE, 40 mg, Oral, Daily With Breakfast  sodium chloride, 10 mL, Intravenous, Q12H      Continuous Infusions:   PRN Meds:.  acetaminophen **OR** acetaminophen **OR** acetaminophen    albuterol    senna-docusate sodium **AND** polyethylene glycol **AND** bisacodyl **AND** bisacodyl    influenza vaccine    ondansetron ODT **OR** ondansetron    sodium chloride    sodium chloride    sodium chloride    SUMAtriptan        Assessment & Plan   Assessment / Plan     Assessment/Plan:   Acute hypoxic respiratory failure secondary to COPD exacerbation due to influenza A infection  COPD with acute exacerbation  Influenza A infection  Diastolic dysfunction with elevated proBNP  Chronic hypercapnia  Tobacco abuse with cigarettes ongoing  Status post pacemaker placement  History of hypertension  Hyperlipidemia          Patient admitted for further evaluation and treatment  Continue supplemental oxygen titrate to keep sats between 90 and 96%  Start Brovana, Pulmicort with DuoNeb scheduled  Start.  Albuterol nebs  Start bronchopulmonary hygiene protocol  Continue Solu-Medrol today and transition to prednisone tomorrow  Continue azithromycin to complete 3-day course  Start Tamiflu to complete 5-day course  Plan to initiate diuresis once blood pressure allows  Patient instructed on importance of tobacco cessation  Will provide supplemental nicotine while inpatient  Hold antihypertensives restart as  needed  Continue home aspirin and atorvastatin  Further inpatient orders recommendations pending clinical course    Discussed case with patient's nurse at bedside.  Discussed case with Dr. Arroyo emergency medicine attending    Disposition: Home once respiratory status improves          VTE Prophylaxis:  Pharmacologic VTE prophylaxis orders are present.        CODE STATUS:    Code Status (Patient has no pulse and is not breathing): CPR (Attempt to Resuscitate)  Medical Interventions (Patient has pulse or is breathing): Full Support      Admission Status:  I believe this patient meets inpatient status.

## 2024-12-23 NOTE — PROGRESS NOTES
Respiratory Therapist Broncho-Pulmonary Hygiene Progress Note      Patient Name:  Italia Beard  YOB: 1960    Italia Beard meets the qualification for Level 2 of the Bronco-Pulmonary Hygiene Protocol. This was based on my daily patient assessment and includes review of chest x-ray results, cough ability and quality, oxygenation, secretions or risk for secretion development and patient mobility.     Broncho-Pulmonary Hygiene Assessment:    Level of Movement: Actively changing positions without assistance  Alert/ oriented/ cooperative    Breath Sounds: Clear to slightly diminished    Cough: Strong, effective    Chest X-Ray: Possible signs of consolidation and/or atelectasis or clear.     Sputum Productions: Able to produce small to moderate amount, of moderately thick secretions    History and Physical: New onset of bronchitis or existing chronic pulmonary conditions.  **(not in an exacerbation)    SpO2 to Oxygen Need: greater than 92% on room air or  less than 3L nasal canula    Current SpO2 is: 92% on 2.5    Based on this information, I have completed the following interventions: Aerobika with bronchodialtor medication or TID      Electronically signed by Janette Maguire, DYLON, 12/23/24, 1:53 PM EST.

## 2024-12-24 ENCOUNTER — READMISSION MANAGEMENT (OUTPATIENT)
Dept: CALL CENTER | Facility: HOSPITAL | Age: 64
End: 2024-12-24
Payer: MEDICARE

## 2024-12-24 VITALS
HEIGHT: 63 IN | SYSTOLIC BLOOD PRESSURE: 113 MMHG | BODY MASS INDEX: 29.18 KG/M2 | OXYGEN SATURATION: 93 % | TEMPERATURE: 97.3 F | HEART RATE: 76 BPM | DIASTOLIC BLOOD PRESSURE: 62 MMHG | WEIGHT: 164.68 LBS | RESPIRATION RATE: 20 BRPM

## 2024-12-24 LAB
ANION GAP SERPL CALCULATED.3IONS-SCNC: 15 MMOL/L (ref 5–15)
BASOPHILS # BLD AUTO: 0.02 10*3/MM3 (ref 0–0.2)
BASOPHILS NFR BLD AUTO: 0.3 % (ref 0–1.5)
BUN SERPL-MCNC: 16 MG/DL (ref 8–23)
BUN/CREAT SERPL: 12.8 (ref 7–25)
CALCIUM SPEC-SCNC: 9.5 MG/DL (ref 8.6–10.5)
CHLORIDE SERPL-SCNC: 96 MMOL/L (ref 98–107)
CO2 SERPL-SCNC: 26 MMOL/L (ref 22–29)
CREAT SERPL-MCNC: 1.25 MG/DL (ref 0.57–1)
DEPRECATED RDW RBC AUTO: 44.9 FL (ref 37–54)
EGFRCR SERPLBLD CKD-EPI 2021: 48.5 ML/MIN/1.73
EOSINOPHIL # BLD AUTO: 0 10*3/MM3 (ref 0–0.4)
EOSINOPHIL NFR BLD AUTO: 0 % (ref 0.3–6.2)
ERYTHROCYTE [DISTWIDTH] IN BLOOD BY AUTOMATED COUNT: 13.8 % (ref 12.3–15.4)
GLUCOSE SERPL-MCNC: 130 MG/DL (ref 65–99)
HCT VFR BLD AUTO: 48.2 % (ref 34–46.6)
HGB BLD-MCNC: 15.5 G/DL (ref 12–15.9)
IMM GRANULOCYTES # BLD AUTO: 0.03 10*3/MM3 (ref 0–0.05)
IMM GRANULOCYTES NFR BLD AUTO: 0.4 % (ref 0–0.5)
LYMPHOCYTES # BLD AUTO: 0.55 10*3/MM3 (ref 0.7–3.1)
LYMPHOCYTES NFR BLD AUTO: 7.3 % (ref 19.6–45.3)
MCH RBC QN AUTO: 28.2 PG (ref 26.6–33)
MCHC RBC AUTO-ENTMCNC: 32.2 G/DL (ref 31.5–35.7)
MCV RBC AUTO: 87.6 FL (ref 79–97)
MONOCYTES # BLD AUTO: 0.59 10*3/MM3 (ref 0.1–0.9)
MONOCYTES NFR BLD AUTO: 7.9 % (ref 5–12)
NEUTROPHILS NFR BLD AUTO: 6.31 10*3/MM3 (ref 1.7–7)
NEUTROPHILS NFR BLD AUTO: 84.1 % (ref 42.7–76)
NRBC BLD AUTO-RTO: 0 /100 WBC (ref 0–0.2)
PLATELET # BLD AUTO: 181 10*3/MM3 (ref 140–450)
PMV BLD AUTO: 11.2 FL (ref 6–12)
POTASSIUM SERPL-SCNC: 4 MMOL/L (ref 3.5–5.2)
RBC # BLD AUTO: 5.5 10*6/MM3 (ref 3.77–5.28)
SODIUM SERPL-SCNC: 137 MMOL/L (ref 136–145)
WBC NRBC COR # BLD AUTO: 7.5 10*3/MM3 (ref 3.4–10.8)

## 2024-12-24 PROCEDURE — 94799 UNLISTED PULMONARY SVC/PX: CPT

## 2024-12-24 PROCEDURE — 85025 COMPLETE CBC W/AUTO DIFF WBC: CPT | Performed by: FAMILY MEDICINE

## 2024-12-24 PROCEDURE — 94664 DEMO&/EVAL PT USE INHALER: CPT

## 2024-12-24 PROCEDURE — 99239 HOSP IP/OBS DSCHRG MGMT >30: CPT | Performed by: FAMILY MEDICINE

## 2024-12-24 PROCEDURE — 94618 PULMONARY STRESS TESTING: CPT

## 2024-12-24 PROCEDURE — 63710000001 PREDNISONE PER 1 MG: Performed by: FAMILY MEDICINE

## 2024-12-24 PROCEDURE — 80048 BASIC METABOLIC PNL TOTAL CA: CPT | Performed by: FAMILY MEDICINE

## 2024-12-24 PROCEDURE — 36415 COLL VENOUS BLD VENIPUNCTURE: CPT | Performed by: FAMILY MEDICINE

## 2024-12-24 RX ORDER — NYSTATIN 100000 [USP'U]/G
POWDER TOPICAL EVERY 12 HOURS SCHEDULED
Qty: 15 G | Refills: 0 | Status: SHIPPED | OUTPATIENT
Start: 2024-12-24

## 2024-12-24 RX ORDER — AZITHROMYCIN 500 MG/1
500 TABLET, FILM COATED ORAL
Qty: 1 TABLET | Refills: 0 | Status: SHIPPED | OUTPATIENT
Start: 2024-12-25 | End: 2024-12-26

## 2024-12-24 RX ORDER — OSELTAMIVIR PHOSPHATE 30 MG/1
30 CAPSULE ORAL EVERY 12 HOURS SCHEDULED
Qty: 7 CAPSULE | Refills: 0 | Status: SHIPPED | OUTPATIENT
Start: 2024-12-24 | End: 2024-12-28

## 2024-12-24 RX ORDER — NICOTINE 21 MG/24HR
1 PATCH, TRANSDERMAL 24 HOURS TRANSDERMAL
Qty: 28 PATCH | Refills: 0 | Status: SHIPPED | OUTPATIENT
Start: 2024-12-25 | End: 2025-01-22

## 2024-12-24 RX ORDER — POTASSIUM CHLORIDE 750 MG/1
20 CAPSULE, EXTENDED RELEASE ORAL
Status: COMPLETED | OUTPATIENT
Start: 2024-12-24 | End: 2024-12-24

## 2024-12-24 RX ORDER — FUROSEMIDE 20 MG/1
20 TABLET ORAL ONCE
Status: COMPLETED | OUTPATIENT
Start: 2024-12-24 | End: 2024-12-24

## 2024-12-24 RX ORDER — PREDNISONE 20 MG/1
40 TABLET ORAL
Qty: 8 TABLET | Refills: 0 | Status: SHIPPED | OUTPATIENT
Start: 2024-12-25 | End: 2024-12-29

## 2024-12-24 RX ADMIN — NYSTATIN: 100000 POWDER TOPICAL at 09:30

## 2024-12-24 RX ADMIN — LEVOTHYROXINE SODIUM 125 MCG: 0.12 TABLET ORAL at 09:12

## 2024-12-24 RX ADMIN — IPRATROPIUM BROMIDE AND ALBUTEROL SULFATE 3 ML: .5; 3 SOLUTION RESPIRATORY (INHALATION) at 07:52

## 2024-12-24 RX ADMIN — OSELTAMIVIR PHOSPHATE 30 MG: 30 CAPSULE ORAL at 09:29

## 2024-12-24 RX ADMIN — Medication 10 ML: at 09:14

## 2024-12-24 RX ADMIN — POTASSIUM CHLORIDE 20 MEQ: 750 CAPSULE, EXTENDED RELEASE ORAL at 12:03

## 2024-12-24 RX ADMIN — PREDNISONE 40 MG: 20 TABLET ORAL at 09:12

## 2024-12-24 RX ADMIN — IPRATROPIUM BROMIDE AND ALBUTEROL SULFATE 3 ML: .5; 3 SOLUTION RESPIRATORY (INHALATION) at 11:32

## 2024-12-24 RX ADMIN — ARFORMOTEROL TARTRATE 15 MCG: 15 SOLUTION RESPIRATORY (INHALATION) at 07:52

## 2024-12-24 RX ADMIN — AZITHROMYCIN DIHYDRATE 500 MG: 250 TABLET ORAL at 09:12

## 2024-12-24 RX ADMIN — BUDESONIDE 0.5 MG: 0.5 INHALANT RESPIRATORY (INHALATION) at 07:52

## 2024-12-24 RX ADMIN — NICOTINE 1 PATCH: 21 PATCH, EXTENDED RELEASE TRANSDERMAL at 09:13

## 2024-12-24 RX ADMIN — FERROUS SULFATE TAB 325 MG (65 MG ELEMENTAL FE) 325 MG: 325 (65 FE) TAB at 09:17

## 2024-12-24 RX ADMIN — FUROSEMIDE 20 MG: 20 TABLET ORAL at 12:03

## 2024-12-24 NOTE — DISCHARGE SUMMARY
[facility]         HOSPITALIST  DISCHARGE SUMMARY    Patient Name: Italia Beard  : 1960  MRN: [id number]    Date of Admission: 2024  Date of Discharge: 2024  Primary Care Physician: Cherelle Jones APRN    Consults       Date and Time Order Name Status Description    2024  6:30 AM Inpatient Hospitalist Consult              Active and Resolved Hospital Problems:  Acute hypoxic respiratory failure secondary to COPD exacerbation due to influenza A infection  COPD with acute exacerbation  Influenza A infection  Diastolic dysfunction with elevated proBNP  Chronic hypercapnia  Tobacco abuse with cigarettes ongoing  Status post pacemaker placement  History of hypertension  Hyperlipidemia  Active Hospital Problems    Diagnosis POA   • **Shortness of breath [R06.02] Yes      Resolved Hospital Problems   No resolved problems to display.       Hospital Course     Hospital Course:  Italia Beard is a 64 y.o. female  past medical history significant for COPD not on home O2, hypertension, hyperlipidemia, hypothyroidism presents with 1 week history of gradually worsening shortness of breath with associated cough becoming more productive of yellow sputum.  Patient denies any alleviating factors.  Patient presented the emergency department for further evaluation and treatment.  Patient found to be febrile to 100.4.  Sinus rhythm mixed with paced beats 100 on telemetry review.  Blood pressure within normal limits.  Patient satting 87% on room air.  Required 2 L nasal cannula to keep sats greater than 90%.  ABG demonstrated mild chronic hypercapnia with pCO2 45.  BNP elevated 2500.  White blood cell count within normal limits.  Pro-Scott within normal limits.  Flu a positive by PCR.  Chest x-ray negative for acute cardiopulmonary findings.  Exam revealed diminished breath sounds and wheezing bilaterally.  Patient given DuoNebs Solu-Medrol and empiric antibiotics.  Patient continued to require 2 and half to 3 L nasal cannula keep sats greater than 90%.  Hospitalist service contacted for admission for further evaluation and treatment of acute hypoxic respiratory failure due to COPD exacerbation triggered by influenza A infection.  Continued on supplemental oxygen, systemic steroids and inhaled bronchodilators.  Started on Tamiflu.  Continued on azithromycin.  Respiratory function improved.  Wheezing improved.  Weaned to prednisone.  Still requiring 1 L nasal cannula keep sats greater than 90% with activity.  Patient very eager to return home due to the Christmas holiday.  Home O2 arranged.  Patient seen and evaluated on day discharge and thought stable for discharge home to follow-up with her primary care provider and COPD clinic as an outpatient with the following instructions:    Acute hypoxic respiratory failure due to COPD exacerbation triggered by influenza A infection  -Continue home Breo Ellipta inhaler daily  -Continue home albuterol inhaler every 4 hours as needed for shortness of breath  -Finish course of azithromycin tomorrow  -Finish 4 more days of prednisone daily  -Continue flutter device and incentive spirometry to encourage bronchopulmonary hygiene      History of hypertension  -Blood pressure within normal limits off of home metoprolol and amlodipine while inpatient  -Continue to hold these medications as an outpatient  -Keep a daily blood pressure log to present to your primary care provider on follow-up to discuss need to restart blood pressure medications    Please take all medications per discharge medication list.  Please seek immediate medical attention for worsening shortness of breath, fever, chills, chest pain, palpitations, abdominal pain nausea vomiting diarrhea.    DISCHARGE Follow Up Recommendations for labs and diagnostics: As above      Day of Discharge     Vital Signs:  Temp:  [97.3 °F (36.3 °C)-98 °F (36.7 °C)] 97.3 °F (36.3 °C)  Heart Rate:   [73-95] 76  Resp:  [16-20] 20  BP: (106-124)/(59-68) 113/62  Flow (L/min) (Oxygen Therapy):  [2.5-3] 3  Physical Exam:   Gen. well-developed appearing stated age in no acute distress  HEENT: Normocephalic atraumatic moist membranes pupils equal round reactive light, no scleral icterus no conjunctival injection  Cardiovascular: regular rate and rhythm no murmurs rubs or gallops S1-S2, no lower extremity edema appreciated  Pulmonary: Wheezes resolved bilaterally, scant basilar crackles on the left, no rhonchi symmetric chest expansion, unlabored, no conversational dyspnea appreciated  Gastrointestinal: Soft nontender nondistended positive bowel sounds all 4 quadrants no rebound or guarding  Musculoskeletal: No clubbing cyanosis, warm and well-perfused, calves soft symmetric nontender bilaterally  Skin: Clean dry without rashes  Neuro: Cranial nerves II through XII intact grossly no sensorimotor deficits appreciated bilateral upper and lower extremities  Psych: Patient is calm cooperative and appropriate with exam not responding to internal stimuli  : No Barnhart catheter no bladder distention no suprapubic tenderness       Discharge Details        Discharge Medications        New Medications        Instructions Start Date   azithromycin 500 MG tablet  Commonly known as: ZITHROMAX   500 mg, Oral, Every 24 Hours Scheduled   Start Date: December 25, 2024     nicotine 21 MG/24HR patch  Commonly known as: NICODERM CQ   1 patch, Transdermal, Every 24 Hours Scheduled   Start Date: December 25, 2024     nystatin 264681 UNIT/GM powder  Commonly known as: MYCOSTATIN   Topical, Every 12 Hours Scheduled      oseltamivir 30 MG capsule  Commonly known as: TAMIFLU   30 mg, Oral, Every 12 Hours Scheduled      predniSONE 20 MG tablet  Commonly known as: DELTASONE   40 mg, Oral, Daily With Breakfast   Start Date: December 25, 2024            Changes to Medications        Instructions Start Date   alendronate 70 MG tablet  Commonly known  as: Fosamax  What changed: additional instructions   70 mg, Oral, Every 7 Days             Continue These Medications        Instructions Start Date   albuterol sulfate  (90 Base) MCG/ACT inhaler  Commonly known as: PROVENTIL HFA;VENTOLIN HFA;PROAIR HFA   2 puffs, Inhalation, Every 4 Hours PRN      aspirin 81 MG chewable tablet   81 mg, Oral, Nightly      atorvastatin 40 MG tablet  Commonly known as: LIPITOR   40 mg, Oral, Nightly      ferrous sulfate 325 (65 FE) MG tablet   325 mg, Oral, Daily With Breakfast      Fluticasone Furoate-Vilanterol 100-25 MCG/ACT aerosol powder   Commonly known as: Breo Ellipta   1 puff, Inhalation, Daily - RT      levothyroxine 125 MCG tablet  Commonly known as: SYNTHROID, LEVOTHROID   125 mcg, Oral, Daily      SUMAtriptan 100 MG tablet  Commonly known as: IMITREX   100 mg, Oral, As Needed             Stop These Medications      amLODIPine 5 MG tablet  Commonly known as: Norvasc     metoprolol succinate XL 25 MG 24 hr tablet  Commonly known as: TOPROL-XL              Allergies   Allergen Reactions   • Bee Venom Anaphylaxis   • Penicillins Hives       Discharge Disposition:  Home or Self Care    Diet:  Hospital:  Diet Order   Procedures   • Diet: Cardiac; Healthy Heart (2-3 Na+); Fluid Consistency: Thin (IDDSI 0)       Discharge Activity:   Activity Instructions       Gradually Increase Activity Until at Pre-Hospitalization Level              CODE STATUS:  Code Status and Medical Interventions: CPR (Attempt to Resuscitate); Full Support   Ordered at: 12/23/24 0745     Code Status (Patient has no pulse and is not breathing):    CPR (Attempt to Resuscitate)     Medical Interventions (Patient has pulse or is breathing):    Full Support         Future Appointments   Date Time Provider Department Center   2/20/2025  9:45 AM Cherelle Jones APRN [facility]   4/7/2025 10:00 AM [facility] [place] DEVICE CHECK [facility] [place] [facility]   4/7/2025 10:15 AM HÉCTOR Artis MD [facility]       Additional Instructions for the Follow-ups that You Need to Schedule       Discharge Follow-up with PCP   As directed       Currently Documented PCP:    Cherelle Jones APRN    PCP Phone Number:    [phone]     Follow Up Details: Hospital discharge follow up 1-2 weeks                Pertinent  and/or Most Recent Results     PROCEDURES:   None    LAB RESULTS:      Lab 12/24/24  0525 12/23/24  0700 12/23/24  0552 12/23/24  0542   WBC 7.50  --   --  5.29   HEMOGLOBIN 15.5  --   --  15.5   HEMATOCRIT 48.2*  --   --  47.7*   PLATELETS 181  --   --  152   NEUTROS ABS 6.31  --   --  4.54   IMMATURE GRANS (ABS) 0.03  --   --  0.02   LYMPHS ABS 0.55*  --   --  0.27*   MONOS ABS 0.59  --   --  0.44   EOS ABS 0.00  --   --  0.00   MCV 87.6  --   --  87.8   PROCALCITONIN  --   --   --  0.12   LACTATE  --  0.8 0.7  --          Lab 12/24/24  0525 12/23/24  0552 12/23/24  0542   SODIUM 137  --  135*   POTASSIUM 4.0  --  4.1   CHLORIDE 96*  --  97*   CO2 26.0  --  28.4   ANION GAP 15.0  --  9.6   BUN 16  --  7*   CREATININE 1.25* 0.99 1.05*   EGFR 48.5*  --  59.8*   GLUCOSE 130*  --  115*   CALCIUM 9.5  --  9.3         Lab 12/23/24  0542   TOTAL PROTEIN 7.3   ALBUMIN 4.0   GLOBULIN 3.3   ALT (SGPT) 13   AST (SGOT) 20   BILIRUBIN 0.4   ALK PHOS 133*         Lab 12/23/24  0748 12/23/24  0542   PROBNP  --  2,548.0*   HSTROP T 9 9                 Lab 12/23/24  0552   PH, ARTERIAL 7.392   PCO2, ARTERIAL 45.2*   PO2 ART 80.9   O2 SATURATION ART 95.7   FIO2 28   HCO3 ART 27.5*   BASE EXCESS ART 1.9     Brief Urine Lab Results  (Last result in the past 365 days)        Color   Clarity   Blood   Leuk Est   Nitrite   Protein   CREAT   Urine HCG        04/22/24 0902 Dark Yellow   Cloudy   Negative   Trace   Negative   30 mg/dL (1+)                 Microbiology Results (last 10 days)       Procedure Component Value - Date/Time    Blood Culture - Blood, Arm, Right [[id number]]  (Normal) Collected: 12/23/24 0700    Lab  Status: Preliminary result Specimen: Blood from Arm, Right Updated: 12/24/24 0715     Blood Culture No growth at 24 hours    Blood Culture - Blood, Arm, Left [[id number]]  (Normal) Collected: 12/23/24 0700    Lab Status: Preliminary result Specimen: Blood from Arm, Left Updated: 12/24/24 0715     Blood Culture No growth at 24 hours    COVID-19, FLU A/B, RSV PCR 1 HR TAT - Swab, Nasopharynx [[id number]]  (Abnormal) Collected: 12/23/24 0609    Lab Status: Final result Specimen: Swab from Nasopharynx Updated: 12/23/24 0658     COVID19 Not Detected     Influenza A PCR Detected     Influenza B PCR Not Detected     RSV, PCR Not Detected    Narrative:      Fact sheet for providers: https://www.fda.gov/media/911481/download    Fact sheet for patients: https://www.fda.gov/media/169705/download    Test performed by PCR.            XR Chest 1 View    Result Date: 12/23/2024  Impression: No acute cardiopulmonary findings.  Electronically Signed By-Dr. Terell Solares MD On:12/23/2024 5:10 AM                   Labs Pending at Discharge:  Pending Labs       Order Current Status    Blood Culture - Blood, Arm, Left Preliminary result    Blood Culture - Blood, Arm, Right Preliminary result              Time spent on Discharge including face to face service: Greater than 35 minutes    Electronically signed by Rodolfo Caicedo MD, 12/24/24, 2:30 PM EST.

## 2024-12-24 NOTE — PLAN OF CARE
Goal Outcome Evaluation:           Progress: improving  Outcome Evaluation: No acute events overnight. VSS. Had a shower this AM. No needs or issues noted at this time.

## 2024-12-24 NOTE — PROCEDURES
Walking Oximetry Progress Note      Patient Name:  Italia Beard  YOB: 1960  Date of Procedure: 12/24/24              ROOM AIR BASELINE   [id number]   Heart Rate 99     EXERCISE ON ROOM AIR SpO2% EXERCISE ON O2 LPM SpO2%   1 MINUTE 91 1 MINUTE 1 90   2 MINUTES 90 2 MINUTES 1 91   3 MINUTES 89 3 MINUTES 1 91   4 MINUTES 87 4 MINUTES 1 92   5 MINUTES 85 5 MINUTES 1 90   6 MINUTES na 6 MINUTES 1 90              Time to Recovery  30 seconds   SpO2% Post Exercise  92 on 1lnc.    HR Post Exercise  99     Comments:           Electronically signed by Vandana Mercedes, RRT, 12/24/24, 2:05 PM EST.

## 2024-12-24 NOTE — OUTREACH NOTE
Prep Survey      Flowsheet Row Responses   [facility] patient discharged from? [facility]   Is LACE score < 7 ? Yes   Eligibility [facility]   Date of Admission 12/23/24   Date of Discharge 12/24/24   Discharge Disposition Home or Self Care   Discharge diagnosis Shortness of breath, Acute hypoxic respiratory failure secondary to COPD exacerbation due to influenza A infection   Does the patient have one of the following disease processes/diagnoses(primary or secondary)? COPD   Does the patient have Home health ordered? No   Is there a DME ordered? Yes   What DME was ordered? O2 1L NC ordered from [employer]   Prep survey completed? Yes            Kristin CRUZ - Registered Nurse

## 2024-12-24 NOTE — PLAN OF CARE
Goal Outcome Evaluation:  Plan of Care Reviewed With: patient        Progress: improving  Outcome Evaluation: Patient A/Ox4. On 1L nasal cannula. No complaints of pain. Up ad vikram. No other issues/needs at this time. Discharging home.     Pulmonary Clinic Note    Date of Visit: 8/2/2023     Chief Complaint:  No chief complaint on file.    HPI:   Artem Joseph is a very pleasant 73 y.o. year old male current***smoker (40 pack-years, smokes ***), with a PMHx of COPD, metastatic melanoma s/p immunotherapy, GERD, A-fib, HTN who presented to the Pulmonary Clinic for a regular follow up. Last seen in the office on 12/12/2023 with Dr. Baugh.     Patient is followed by pulmonary office for COPD.  PFTs in 2022 shows an FVC of 5.10 L or 121%, FEV1 2.46 L or 77%, FEV1/FVC 48%, RV 97%, TLC 1 9%, DLCO 65% predicted.  CT from October 2022 showed stable emphysema with peripheral interlobular septal thickening in the lingula and right middle lobe.  Patient is currently on Trelegy 100 and albuterol.  ***      Exacerbations this year:    Current medication regimen:    Oxygen use:  2-3 LPM    MMRC Grade:   0- Breathless only during strenuous exercise  1- Short of breath when hurrying or going up a small hill  2- Walks slower than friends due to breathlessness, has to stop at own pace  3- Stops to catch breath on level ground after 100m  4- Breathless with ambulating around house or ADLs        Past Medical History:   Diagnosis Date    Adrenal disorder (HCC)     Arrhythmia     Breath shortness     Cancer (HCC)     melanoma in 2007, original 1989 from back of neck (chemo)    Cataract     Emphysema of lung (HCC)     Heart burn     Hypertension     Indigestion     Metastatic melanoma (HCC) 1990 / 2012    original neck lesion resected 1990 / recurrent lesion resected 2007 / third lesion resected 2012 /recurrence in 2016    Thyroid disease      Past Surgical History:   Procedure Laterality Date    CATARACT PHACO WITH IOL Left 11/6/2018    Procedure: CATARACT PHACO WITH IOL;  Surgeon: Shravan España M.D.;  Location: SURGERY SAME DAY [facility];  Service: Ophthalmology    CATARACT PHACO WITH IOL Right 10/16/2018    Procedure: CATARACT PHACO WITH IOL;  Surgeon: Shravan  MISA España M.D.;  Location: SURGERY SAME DAY [facility];  Service: Ophthalmology    COLONOSCOPY - ENDO  10/9/2016    Procedure: COLONOSCOPY - ENDO;  Surgeon: William Mitchell M.D.;  Location: SURGERY [facility];  Service:     RECOVERY  7/26/2016    Procedure: CT-CT Guided abdominal mass-;  Surgeon: Toby Surgery;  [place] [facility] [place]  Service:     MASS EXCISION GENERAL  3/9/2012    Performed by MARLEN KEEN at SURGERY SAME DAY [facility]    OTHER      lymph nodes back of neck     Social History     Socioeconomic History    Marital status:      Spouse name: Not on file    Number of children: Not on file    Years of education: Not on file    Highest education level: Not on file   Occupational History    Not on file   Tobacco Use    Smoking status: Every Day     Packs/day: 1.00     Years: 40.00     Pack years: 40.00     Types: Cigarettes     Passive exposure: Past    Smokeless tobacco: Never    Tobacco comments:     did vape, but has not quit 10/2019, 1/2-1 PPD   Vaping Use    Vaping Use: Former    Substances: Nicotine    Passive vaping exposure: Yes   Substance and Sexual Activity    Alcohol use: Yes     Comment: 1 drink per month     Drug use: No    Sexual activity: Not on file   Other Topics Concern    Not on file   Social History Narrative    Not on file     Social Determinants of Health     Financial Resource Strain: Not on file   Food Insecurity: Not on file   Transportation Needs: Not on file   Physical Activity: Not on file   Stress: Not on file   Social Connections: Not on file   Intimate Partner Violence: Not on file   Housing Stability: Not on file        Family History   Problem Relation Age of Onset    Heart Disease Mother     Cancer Father     Heart Disease Maternal Grandmother     Heart Disease Maternal Grandfather     Cancer Paternal Grandfather      Current Outpatient Medications on File Prior to Visit   Medication Sig Dispense Refill    rivaroxaban  "(XARELTO) 20 MG Tab tablet Take 1 Tablet by mouth with dinner. 90 Tablet 3    DILTIAZem CD (CARDIZEM CD) 120 MG CAPSULE SR 24 HR Take 1 Capsule by mouth every day. 90 Capsule 3    flecainide (TAMBOCOR) 100 MG Tab Take 1 Tablet by mouth 2 times a day. 180 Tablet 3    felodipine ER (PLENDIL) 2.5 MG TABLET SR 24 HR Take 1 Tablet by mouth every day. 90 Tablet 3    simvastatin (ZOCOR) 20 MG Tab Take 1 Tablet by mouth every morning. 90 Tablet 3    traMADol (ULTRAM) 50 MG Tab 1 TABLET TWICE A DAY AS NEEDED QTY 60 FOR 30 DAY SUPPLY. DX: M47.816. FWD      levothyroxine (SYNTHROID) 150 MCG Tab Take 1 Tablet by mouth every day. 90 Tablet 4    hydrocortisone (CORTEF) 10 MG Tab Take 1 Tablet by mouth 4 times a day. 336 Tablet 4    SYRINGE-NEEDLE, DISP, 3 ML (BD LUER-LOCK SYRINGE) 18G X 1-1/2\" 3 ML Misc 1 Each every 14 days. Use to draw up testosterone 30 Each 11    TRELEGY ELLIPTA 100-62.5-25 MCG/ACT AEROSOL POWDER, BREATH ACTIVATED inhalation inhale 1 puff by mouth and INTO THE LUNGS once daily 3 Each 3    fluticasone-umeclidin-vilant (TRELEGY ELLIPTA) 100-62.5-25 MCG/ACT AEROSOL POWDER, BREATH ACTIVATED inhalation Inhale 1 Inhalation every day. 33 Each 3    nicotine (NICODERM) 14 MG/24HR PATCH 24 HR apply 1 patch TO CLEAN, DRY, AND INTACT SKIN REMOVE AND REPLACE once daily      acetaminophen (TYLENOL) 325 MG Tab acetaminophen 325 mg tablet   take 2 tablets by mouth three times a day if needed for pain for 5 days      omeprazole (PRILOSEC) 20 MG CPDR Take 20 mg by mouth every day.       No current facility-administered medications on file prior to visit.     Allergies: Sulfa drugs    ROS:   ROS    Vitals:  There were no vitals taken for this visit.    Physical Exam:  Physical Exam    Laboratory Data:  Multioximetry (Date: ***)-       PFTs: (Date: 12/12/2022)-      Impression:  Baseline spirometry shows airflow obstruction with FEV1/FVC ratio 48 and FEV1 of 2.46 L or 77% predicted.  No bronchodilator testing was performed.  Lung " volumes are within normal limits.  Diffusion capacity is reduced at 65% predicted.  Pulmonary function testing shows mild airflow obstruction with reduced DLCO consistent with stated diagnosis of COPD.      ECHO: (Date: ***)-  Impression:    Assessment and Plan:    Problem List Items Addressed This Visit    None      Diagnostic studies have been reviewed with the patient.    No follow-ups on file.     This note was generated using voice recognition software which has a chance of producing errors of grammar and possibly content.  I have made every reasonable attempt to find and correct any obvious errors, but it should be expected that some may not be found prior to finalization of this note.    Time spent in record review prior to patient arrival, reviewing results, and in face-to-face encounter totaled *** min.  __________  YOGI Don  Pulmonary Medicine  [facility]

## 2024-12-26 ENCOUNTER — TRANSITIONAL CARE MANAGEMENT TELEPHONE ENCOUNTER (OUTPATIENT)
Dept: CALL CENTER | Facility: HOSPITAL | Age: 64
End: 2024-12-26
Payer: MEDICARE

## 2024-12-26 NOTE — OUTREACH NOTE
Call Center TCM Note      Flowsheet Row Responses   [facility] patient discharged from? [facility]   Does the patient have one of the following disease processes/diagnoses(primary or secondary)? COPD   TCM attempt successful? Yes   Call start time 1347   Call end time 1349   Discharge diagnosis Shortness of breath, Acute hypoxic respiratory failure secondary to COPD exacerbation due to influenza A infection   Person spoke with today (if not patient) and relationship patient   Meds reviewed with patient/caregiver? Yes   Is the patient having any side effects they believe may be caused by any medication additions or changes? No   Does the patient have all medications ordered at discharge? No  [Still waiting on Tamiflu]   Nursing Interventions No intervention needed   Prescription comments Patient is waiting on Tamiflu to come in to pharmacy.   Is the patient taking all medications as directed (includes completed medication regime)? Yes   Comments Patient prefers to schedule followup appts as needed.   Does the patient have an appointment with their PCP within 7-14 days of discharge? No   Nursing Interventions Patient declined scheduling/rescheduling appointment at this time   Has all DME been delivered? Yes   Psychosocial issues? No   Did the patient receive a copy of their discharge instructions? Yes   Nursing interventions Reviewed instructions with patient   What is the patient's perception of their health status since discharge? Improving   Nursing Interventions Nurse provided patient education   Is the patient/caregiver able to teach back the hierarchy of who to call/visit for symptoms/problems? PCP, Specialist, Home health nurse, Urgent Care, ED, 911 Yes   Patient reports what zone on this call? Green Zone   Green Zone Reports doing well, Breathing without shortness of breath   Green Zone interventions: Take daily medications, Use oxygen as prescribed   TCM call completed? Yes   Call end time 1349   Would this  patient benefit from a Referral to [facility] Social Work? No   Is the patient interested in additional calls from an ambulatory ? No            Tristan Aviles RN    12/26/2024, 13:49 EST

## 2024-12-28 LAB
BACTERIA SPEC AEROBE CULT: NORMAL
BACTERIA SPEC AEROBE CULT: NORMAL

## 2025-01-16 ENCOUNTER — HOSPITAL ENCOUNTER (OUTPATIENT)
Facility: HOSPITAL | Age: 65
Discharge: HOME | End: 2025-01-16
Payer: MEDICARE

## 2025-01-16 VITALS
RESPIRATION RATE: 16 BRPM | HEIGHT: 63 IN | TEMPERATURE: 97.8 F | OXYGEN SATURATION: 96 % | WEIGHT: 173.8 LBS | SYSTOLIC BLOOD PRESSURE: 156 MMHG | HEART RATE: 102 BPM | DIASTOLIC BLOOD PRESSURE: 104 MMHG | BODY MASS INDEX: 30.79 KG/M2

## 2025-01-16 DIAGNOSIS — J44.9 CHRONIC OBSTRUCTIVE PULMONARY DISEASE, UNSPECIFIED COPD TYPE: Primary | ICD-10-CM

## 2025-01-16 DIAGNOSIS — F17.211 NICOTINE DEPENDENCE, CIGARETTES, IN REMISSION: ICD-10-CM

## 2025-01-16 DIAGNOSIS — R06.09 DYSPNEA ON EXERTION: ICD-10-CM

## 2025-01-16 DIAGNOSIS — J44.1 CHRONIC OBSTRUCTIVE PULMONARY DISEASE WITH ACUTE EXACERBATION: Chronic | ICD-10-CM

## 2025-01-16 DIAGNOSIS — R09.89 CHEST CONGESTION: ICD-10-CM

## 2025-01-16 DIAGNOSIS — J43.9 PULMONARY EMPHYSEMA, UNSPECIFIED EMPHYSEMA TYPE: Chronic | ICD-10-CM

## 2025-01-16 DIAGNOSIS — J96.01 ACUTE RESPIRATORY FAILURE WITH HYPOXIA: ICD-10-CM

## 2025-01-16 RX ORDER — AMLODIPINE BESYLATE 5 MG/1
1 TABLET ORAL DAILY
COMMUNITY
Start: 2024-12-31

## 2025-01-16 RX ORDER — GUAIFENESIN 600 MG/1
1200 TABLET, EXTENDED RELEASE ORAL 2 TIMES DAILY
Qty: 120 TABLET | Refills: 0 | Status: SHIPPED | OUTPATIENT
Start: 2025-01-16

## 2025-01-16 NOTE — ADDENDUM NOTE
Encounter addended by: Alejandra Colvin APRN on: 1/16/2025 12:09 PM   Actions taken: Clinical Note Signed

## 2025-01-16 NOTE — PROGRESS NOTES
Primary Care Provider  Cherelle Jones APRN     Referring Provider  Rodolfo Caicedo MD     Chief Complaint  Cough (Unable to cough anything up ), Hospital Follow Up Visit ([facility] 12/23-12/24), Wheezing, and COPD    Subjective          Italia Beard presents to [facility]  History of Present Illness  Italia Beard is a 64 y.o. female patient here to establish care in COPD clinic.    Patient was recently admitted to [facility] from 12/23/2024 until 12/24/2024.  Per her discharge summary, she has COPD, hypertension, hyperlipidemia and hypothyroidism.  She had presented with 1 week of gradual worsening shortness of breath with a productive cough.  Patient was found to be febrile with a temperature of 100.4.  She was in sinus rhythm with mixed paced beats.  Patient was satting 87% on room air and did require 2 L of oxygen to maintain saturations above 90.  ABG did show mild chronic hypercapnia with pCO2 of 45.  Flu A PCR was positive.  Chest x-ray was negative for any acute cardiopulmonary findings.  Exam did reveal diminished breath sounds with wheezing.  Patient was given nebulizer treatments, steroids and antibiotics.  Patient was admitted for further evaluation and treatment of acute hypoxic respiratory failure due to COPD exacerbation triggered by influenza A infection.  Patient to continue oxygen, steroids and nebulizer treatments.  She was started on Tamiflu and continued on azithromycin.  Patient's wheezing and respiratory status did improve.  Patient was weaned to prednisone.  Patient did require 1 L of oxygen with activity.  Patient was eager to return home for [place] and home oxygen was arranged.  Patient encouraged to continue flutter valve and incentive spirometer postdischarge.    Patient states she is doing okay since discharge.  She does have some occasional wheezing and cough.  She does complain of some chest congestion.  She has been on  Breo for quite some time and this has been managed by her primary care provider.  She has not had any exacerbations until having the flu.  She has been using her albuterol more often since discharge.  She is currently smoking 1 cigarette daily and is trying to quit.  She has a 63 pack year history.  She has been diagnosed with COPD for approximately 10 years and has been controlled.  She does have oxygen from the hospital and has used it twice since discharge.  She is agreeable to having a pulmonary function test and a 6 minute walk.       Her history of smoking is   Tobacco Use: High Risk (1/16/2025)    Patient History     Smoking Tobacco Use: Every Day     Smokeless Tobacco Use: Never     Passive Exposure: Current   Italia Beard  reports that she has been smoking cigarettes. She started smoking about 42 years ago. She has a 63.1 pack-year smoking history. She has been exposed to tobacco smoke. She has never used smokeless tobacco. I have educated her on the risk of diseases from using tobacco products such as cancer, COPD, and heart disease.     I advised her to quit and she is willing to quit. We have discussed the following method/s for tobacco cessation:  Education Material, Counseling, and [place].  Encourage a quit date for 1 month from today.  She will follow up with me in 3 months or sooner to check on her progress.    I spent 5 minutes counseling the patient.        Review of Systems   Constitutional:  Negative for chills, fatigue, fever, unexpected weight gain and unexpected weight loss.   HENT:  Congestion: Nasal.    Respiratory:  Positive for cough, shortness of breath and wheezing. Negative for apnea.         Negative for Hemoptysis     Cardiovascular:  Negative for chest pain, palpitations and leg swelling.   Skin:         Negative for cyanosis      Sleep: Negative for Excessive daytime sleepiness  Negative for morning headaches  Negative for Snoring    Family History   Problem Relation  Age of Onset    No Known Problems Mother     Heart attack Father     Malig Hyperthermia Neg Hx         Social History     Socioeconomic History    Marital status:    Tobacco Use    Smoking status: Every Day     Current packs/day: 1.50     Average packs/day: 1.5 packs/day for 42.0 years (63.1 ttl pk-yrs)     Types: Cigarettes     Start date: 1983     Passive exposure: Current    Smokeless tobacco: Never    Tobacco comments:     Smokes 1 cigarette daily as of 1/16/25 AL   Vaping Use    Vaping status: Never Used   Substance and Sexual Activity    Alcohol use: Never    Drug use: Never    Sexual activity: Yes     Partners: Male     Birth control/protection: Post-menopausal        Past Medical History:   Diagnosis Date    Anemia     B12 deficiency     COPD (chronic obstructive pulmonary disease)     Heart murmur     Hyperlipidemia     Hypertension     Hypothyroidism     Migraines     Positive colorectal cancer screening using Cologuard test         Immunization History   Administered Date(s) Administered    Flu Vaccine Intradermal Quad 18-64YR 09/24/2020    Hepatitis A 05/18/2018    Influenza Seasonal Injectable 09/24/2020         Allergies   Allergen Reactions    Bee Venom Anaphylaxis    Penicillins Hives          Current Outpatient Medications:     albuterol sulfate  (90 Base) MCG/ACT inhaler, Inhale 2 puffs Every 4 (Four) Hours As Needed for Wheezing., Disp: 18 g, Rfl: 2    alendronate (Fosamax) 70 MG tablet, Take 1 tablet by mouth Every 7 (Seven) Days. (Patient taking differently: Take 1 tablet by mouth Every 7 (Seven) Days. Saturday), Disp: 13 tablet, Rfl: 1    aspirin 81 MG chewable tablet, Chew 1 tablet Every Night., Disp: , Rfl:     atorvastatin (LIPITOR) 40 MG tablet, Take 1 tablet by mouth Every Night., Disp: 90 tablet, Rfl: 1    ferrous sulfate 325 (65 FE) MG tablet, Take 1 tablet by mouth Daily With Breakfast., Disp: , Rfl:     Fluticasone Furoate-Vilanterol (Breo Ellipta) 100-25 MCG/ACT aerosol  powder , Inhale 1 puff Daily., Disp: 28 each, Rfl: 5    levothyroxine (SYNTHROID, LEVOTHROID) 125 MCG tablet, Take 1 tablet by mouth Daily., Disp: 90 tablet, Rfl: 1    nicotine (NICODERM CQ) 21 MG/24HR patch, Place 1 patch on the skin as directed by provider Daily for 28 days., Disp: 28 patch, Rfl: 0    nystatin (MYCOSTATIN) 641874 UNIT/GM powder, Apply  topically to the appropriate area as directed Every 12 (Twelve) Hours., Disp: 15 g, Rfl: 0    SUMAtriptan (IMITREX) 100 MG tablet, Take 1 tablet by mouth As Needed for Migraine., Disp: 9 tablet, Rfl: 5    amLODIPine (NORVASC) 5 MG tablet, Take 1 tablet by mouth Daily. (Patient not taking: Reported on 1/16/2025), Disp: , Rfl:     guaiFENesin (Mucinex) 600 MG 12 hr tablet, Take 2 tablets by mouth 2 (Two) Times a Day., Disp: 120 tablet, Rfl: 0     Objective   Physical Exam  Constitutional:       General: She is not in acute distress.     Appearance: Normal appearance. She is normal weight.   HENT:      Right Ear: Hearing normal.      Left Ear: Hearing normal.      Nose: No nasal tenderness or congestion.      Mouth/Throat:      Mouth: Mucous membranes are moist. No oral lesions.   Eyes:      Extraocular Movements: Extraocular movements intact.      Pupils: Pupils are equal, round, and reactive to light.   Cardiovascular:      Rate and Rhythm: Normal rate and regular rhythm.      Pulses: Normal pulses.      Heart sounds: Normal heart sounds. No murmur heard.  Pulmonary:      Effort: Pulmonary effort is normal.      Breath sounds: Normal breath sounds. No wheezing, rhonchi or rales.   Musculoskeletal:      Right lower leg: No edema.      Left lower leg: No edema.   Skin:     General: Skin is warm and dry.      Findings: No lesion or rash.   Neurological:      General: No focal deficit present.      Mental Status: She is alert and oriented to person, place, and time.   Psychiatric:         Mood and Affect: Affect normal. Mood is not anxious or depressed.         Vital  "Signs:   BP (!) 156/104 Comment: Patient was told to D/C B/P medication when DC'd  Pulse 102   Temp 97.8 °F (36.6 °C) (Oral)   Resp 16   Ht 160 cm (63\")   Wt 78.8 kg (173 lb 12.8 oz)   SpO2 96% Comment: RA; 1 L PRN  BMI 30.79 kg/m²        Result Review :   The following data was reviewed by: FABIAN Betts on 01/16/2025:  CMP          8/20/2024    10:26 12/23/2024    05:42 12/23/2024    05:52 12/24/2024    05:25   CMP   Glucose 88  115   130    BUN 11  7   16    Creatinine 1.17  1.05  0.99  1.25    EGFR 52.9  59.8   48.5    Sodium 142  135   137    Potassium 4.1  4.1   4.0    Chloride 104  97   96    Calcium 9.8  9.3   9.5    Total Protein 6.6  7.3      Albumin 4.1  4.0      Globulin 2.5  3.3      Total Bilirubin 0.3  0.4      Alkaline Phosphatase 126  133      AST (SGOT) 17  20      ALT (SGPT) 16  13      Albumin/Globulin Ratio 1.6  1.2      BUN/Creatinine Ratio 9.4  6.7   12.8    Anion Gap 10.0  9.6   15.0      CBC w/diff          8/20/2024    10:26 12/23/2024    05:42 12/24/2024    05:25   CBC w/Diff   WBC 5.17  5.29  7.50    RBC 5.20  5.43  5.50    Hemoglobin 15.2  15.5  15.5    Hematocrit 46.1  47.7  48.2    MCV 88.7  87.8  87.6    MCH 29.2  28.5  28.2    MCHC 33.0  32.5  32.2    RDW 12.6  14.1  13.8    Platelets 155  152  181    Neutrophil Rel % 65.3  85.8  84.1    Immature Granulocyte Rel % 0.2  0.4  0.4    Lymphocyte Rel % 25.5  5.1  7.3    Monocyte Rel % 7.4  8.3  7.9    Eosinophil Rel % 0.8  0.0  0.0    Basophil Rel % 0.8  0.4  0.3      Data reviewed : Radiologic studies chest CT 8/20/2024, Recent hospitalization notes hospital discharge summary 12/24/2024, and primary care last office note 11/25/2024    Procedures        Assessment and Plan    Diagnoses and all orders for this visit:    1. Chronic obstructive pulmonary disease, unspecified COPD type (Primary)  -     Alpha - 1 - Antitrypsin; Future  -     Complete PFT - Pre & Post Bronchodilator; Future  -     6 Minute Walk Test; Future    2. " Chronic obstructive pulmonary disease with acute exacerbation  Comments:  continue Breo    3. Acute respiratory failure with hypoxia  Comments:  continue oxygen    4. Nicotine dependence, cigarettes, in remission  Comments:  LDCT managed by PCP    5. Dyspnea on exertion  Comments:  continue albuterol    6. Pulmonary emphysema, unspecified emphysema type  Comments:  continue Breo  Orders:  -     Alpha - 1 - Antitrypsin; Future  -     Complete PFT - Pre & Post Bronchodilator; Future    7. Chest congestion  -     guaiFENesin (Mucinex) 600 MG 12 hr tablet; Take 2 tablets by mouth 2 (Two) Times a Day.  Dispense: 120 tablet; Refill: 0    Smoking cessation counseling provided.  I spent 5 minutes today counseling patient on the risks of smoking, including throat cancer, lung cancer, COPD, heart disease and death.  Also discussed the benefits of quitting.     I spent 67 minutes caring for Italia on this date of service. This time includes time spent by me in the following activities:preparing for the visit, reviewing tests, obtaining and/or reviewing a separately obtained history, performing a medically appropriate examination and/or evaluation , counseling and educating the patient/family/caregiver, ordering medications, tests, or procedures, and documenting information in the medical record    Follow Up   Return in about 2 months (around 3/16/2025) for Recheck with Alejandra.  Patient was given instructions and counseling regarding her condition or for health maintenance advice. Please see specific information pulled into the AVS if appropriate.

## 2025-01-20 LAB
QT INTERVAL: 298 MS
QTC INTERVAL: 395 MS

## 2025-03-03 ENCOUNTER — OFFICE VISIT (OUTPATIENT)
Dept: FAMILY MEDICINE CLINIC | Facility: CLINIC | Age: 65
End: 2025-03-03
Payer: MEDICARE

## 2025-03-03 VITALS
TEMPERATURE: 97.3 F | HEIGHT: 63 IN | RESPIRATION RATE: 16 BRPM | DIASTOLIC BLOOD PRESSURE: 98 MMHG | OXYGEN SATURATION: 95 % | HEART RATE: 87 BPM | BODY MASS INDEX: 30.95 KG/M2 | SYSTOLIC BLOOD PRESSURE: 160 MMHG | WEIGHT: 174.7 LBS

## 2025-03-03 DIAGNOSIS — J44.9 CHRONIC OBSTRUCTIVE PULMONARY DISEASE, UNSPECIFIED COPD TYPE: ICD-10-CM

## 2025-03-03 DIAGNOSIS — G43.819 OTHER MIGRAINE WITHOUT STATUS MIGRAINOSUS, INTRACTABLE: ICD-10-CM

## 2025-03-03 DIAGNOSIS — E03.9 ACQUIRED HYPOTHYROIDISM: ICD-10-CM

## 2025-03-03 DIAGNOSIS — R09.89 CHEST CONGESTION: ICD-10-CM

## 2025-03-03 DIAGNOSIS — Z00.00 INITIAL MEDICARE ANNUAL WELLNESS VISIT: Primary | ICD-10-CM

## 2025-03-03 DIAGNOSIS — E78.2 MIXED HYPERLIPIDEMIA: ICD-10-CM

## 2025-03-03 DIAGNOSIS — I10 PRIMARY HYPERTENSION: ICD-10-CM

## 2025-03-03 DIAGNOSIS — F17.210 CIGARETTE NICOTINE DEPENDENCE WITHOUT COMPLICATION: ICD-10-CM

## 2025-03-03 DIAGNOSIS — M81.0 OSTEOPOROSIS, UNSPECIFIED OSTEOPOROSIS TYPE, UNSPECIFIED PATHOLOGICAL FRACTURE PRESENCE: ICD-10-CM

## 2025-03-03 LAB
ALBUMIN SERPL-MCNC: 4.1 G/DL (ref 3.5–5.2)
ALBUMIN/GLOB SERPL: 1.3 G/DL
ALP SERPL-CCNC: 147 U/L (ref 39–117)
ALT SERPL W P-5'-P-CCNC: 13 U/L (ref 1–33)
ANION GAP SERPL CALCULATED.3IONS-SCNC: 12 MMOL/L (ref 5–15)
AST SERPL-CCNC: 17 U/L (ref 1–32)
BASOPHILS # BLD AUTO: 0.05 10*3/MM3 (ref 0–0.2)
BASOPHILS NFR BLD AUTO: 0.8 % (ref 0–1.5)
BILIRUB SERPL-MCNC: 0.4 MG/DL (ref 0–1.2)
BUN SERPL-MCNC: 11 MG/DL (ref 8–23)
BUN/CREAT SERPL: 12 (ref 7–25)
CALCIUM SPEC-SCNC: 9.9 MG/DL (ref 8.6–10.5)
CHLORIDE SERPL-SCNC: 102 MMOL/L (ref 98–107)
CHOLEST SERPL-MCNC: 143 MG/DL (ref 0–200)
CO2 SERPL-SCNC: 27 MMOL/L (ref 22–29)
CREAT SERPL-MCNC: 0.92 MG/DL (ref 0.57–1)
DEPRECATED RDW RBC AUTO: 42.8 FL (ref 37–54)
EGFRCR SERPLBLD CKD-EPI 2021: 70.1 ML/MIN/1.73
EOSINOPHIL # BLD AUTO: 0.12 10*3/MM3 (ref 0–0.4)
EOSINOPHIL NFR BLD AUTO: 1.9 % (ref 0.3–6.2)
ERYTHROCYTE [DISTWIDTH] IN BLOOD BY AUTOMATED COUNT: 13.4 % (ref 12.3–15.4)
GLOBULIN UR ELPH-MCNC: 3.2 GM/DL
GLUCOSE SERPL-MCNC: 81 MG/DL (ref 65–99)
HCT VFR BLD AUTO: 45.5 % (ref 34–46.6)
HDLC SERPL-MCNC: 76 MG/DL (ref 40–60)
HGB BLD-MCNC: 15.1 G/DL (ref 12–15.9)
IMM GRANULOCYTES # BLD AUTO: 0.02 10*3/MM3 (ref 0–0.05)
IMM GRANULOCYTES NFR BLD AUTO: 0.3 % (ref 0–0.5)
LDLC SERPL CALC-MCNC: 52 MG/DL (ref 0–100)
LDLC/HDLC SERPL: 0.68 {RATIO}
LYMPHOCYTES # BLD AUTO: 1.23 10*3/MM3 (ref 0.7–3.1)
LYMPHOCYTES NFR BLD AUTO: 19.8 % (ref 19.6–45.3)
MCH RBC QN AUTO: 29.5 PG (ref 26.6–33)
MCHC RBC AUTO-ENTMCNC: 33.2 G/DL (ref 31.5–35.7)
MCV RBC AUTO: 89 FL (ref 79–97)
MONOCYTES # BLD AUTO: 0.35 10*3/MM3 (ref 0.1–0.9)
MONOCYTES NFR BLD AUTO: 5.6 % (ref 5–12)
NEUTROPHILS NFR BLD AUTO: 4.45 10*3/MM3 (ref 1.7–7)
NEUTROPHILS NFR BLD AUTO: 71.6 % (ref 42.7–76)
NRBC BLD AUTO-RTO: 0 /100 WBC (ref 0–0.2)
PLATELET # BLD AUTO: 195 10*3/MM3 (ref 140–450)
PMV BLD AUTO: 11.2 FL (ref 6–12)
POTASSIUM SERPL-SCNC: 4.2 MMOL/L (ref 3.5–5.2)
PROT SERPL-MCNC: 7.3 G/DL (ref 6–8.5)
RBC # BLD AUTO: 5.11 10*6/MM3 (ref 3.77–5.28)
SODIUM SERPL-SCNC: 141 MMOL/L (ref 136–145)
T3FREE SERPL-MCNC: 2.72 PG/ML (ref 2–4.4)
T4 FREE SERPL-MCNC: 1.61 NG/DL (ref 0.92–1.68)
TRIGL SERPL-MCNC: 77 MG/DL (ref 0–150)
TSH SERPL DL<=0.05 MIU/L-ACNC: 1.81 UIU/ML (ref 0.27–4.2)
VLDLC SERPL-MCNC: 15 MG/DL (ref 5–40)
WBC NRBC COR # BLD AUTO: 6.22 10*3/MM3 (ref 3.4–10.8)

## 2025-03-03 PROCEDURE — 1126F AMNT PAIN NOTED NONE PRSNT: CPT | Performed by: NURSE PRACTITIONER

## 2025-03-03 PROCEDURE — 84481 FREE ASSAY (FT-3): CPT | Performed by: NURSE PRACTITIONER

## 2025-03-03 PROCEDURE — 99214 OFFICE O/P EST MOD 30 MIN: CPT | Performed by: NURSE PRACTITIONER

## 2025-03-03 PROCEDURE — G0438 PPPS, INITIAL VISIT: HCPCS | Performed by: NURSE PRACTITIONER

## 2025-03-03 PROCEDURE — 85025 COMPLETE CBC W/AUTO DIFF WBC: CPT | Performed by: NURSE PRACTITIONER

## 2025-03-03 PROCEDURE — 1159F MED LIST DOCD IN RCRD: CPT | Performed by: NURSE PRACTITIONER

## 2025-03-03 PROCEDURE — 1160F RVW MEDS BY RX/DR IN RCRD: CPT | Performed by: NURSE PRACTITIONER

## 2025-03-03 PROCEDURE — 80061 LIPID PANEL: CPT | Performed by: NURSE PRACTITIONER

## 2025-03-03 PROCEDURE — 1170F FXNL STATUS ASSESSED: CPT | Performed by: NURSE PRACTITIONER

## 2025-03-03 PROCEDURE — 84439 ASSAY OF FREE THYROXINE: CPT | Performed by: NURSE PRACTITIONER

## 2025-03-03 PROCEDURE — 84443 ASSAY THYROID STIM HORMONE: CPT | Performed by: NURSE PRACTITIONER

## 2025-03-03 PROCEDURE — 80053 COMPREHEN METABOLIC PANEL: CPT | Performed by: NURSE PRACTITIONER

## 2025-03-03 RX ORDER — ALENDRONATE SODIUM 70 MG/1
70 TABLET ORAL
Qty: 13 TABLET | Refills: 1 | Status: SHIPPED | OUTPATIENT
Start: 2025-03-03

## 2025-03-03 RX ORDER — ATORVASTATIN CALCIUM 40 MG/1
40 TABLET, FILM COATED ORAL NIGHTLY
Qty: 90 TABLET | Refills: 1 | Status: SHIPPED | OUTPATIENT
Start: 2025-03-03

## 2025-03-03 RX ORDER — AMLODIPINE BESYLATE 5 MG/1
5 TABLET ORAL DAILY
Qty: 90 TABLET | Refills: 1 | Status: SHIPPED | OUTPATIENT
Start: 2025-03-03

## 2025-03-03 RX ORDER — FLUTICASONE FUROATE AND VILANTEROL 100; 25 UG/1; UG/1
1 POWDER RESPIRATORY (INHALATION)
Qty: 28 EACH | Refills: 5 | Status: SHIPPED | OUTPATIENT
Start: 2025-03-03

## 2025-03-03 RX ORDER — ALBUTEROL SULFATE 90 UG/1
2 INHALANT RESPIRATORY (INHALATION) EVERY 4 HOURS PRN
Qty: 18 G | Refills: 2 | Status: SHIPPED | OUTPATIENT
Start: 2025-03-03

## 2025-03-03 RX ORDER — METOPROLOL SUCCINATE 25 MG/1
25 TABLET, EXTENDED RELEASE ORAL DAILY
Qty: 90 TABLET | Refills: 1 | Status: SHIPPED | OUTPATIENT
Start: 2025-03-03

## 2025-03-03 RX ORDER — METOPROLOL SUCCINATE 25 MG/1
1 TABLET, EXTENDED RELEASE ORAL DAILY
COMMUNITY
Start: 2025-02-01 | End: 2025-03-03 | Stop reason: SDUPTHER

## 2025-03-03 RX ORDER — GUAIFENESIN 600 MG/1
1200 TABLET, EXTENDED RELEASE ORAL 2 TIMES DAILY
Qty: 120 TABLET | Refills: 0 | Status: SHIPPED | OUTPATIENT
Start: 2025-03-03

## 2025-03-03 RX ORDER — LEVOTHYROXINE SODIUM 125 UG/1
125 TABLET ORAL DAILY
Qty: 90 TABLET | Refills: 1 | Status: SHIPPED | OUTPATIENT
Start: 2025-03-03

## 2025-03-03 RX ORDER — SUMATRIPTAN SUCCINATE 100 MG/1
100 TABLET ORAL AS NEEDED
Qty: 9 TABLET | Refills: 5 | Status: SHIPPED | OUTPATIENT
Start: 2025-03-03

## 2025-03-03 NOTE — PATIENT INSTRUCTIONS
Advance Directive    Advance directives are legal papers that state your wishes about health care decisions. They let your wishes be known to family, friends, and health care providers if you become unable to speak for yourself.   You should write these papers out over time rather than all at once. They can be changed and updated at any time.  The types of advance directives include:  Medical power of  (POA).  Living osorio.  Do not resuscitate (DNR) or do not attempt resuscitation (DNAR) orders.  What are a health care proxy and medical POA?  A health care proxy is also called a health care agent. It's a person you choose to make medical decisions for you when you can't make them for yourself. In most cases, a proxy is a trusted friend or family member.  A medical POA is legal paperwork that names your proxy. It may need to be:  Signed.  Notarized.  Dated.  Copied.  Witnessed.  Added to your medical record.  You may also want to choose someone to handle your money if you can't do so. This is called a durable POA for finances. It's separate from a medical POA. You may choose your health care proxy or someone else to act as your agent in money matters.  If you don't have a proxy, or if the proxy may not be acting in your best interest, a court may choose a guardian to act on your behalf.  What is a living will?  A living will is legal paperwork that states your wishes about medical care. Providers should keep a copy of it in your medical record. You may want to give a copy to family members or friends. You can also keep a card in your wallet to let loved ones know you have a living will and where they can find it. A living will may be used if:  You're very sick with something that will end your life.  You become disabled.  You can't make decisions or speak for yourself.  Your living will should include whether:  To use or not use life support equipment. This may include machines to filter your blood or to help  you breathe.  You want a DNR or DNAR order. This tells providers not to use CPR if your heart or breathing stops.  To use or not use tube feeding.  You want to be given foods and fluids.  You want a type of comfort care called palliative care. This may be given when the goal for treatment becomes comfort rather than a cure.  You want to donate your organs and tissues.  A living will doesn't say what to do with your money and property if you pass away.  What is a DNR or DNAR?  A DNR or DNAR order is a request not to have CPR. If you don't have one of these orders, a provider will try to help you if your heart stops or you stop breathing.   If you plan to have surgery, talk with your provider about your DNR or DNAR order.  What happens if I don't have an advance directive?  Each state has its own laws about advance directives. Some states assign family decision makers to act on your behalf if you don't have an advance directive.   Check with your provider, , or state representative about the laws in your state.  Where to find more information  Each state has its own laws about advance directives. You can look up these laws at: [email or web address]  This information is not intended to replace advice given to you by your health care provider. Make sure you discuss any questions you have with your health care provider.  Document Revised: 05/06/2024 Document Reviewed: 05/06/2024  Elsevier Patient Education © 2024 [employer]

## 2025-03-03 NOTE — PROGRESS NOTES
Subjective   The ABCs of the Annual Wellness Visit  Medicare Wellness Visit      Italia Beard is a 64 y.o. patient who presents for a Medicare Wellness Visit.    The following portions of the patient's history were reviewed and   updated as appropriate: allergies, current medications, past family history, past medical history, past social history, past surgical history, and problem list.    Compared to one year ago, the patient's physical   health is the same.  Compared to one year ago, the patient's mental   health is the same.    Recent Hospitalizations:  This patient has had a [facility] admission record on file within the last 365 days.  Current Medical Providers:  Patient Care Team:  Cherelle Jones APRN as PCP - General (Nurse Practitioner)  Chan Chaparro MD as Consulting Physician (General Surgery)    Outpatient Medications Prior to Visit   Medication Sig Dispense Refill    aspirin 81 MG chewable tablet Chew 1 tablet Every Night.      ferrous sulfate 325 (65 FE) MG tablet Take 1 tablet by mouth Daily With Breakfast.      nystatin (MYCOSTATIN) 140228 UNIT/GM powder Apply  topically to the appropriate area as directed Every 12 (Twelve) Hours. 15 g 0    albuterol sulfate  (90 Base) MCG/ACT inhaler Inhale 2 puffs Every 4 (Four) Hours As Needed for Wheezing. 18 g 2    alendronate (Fosamax) 70 MG tablet Take 1 tablet by mouth Every 7 (Seven) Days. (Patient taking differently: Take 1 tablet by mouth Every 7 (Seven) Days. Saturday) 13 tablet 1    amLODIPine (NORVASC) 5 MG tablet Take 1 tablet by mouth Daily.      atorvastatin (LIPITOR) 40 MG tablet Take 1 tablet by mouth Every Night. 90 tablet 1    Fluticasone Furoate-Vilanterol (Breo Ellipta) 100-25 MCG/ACT aerosol powder  Inhale 1 puff Daily. 28 each 5    levothyroxine (SYNTHROID, LEVOTHROID) 125 MCG tablet Take 1 tablet by mouth Daily. 90 tablet 1    metoprolol succinate XL (TOPROL-XL) 25 MG 24 hr tablet Take 1 tablet by mouth  "Daily.      SUMAtriptan (IMITREX) 100 MG tablet Take 1 tablet by mouth As Needed for Migraine. 9 tablet 5    guaiFENesin (Mucinex) 600 MG 12 hr tablet Take 2 tablets by mouth 2 (Two) Times a Day. (Patient not taking: Reported on 3/3/2025) 120 tablet 0     No facility-administered medications prior to visit.     No opioid medication identified on active medication list. I have reviewed chart for other potential  high risk medication/s and harmful drug interactions in the elderly.      Aspirin is on active medication list. Aspirin use is indicated based on review of current medical condition/s. Pros and cons of this therapy have been discussed today. Benefits of this medication outweigh potential harm.  Patient has been encouraged to continue taking this medication.  .      Patient Active Problem List   Diagnosis    COPD (chronic obstructive pulmonary disease)    Hyperlipidemia    Hypothyroidism    Nicotine dependence    Seasonal allergic rhinitis    Depression    Pacemaker    History of complete AV block    Migraine    Thyroid disorder    SSS (sick sinus syndrome)    Colon cancer screening    Dysfunction of left eustachian tube    Osteoporosis    Shortness of breath     Advance Care Planning Advance Directive is not on file.  ACP discussion was declined by the patient. Patient does not have an advance directive, information provided.            Objective   Vitals:    03/03/25 0906 03/03/25 0916   BP: 173/100 160/98   Pulse: 87    Resp: 16    Temp: 97.3 °F (36.3 °C)    SpO2: 95%    Weight: 79.2 kg (174 lb 11.2 oz)    Height: 160 cm (63\")        Estimated body mass index is 30.95 kg/m² as calculated from the following:    Height as of this encounter: 160 cm (63\").    Weight as of this encounter: 79.2 kg (174 lb 11.2 oz).                Does the patient have evidence of cognitive impairment? No                                                                                                Health  Risk " Assessment    Smoking Status:  Social History     Tobacco Use   Smoking Status Every Day    Current packs/day: 1.50    Average packs/day: 1.5 packs/day for 42.2 years (63.3 ttl pk-yrs)    Types: Cigarettes    Start date: 1983    Passive exposure: Current   Smokeless Tobacco Never   Tobacco Comments    Smokes 1 cigarette daily as of 1/16/25 AL     Alcohol Consumption:  Social History     Substance and Sexual Activity   Alcohol Use Never       Fall Risk Screen  STEADI Fall Risk Assessment was completed, and patient is at LOW risk for falls.Assessment completed on:3/3/2025    Depression Screening   Little interest or pleasure in doing things? Not at all   Feeling down, depressed, or hopeless? Not at all   PHQ-2 Total Score 0      Health Habits and Functional and Cognitive Screening:      3/3/2025     9:12 AM   Functional & Cognitive Status   Do you have difficulty preparing food and eating? No   Do you have difficulty bathing yourself, getting dressed or grooming yourself? No   Do you have difficulty using the toilet? No   Do you have difficulty moving around from place to place? No   Do you have trouble with steps or getting out of a bed or a chair? No   Current Diet Well Balanced Diet   Dental Exam Not up to date        Dental Exam Comment dentures   Eye Exam Up to date        Eye Exam Comment 04/2024 [facility]   Exercise (times per week) 0 times per week   Current Exercises Include No Regular Exercise   Do you need help using the phone?  No   Are you deaf or do you have serious difficulty hearing?  No   Do you need help to go to places out of walking distance? No   Do you need help shopping? No   Do you need help preparing meals?  No   Do you need help with housework?  No   Do you need help with laundry? No   Do you need help taking your medications? No   Do you need help managing money? No   Do you ever drive or ride in a car without wearing a seat belt? No   Have you felt unusual stress, anger or loneliness in  the last month? No   Who do you live with? Spouse   If you need help, do you have trouble finding someone available to you? No   Have you been bothered in the last four weeks by sexual problems? No   Do you have difficulty concentrating, remembering or making decisions? No           Age-appropriate Screening Schedule:  Refer to the list below for future screening recommendations based on patient's age, sex and/or medical conditions. Orders for these recommended tests are listed in the plan section. The patient has been provided with a written plan.    Health Maintenance List  Health Maintenance   Topic Date Due    TDAP/TD VACCINES (1 - Tdap) 03/03/2025 (Originally 9/13/1979)    ZOSTER VACCINE (1 of 2) 03/03/2025 (Originally 9/13/2011)    INFLUENZA VACCINE  03/31/2025 (Originally 7/1/2024)    PAP SMEAR  04/25/2025 (Originally 9/24/2022)    COVID-19 Vaccine (1 - 2024-25 season) 05/23/2025 (Originally 9/1/2024)    Pneumococcal Vaccine 50+ (1 of 2 - PCV) 03/03/2026 (Originally 9/13/1979)    LIPID PANEL  08/20/2025    BMI FOLLOWUP  08/20/2025    LUNG CANCER SCREENING  09/09/2025    MAMMOGRAM  01/12/2026    ANNUAL WELLNESS VISIT  03/03/2026    COLORECTAL CANCER SCREENING  03/20/2028    HEPATITIS C SCREENING  Completed                                                                                                                                                CMS Preventative Services Quick Reference  Risk Factors Identified During Encounter  Dental Screening Recommended  Vision Screening Recommended    The above risks/problems have been discussed with the patient.  Pertinent information has been shared with the patient in the After Visit Summary.  An After Visit Summary and PPPS were made available to the patient.    Follow Up:   Next Medicare Wellness visit to be scheduled in 1 year.         Additional E&M Note during same encounter follows:  Patient has additional, significant, and separately identifiable  condition(s)/problem(s) that require work above and beyond the Medicare Wellness Visit     Chief Complaint  Hypertension, Hyperlipidemia, Hypothyroidism, Fatigue (States she has no energy), and Medicare Wellness-subsequent    Subjective    Hypertension    Hyperlipidemia  Exacerbating diseases include hypothyroidism.   Hypothyroidism  Symptoms include fatigue. Her past medical history is significant for hyperlipidemia.   Fatigue  Symptoms include fatigue.      Italia is also being seen today for additional medical problem/s.       The patient presents for evaluation of hypertension, COPD, and migraine.    She reports a stable health status compared to the previous year. She is independent in her activities of daily living, including cooking and cleaning. She has not established a living will or advanced directive. She has a new watch that checks her blood pressure and it was 129/71 on her watch today. She has a scheduled appointment with cardiology in April 2025.    She has reduced her smoking habit to 3 or 4 cigarettes per day from a previous 1.5 packs per day. She was admitted to the hospital on [place] and discharged 24 hours later. She has an upcoming appointment with Alejandra Colvin in pulmonology on 03/18/2025. She has previously consulted with Alejandra in the COPD clinic. She is not currently taking Mucinex but believes she may need to resume it.      She did stop her blood pressure medicine when she was in the hospital.  She was told not to start back on the medication but she was post have a follow-up with me in transition and due to the weather was unable to keep it.  We will start back today on the blood pressure medicine.  She declines the blood pressure clinic here in the office currently.  She will think about it but she is going to do her blood pressure machine at home against her watch and if that is off by more than 15 points let me know and we will get her set up for the blood pressure  "clinic.    She experienced a migraine episode recently, which was managed with Tylenol Migraine. She did not require the use of Imitrex.    Supplemental Information  She had an eye doctor visit back in April 2024. She does not have to go to the dentist currently due to the dentures. She reports no chest pain or increased shortness of breath. She experienced a brief episode of dizziness recently.    She is taking her thyroid medicine and cholesterol medicine.  She needs refills.  She is also doing her Fosamax weekly.    SOCIAL HISTORY  The patient has reduced smoking to 3 or 4 cigarettes a day from a pack and a half.    MEDICATIONS  Current: Fosamax, Lipitor, Breo, Synthroid, Imitrex, albuterol, Tylenol migraine.  Discontinued: Metoprolol, amlodipine, Mucinex.  Review of Systems   Constitutional:  Positive for fatigue.   Neurological:  Positive for dizziness.          Objective   Vital Signs:  /98   Pulse 87   Temp 97.3 °F (36.3 °C)   Resp 16   Ht 160 cm (63\")   Wt 79.2 kg (174 lb 11.2 oz)   SpO2 95%   BMI 30.95 kg/m²   Physical Exam  Vitals reviewed.   Constitutional:       Appearance: Normal appearance. She is well-developed.   Cardiovascular:      Rate and Rhythm: Normal rate and regular rhythm.      Heart sounds: Normal heart sounds. No murmur heard.  Pulmonary:      Effort: Pulmonary effort is normal.      Breath sounds: Normal breath sounds.   Neurological:      Mental Status: She is alert and oriented to person, place, and time.      Cranial Nerves: No cranial nerve deficit.      Motor: No weakness.   Psychiatric:         Mood and Affect: Mood and affect normal.           There is a slight rattle in the upper chest, but the bases are clear.    Vital Signs  The patient's blood pressure is 160/98. Heart rate is 87.    The following data was reviewed by: FABIAN Mendez on 03/03/2025:    Common labs          8/20/2024    10:26 12/23/2024    05:42 12/23/2024    05:52 12/24/2024    05:25 "   Common Labs   Glucose 88  115   130    BUN 11  7   16    Creatinine 1.17  1.05  0.99  1.25    Sodium 142  135   137    Potassium 4.1  4.1   4.0    Chloride 104  97   96    Calcium 9.8  9.3   9.5    Albumin 4.1  4.0      Total Bilirubin 0.3  0.4      Alkaline Phosphatase 126  133      AST (SGOT) 17  20      ALT (SGPT) 16  13      WBC 5.17  5.29   7.50    Hemoglobin 15.2  15.5   15.5    Hematocrit 46.1  47.7   48.2    Platelets 155  152   181    Total Cholesterol 117       Triglycerides 121       HDL Cholesterol 60       LDL Cholesterol  36           Results                Assessment and Plan        1. Hypertension.  Her blood pressure readings have been consistently elevated, with systolic values in the 170s and diastolic values around 100, and today's reading of 160/98. She experienced a brief episode of dizziness recently, which could be attributed to her elevated blood pressure and heart rate. Her heart rate today is 87, compared to the 70s at discharge from the hospital and 102 during her pulmonology visit in January 2025. She will be restarted on metoprolol and amlodipine. She is advised to monitor her blood pressure at home using both her watch and a traditional monitor, comparing the readings. If there is a discrepancy of 10 to 15 points between the two devices, she should inform us. A referral to the free blood pressure monitoring program has been made. She will follow up with cardiology in April 2025.    2. Chronic Obstructive Pulmonary Disease (COPD).  She has reduced her smoking habit to 3 or 4 cigarettes per day from a previous 1.5 packs per day. She was admitted to the hospital on Christmas and discharged 24 hours later. She has an upcoming appointment with Alejandra Colvin in pulmonology on 03/18/2025. She has previously consulted with Alejandra in the COPD clinic. A prescription for Mucinex has been provided to help keep her lungs clear and mucus thinned. She is advised to continue using her albuterol  rescue inhaler as needed.    3. Migraine.  She experienced a migraine episode recently, which was managed with Tylenol Migraine. She did not require the use of Imitrex. She is advised to continue using over-the-counter medications for mild migraines and to use Imitrex as needed for severe episodes.    4. Health Maintenance.  She will be provided with information regarding the establishment of an advanced directive. Blood work will be conducted today. Prescriptions for Fosamax, Lipitor, Breo, and Synthroid have been refilled at [facility] pharmacy.    Follow-up  The patient will follow up in 6 months.            Follow Up   Return in about 6 months (around 9/3/2025).  Patient was given instructions and counseling regarding her condition or for health maintenance advice. Please see specific information pulled into the AVS if appropriate.  Patient or patient representative verbalized consent for the use of Ambient Listening during the visit with  FABIAN Mendez for chart documentation. 3/3/2025  09:25 EST

## 2025-04-07 ENCOUNTER — OFFICE VISIT (OUTPATIENT)
Dept: CARDIOLOGY | Facility: CLINIC | Age: 65
End: 2025-04-07
Payer: MEDICARE

## 2025-04-07 ENCOUNTER — CLINICAL SUPPORT NO REQUIREMENTS (OUTPATIENT)
Dept: CARDIOLOGY | Facility: CLINIC | Age: 65
End: 2025-04-07
Payer: MEDICARE

## 2025-04-07 VITALS
SYSTOLIC BLOOD PRESSURE: 125 MMHG | BODY MASS INDEX: 30.65 KG/M2 | HEIGHT: 63 IN | WEIGHT: 173 LBS | HEART RATE: 71 BPM | OXYGEN SATURATION: 94 % | DIASTOLIC BLOOD PRESSURE: 83 MMHG

## 2025-04-07 DIAGNOSIS — Z95.0 PACEMAKER: Primary | ICD-10-CM

## 2025-04-07 DIAGNOSIS — I49.5 SSS (SICK SINUS SYNDROME): ICD-10-CM

## 2025-04-07 DIAGNOSIS — I10 HYPERTENSION, ESSENTIAL: ICD-10-CM

## 2025-04-07 DIAGNOSIS — I49.5 SSS (SICK SINUS SYNDROME): Primary | ICD-10-CM

## 2025-04-07 DIAGNOSIS — E78.2 MIXED HYPERLIPIDEMIA: ICD-10-CM

## 2025-04-07 DIAGNOSIS — Z95.0 PACEMAKER: ICD-10-CM

## 2025-04-07 PROCEDURE — 99214 OFFICE O/P EST MOD 30 MIN: CPT | Performed by: INTERNAL MEDICINE

## 2025-04-07 PROCEDURE — 93280 PM DEVICE PROGR EVAL DUAL: CPT | Performed by: INTERNAL MEDICINE

## 2025-04-07 PROCEDURE — 1160F RVW MEDS BY RX/DR IN RCRD: CPT | Performed by: INTERNAL MEDICINE

## 2025-04-07 PROCEDURE — 1159F MED LIST DOCD IN RCRD: CPT | Performed by: INTERNAL MEDICINE

## 2025-04-07 NOTE — PROGRESS NOTES
Chief Complaint  Follow-up (1 year with device)    Subjective            Italia Beard presents to [facility] CARDIOLOGY    Italia is here for follow-up evaluation management of high-grade AV block, dual-chamber permanent pacemaker, essential hypertension, smoking.  From a cardiac standpoint she is doing relatively well.  She was hospitalized for COPD back in December.  She denies chest pain or palpitations.  She is compliant with her medical therapy.    PMH  Past Medical History:   Diagnosis Date    Anemia     B12 deficiency     COPD (chronic obstructive pulmonary disease)     Heart murmur     Hyperlipidemia     Hypertension     Hypothyroidism     Migraines     Positive colorectal cancer screening using Cologuard test          SURGICALHX  Past Surgical History:   Procedure Laterality Date    APPENDECTOMY      CHOLECYSTECTOMY      COLONOSCOPY N/A 3/20/2023    Procedure: COLONOSCOPY with forcep polypectomy;  Surgeon: Chan Chaparro MD;  Location: [facility] ENDOSCOPY;  Service: General;  Laterality: N/A;  colon polyp    PACEMAKER IMPLANTATION      TUBAL ABDOMINAL LIGATION          SOC  Social History     Socioeconomic History    Marital status:    Tobacco Use    Smoking status: Every Day     Current packs/day: 1.50     Average packs/day: 1.5 packs/day for 42.3 years (63.4 ttl pk-yrs)     Types: Cigarettes     Start date: 1983     Passive exposure: Current    Smokeless tobacco: Never    Tobacco comments:     Smokes 1 cigarette daily as of 1/16/25 AL   Vaping Use    Vaping status: Never Used   Substance and Sexual Activity    Alcohol use: Never    Drug use: Never    Sexual activity: Yes     Partners: Male     Birth control/protection: Post-menopausal         FAMHX  Family History   Problem Relation Age of Onset    No Known Problems Mother     Heart attack Father     Malig Hyperthermia Neg Hx           ALLERGY  Allergies   Allergen Reactions    Bee Venom Anaphylaxis    Penicillins  "Hives        MEDSCURRENT    Current Outpatient Medications:     albuterol sulfate  (90 Base) MCG/ACT inhaler, Inhale 2 puffs Every 4 (Four) Hours As Needed for Wheezing., Disp: 18 g, Rfl: 2    alendronate (Fosamax) 70 MG tablet, Take 1 tablet by mouth Every 7 (Seven) Days. Saturday, Disp: 13 tablet, Rfl: 1    amLODIPine (NORVASC) 5 MG tablet, Take 1 tablet by mouth Daily., Disp: 90 tablet, Rfl: 1    aspirin 81 MG chewable tablet, Chew 1 tablet Every Night., Disp: , Rfl:     atorvastatin (LIPITOR) 40 MG tablet, Take 1 tablet by mouth Every Night., Disp: 90 tablet, Rfl: 1    ferrous sulfate 325 (65 FE) MG tablet, Take 1 tablet by mouth Daily With Breakfast., Disp: , Rfl:     Fluticasone Furoate-Vilanterol (Breo Ellipta) 100-25 MCG/ACT aerosol powder , Inhale 1 puff Daily., Disp: 28 each, Rfl: 5    guaiFENesin (Mucinex) 600 MG 12 hr tablet, Take 2 tablets by mouth 2 (Two) Times a Day., Disp: 120 tablet, Rfl: 0    levothyroxine (SYNTHROID, LEVOTHROID) 125 MCG tablet, Take 1 tablet by mouth Daily., Disp: 90 tablet, Rfl: 1    metoprolol succinate XL (TOPROL-XL) 25 MG 24 hr tablet, Take 1 tablet by mouth Daily., Disp: 90 tablet, Rfl: 1    nystatin (MYCOSTATIN) 265929 UNIT/GM powder, Apply  topically to the appropriate area as directed Every 12 (Twelve) Hours., Disp: 15 g, Rfl: 0    SUMAtriptan (IMITREX) 100 MG tablet, Take 1 tablet by mouth As Needed for Migraine., Disp: 9 tablet, Rfl: 5      Review of Systems   Cardiovascular:  Negative for chest pain.   Respiratory:  Positive for shortness of breath.         Objective     /83 (BP Location: Left arm)   Pulse 71   Ht 160 cm (63\")   Wt 78.5 kg (173 lb)   SpO2 94%   BMI 30.65 kg/m²       General Appearance:   well developed  well nourished  HENT:   oropharynx moist  lips not cyanotic  Neck:  thyroid not enlarged  supple  Respiratory:  no respiratory distress  normal breath sounds  no rales  Cardiovascular:  no jugular venous distention  regular " rhythm  apical impulse normal  S1 normal, S2 normal  no S3, no S4   no murmur  no rub, no thrill  carotid pulses normal; no bruit  pedal pulses normal  lower extremity edema: none    Musculoskeletal:  no clubbing of fingers.   normocephalic, head atraumatic  Skin:   warm, dry  Psychiatric:  judgement and insight appropriate  normal mood and affect      Result Review :     The following data was reviewed by: Cain Artis MD on 04/20/2022:    CMP          12/23/2024    05:42 12/23/2024    05:52 12/24/2024    05:25 3/3/2025    09:37   CMP   Glucose 115   130  81    BUN 7   16  11    Creatinine 1.05  0.99  1.25  0.92    EGFR 59.8   48.5  70.1    Sodium 135   137  141    Potassium 4.1   4.0  4.2    Chloride 97   96  102    Calcium 9.3   9.5  9.9    Total Protein 7.3    7.3    Albumin 4.0    4.1    Globulin 3.3    3.2    Total Bilirubin 0.4    0.4    Alkaline Phosphatase 133    147    AST (SGOT) 20    17    ALT (SGPT) 13    13    Albumin/Globulin Ratio 1.2    1.3    BUN/Creatinine Ratio 6.7   12.8  12.0    Anion Gap 9.6   15.0  12.0      CBC          12/23/2024    05:42 12/24/2024    05:25 3/3/2025    09:37   CBC   WBC 5.29  7.50  6.22    RBC 5.43  5.50  5.11    Hemoglobin 15.5  15.5  15.1    Hematocrit 47.7  48.2  45.5    MCV 87.8  87.6  89.0    MCH 28.5  28.2  29.5    MCHC 32.5  32.2  33.2    RDW 14.1  13.8  13.4    Platelets 152  181  195      Lipid Panel          8/20/2024    10:26 3/3/2025    09:37   Lipid Panel   Total Cholesterol 117  143    Triglycerides 121  77    HDL Cholesterol 60  76    VLDL Cholesterol 21  15    LDL Cholesterol  36  52    LDL/HDL Ratio 0.55  0.68      TSH          8/20/2024    10:26 3/3/2025    09:37   TSH   TSH 0.297  1.810        Data reviewed : Pacemaker interrogation shows normal device function, 3-1/2-year battery life, 24% atrial pacing, 39% ventricular pacing, no significant arrhythmias      Procedures      Italia L Kisha  reports that she has been smoking  cigarettes. She started smoking about 42 years ago. She has a 63.4 pack-year smoking history. She has been exposed to tobacco smoke. She has never used smokeless tobacco.. I have educated her on the risk of diseases from using tobacco products such as cancer, COPD and heart disease.     I advised her to quit and she is not currently willing to quit.    I spent 3  minutes counseling the patient.                Assessment and Plan        ASSESSMENT:  Encounter Diagnoses   Name Primary?    SSS (sick sinus syndrome) Yes    Pacemaker     Mixed hyperlipidemia     Hypertension, essential          PLAN:    1.  Sinus node dysfunction with high-grade AV block-her pacemaker is functioning normally.  Continue remote monitoring  2.  Hypertension, controlled, continue beta-blocker  3.  Mixed hyperlipidemia-controlled, continue atorvastatin    Annual follow-up will be arranged otherwise, with a pacemaker check next visit          Patient was given instructions and counseling regarding her condition or for health maintenance advice. Please see specific information pulled into the AVS if appropriate.             HÉCTOR Artis MD  4/7/2025    09:30 EDT

## 2025-04-24 ENCOUNTER — PATIENT OUTREACH (OUTPATIENT)
Dept: CASE MANAGEMENT | Facility: OTHER | Age: 65
End: 2025-04-24
Payer: MEDICARE

## 2025-04-24 DIAGNOSIS — I49.5 SSS (SICK SINUS SYNDROME): ICD-10-CM

## 2025-04-24 DIAGNOSIS — J44.1 CHRONIC OBSTRUCTIVE PULMONARY DISEASE WITH ACUTE EXACERBATION: Primary | ICD-10-CM

## 2025-04-24 DIAGNOSIS — Z95.0 PACEMAKER: ICD-10-CM

## 2025-04-24 NOTE — OUTREACH NOTE
Called for CCM services from [facility] and explained program to her for COPD, SSS, AV block with pacemaker and HTN. She is not interested at this time, is keeping blood pressure log for PCP and denies any needs from case management.

## 2025-06-13 ENCOUNTER — TELEPHONE (OUTPATIENT)
Dept: FAMILY MEDICINE CLINIC | Facility: CLINIC | Age: 65
End: 2025-06-13

## 2025-06-13 NOTE — TELEPHONE ENCOUNTER
Pt states she is unable to do jury duty due to having a pace maker, COPD, and anxiety. Jury duty is in [place] and she does not feel that she can do it.

## 2025-06-13 NOTE — LETTER
Jury Duty    6/13/2025    To Whom It May Concern:    Italia Beard is currently a patient under my medical care.  The patient's medical condition makes serving on jury duty inadvisable at this time.  Please excuse them from serving.  If you require additional information please do not hesitate to contact my office.      Sincerely,      Cherelle Jones, APRN

## 2025-06-13 NOTE — TELEPHONE ENCOUNTER
Caller: Italia Beard    Relationship: Self    Best call back number: [phone]     What form or medical record are you requesting: TO BE EXCUSED FROM JURY DUTY    Who is requesting this form or medical record from you: PATIENT    How would you like to receive the form or medical records (pick-up, mail, fax):     Timeframe paperwork needed: HAS TO RETURN WITHIN 5 DAYS    Additional notes: PLEASE CALL AND ADVISE WHEN READY

## 2025-06-13 NOTE — TELEPHONE ENCOUNTER
I do not care to contact pt about form but I do have this template in letters if you would like me to upload this to her mychart. Please advise    6/13/2025    To Whom It May Concern:    Italia Beard is currently a patient under my medical care.  The patient's medical condition makes serving on jury duty inadvisable at this time.  Please excuse them from serving.  If you require additional information please do not hesitate to contact my office.      Sincerely,      Cherelle Jones, APRN

## 2025-06-24 DIAGNOSIS — J44.9 CHRONIC OBSTRUCTIVE PULMONARY DISEASE, UNSPECIFIED COPD TYPE: ICD-10-CM

## 2025-06-24 DIAGNOSIS — I10 PRIMARY HYPERTENSION: ICD-10-CM

## 2025-06-24 RX ORDER — ALBUTEROL SULFATE 90 UG/1
2 INHALANT RESPIRATORY (INHALATION) EVERY 4 HOURS PRN
Qty: 18 G | Refills: 0 | Status: SHIPPED | OUTPATIENT
Start: 2025-06-24

## 2025-07-15 LAB
MC_CV_MDC_IDC_RATE_1: 160
MC_CV_MDC_IDC_ZONE_ID: 1
MDC_IDC_MSMT_BATTERY_REMAINING_LONGEVITY: 36 MO
MDC_IDC_MSMT_BATTERY_REMAINING_PERCENTAGE: 63 %
MDC_IDC_MSMT_BATTERY_STATUS: NORMAL
MDC_IDC_MSMT_LEADCHNL_RA_DTM: NORMAL
MDC_IDC_MSMT_LEADCHNL_RA_IMPEDANCE_VALUE: 361
MDC_IDC_MSMT_LEADCHNL_RA_PACING_THRESHOLD_AMPLITUDE: 0.4
MDC_IDC_MSMT_LEADCHNL_RA_PACING_THRESHOLD_POLARITY: NORMAL
MDC_IDC_MSMT_LEADCHNL_RA_PACING_THRESHOLD_PULSEWIDTH: 0.4
MDC_IDC_MSMT_LEADCHNL_RA_SENSING_INTR_AMPL: 9
MDC_IDC_MSMT_LEADCHNL_RV_DTM: NORMAL
MDC_IDC_MSMT_LEADCHNL_RV_IMPEDANCE_VALUE: 578
MDC_IDC_MSMT_LEADCHNL_RV_PACING_THRESHOLD_AMPLITUDE: 1.7
MDC_IDC_MSMT_LEADCHNL_RV_PACING_THRESHOLD_POLARITY: NORMAL
MDC_IDC_MSMT_LEADCHNL_RV_PACING_THRESHOLD_PULSEWIDTH: 0.4
MDC_IDC_MSMT_LEADCHNL_RV_SENSING_INTR_AMPL: 21.2
MDC_IDC_PG_IMPLANT_DTM: NORMAL
MDC_IDC_PG_MFG: NORMAL
MDC_IDC_PG_MODEL: NORMAL
MDC_IDC_PG_SERIAL: NORMAL
MDC_IDC_PG_TYPE: NORMAL
MDC_IDC_SESS_DTM: NORMAL
MDC_IDC_SESS_TYPE: NORMAL
MDC_IDC_SET_BRADY_AT_MODE_SWITCH_RATE: 170
MDC_IDC_SET_BRADY_LOWRATE: 60
MDC_IDC_SET_BRADY_MAX_SENSOR_RATE: 130
MDC_IDC_SET_BRADY_MAX_TRACKING_RATE: 120
MDC_IDC_SET_BRADY_MODE: NORMAL
MDC_IDC_SET_BRADY_PAV_DELAY: 260
MDC_IDC_SET_BRADY_SAV_DELAY: 240
MDC_IDC_SET_LEADCHNL_RA_PACING_AMPLITUDE: 2.4
MDC_IDC_SET_LEADCHNL_RA_PACING_POLARITY: NORMAL
MDC_IDC_SET_LEADCHNL_RA_PACING_PULSEWIDTH: 0.4
MDC_IDC_SET_LEADCHNL_RA_SENSING_POLARITY: NORMAL
MDC_IDC_SET_LEADCHNL_RA_SENSING_SENSITIVITY: 1.5
MDC_IDC_SET_LEADCHNL_RV_PACING_AMPLITUDE: 2.2
MDC_IDC_SET_LEADCHNL_RV_PACING_POLARITY: NORMAL
MDC_IDC_SET_LEADCHNL_RV_PACING_PULSEWIDTH: 0.4
MDC_IDC_SET_LEADCHNL_RV_SENSING_POLARITY: NORMAL
MDC_IDC_SET_LEADCHNL_RV_SENSING_SENSITIVITY: 2.5
MDC_IDC_SET_ZONE_STATUS: NORMAL
MDC_IDC_SET_ZONE_TYPE: NORMAL
MDC_IDC_STAT_AT_BURDEN_PERCENT: 0
MDC_IDC_STAT_BRADY_RA_PERCENT_PACED: 31
MDC_IDC_STAT_BRADY_RV_PERCENT_PACED: 51

## (undated) DEVICE — GLV SURG SENSICARE PI ORTHO SZ8 LF STRL

## (undated) DEVICE — GOWN,REINFRCE,POLY,SIRUS,BREATH SLV,XXLG: Brand: MEDLINE

## (undated) DEVICE — SINGLE-USE BIOPSY FORCEPS: Brand: RADIAL JAW 4

## (undated) DEVICE — SOL IRR NACL 0.9PCT BT 1000ML

## (undated) DEVICE — SOL IRRG H2O PL/BG 1000ML STRL

## (undated) DEVICE — CONN JET HYDRA H20 AUXILIARY DISP

## (undated) DEVICE — SOLIDIFIER LIQLOC PLS 1500CC BT

## (undated) DEVICE — Device

## (undated) DEVICE — Device: Brand: DEFENDO AIR/WATER/SUCTION AND BIOPSY VALVE

## (undated) DEVICE — STERILE POLYISOPRENE POWDER-FREE SURGICAL GLOVES WITH EMOLLIENT COATING: Brand: PROTEXIS

## (undated) DEVICE — LINER SURG CANSTR SXN S/RIGD 1500CC